# Patient Record
Sex: FEMALE | Race: WHITE | Employment: FULL TIME | URBAN - METROPOLITAN AREA
[De-identification: names, ages, dates, MRNs, and addresses within clinical notes are randomized per-mention and may not be internally consistent; named-entity substitution may affect disease eponyms.]

---

## 2017-07-04 PROBLEM — D25.9 UTERINE FIBROIDS AFFECTING PREGNANCY IN FIRST TRIMESTER: Status: ACTIVE | Noted: 2017-07-04

## 2017-07-04 PROBLEM — O34.11 UTERINE FIBROIDS AFFECTING PREGNANCY IN FIRST TRIMESTER: Status: ACTIVE | Noted: 2017-07-04

## 2017-08-09 PROBLEM — O99.212 OBESITY AFFECTING PREGNANCY IN SECOND TRIMESTER: Status: ACTIVE | Noted: 2017-08-09

## 2017-09-18 PROBLEM — O34.29 PREGNANCY W/ HX OF UTERINE MYOMECTOMY: Status: ACTIVE | Noted: 2017-09-18

## 2017-09-19 PROBLEM — O34.12 UTERINE FIBROIDS AFFECTING PREGNANCY IN SECOND TRIMESTER: Status: ACTIVE | Noted: 2017-07-04

## 2017-09-19 PROBLEM — O24.414 INSULIN CONTROLLED GESTATIONAL DIABETES MELLITUS (GDM) IN SECOND TRIMESTER: Status: ACTIVE | Noted: 2017-09-19

## 2017-09-19 PROBLEM — O99.332 TOBACCO USE AFFECTING PREGNANCY IN SECOND TRIMESTER, ANTEPARTUM: Status: ACTIVE | Noted: 2017-09-19

## 2017-09-19 PROBLEM — O09.92 SUPERVISION OF HIGH RISK PREGNANCY IN SECOND TRIMESTER: Status: ACTIVE | Noted: 2017-09-19

## 2017-09-19 PROBLEM — O24.312 PREEXISTING DIABETES COMPLICATING PREGNANCY IN SECOND TRIMESTER, ANTEPARTUM: Status: ACTIVE | Noted: 2017-09-19

## 2017-10-02 ENCOUNTER — HOSPITAL ENCOUNTER (OUTPATIENT)
Dept: DIABETES SERVICES | Age: 32
Discharge: HOME | End: 2017-10-02
Payer: COMMERCIAL

## 2017-10-02 VITALS — HEIGHT: 63 IN | WEIGHT: 255 LBS | BODY MASS INDEX: 45.18 KG/M2

## 2017-10-02 PROCEDURE — G0109 DIAB MANAGE TRN IND/GROUP: HCPCS

## 2017-10-02 NOTE — PROGRESS NOTES
Gestational Diabetes Self-Management Support Plan    Services Provided: Pt received education on nutrition and meal planning during pregnancy. Emotional support for adjustment to diagnosis was provided. Care Plan/Recommendations:  Pt instructed to record blood sugar 4x/day and record all meals and snacks. Pt to bring information to appointments with [facility]. Notes for Follow Up:   1. Barriers to checking blood glucose and adherence to meal plan identified:    Pt works 3rd shift and struggles with consistent meals/snacks. 2. Barriers to psychological adjustment to diagnosis identified:    Acceptance, pt also has PCOS  3.  Patient needs to be seen by [facility] ASAP due to:    Next appointment 10/10/17    Katie Hurtado MS, RD, LD  [facility]   [phone]

## 2017-11-21 PROBLEM — O99.213 OBESITY AFFECTING PREGNANCY IN THIRD TRIMESTER: Status: ACTIVE | Noted: 2017-08-09

## 2017-11-21 PROBLEM — O09.93 SUPERVISION OF HIGH RISK PREGNANCY IN THIRD TRIMESTER: Status: ACTIVE | Noted: 2017-09-19

## 2017-11-21 PROBLEM — O99.333 TOBACCO SMOKING COMPLICATING PREGNANCY, THIRD TRIMESTER: Status: ACTIVE | Noted: 2017-09-19

## 2017-11-21 PROBLEM — O34.13 UTERINE FIBROIDS AFFECTING PREGNANCY, THIRD TRIMESTER: Status: ACTIVE | Noted: 2017-07-04

## 2017-11-21 PROBLEM — O24.113 PRE-EXISTING TYPE 2 DIABETES MELLITUS IN PREGNANCY IN THIRD TRIMESTER: Status: ACTIVE | Noted: 2017-09-19

## 2017-12-22 ENCOUNTER — HOSPITAL ENCOUNTER (INPATIENT)
Age: 32
LOS: 8 days | Discharge: HOME | End: 2017-12-30
Attending: OBSTETRICS & GYNECOLOGY | Admitting: OBSTETRICS & GYNECOLOGY
Payer: COMMERCIAL

## 2017-12-22 DIAGNOSIS — O24.113 PRE-EXISTING TYPE 2 DIABETES MELLITUS IN PREGNANCY IN THIRD TRIMESTER: ICD-10-CM

## 2017-12-22 PROBLEM — O13.9 PREGNANCY INDUCED HYPERTENSION, ANTEPARTUM: Status: ACTIVE | Noted: 2017-12-22

## 2017-12-22 PROBLEM — O14.00 MILD PRE-ECLAMPSIA: Status: ACTIVE | Noted: 2017-12-22

## 2017-12-22 PROBLEM — O14.00 MILD PRE-ECLAMPSIA: Status: RESOLVED | Noted: 2017-12-22 | Resolved: 2017-12-22

## 2017-12-22 LAB
ALBUMIN SERPL-MCNC: 2.6 G/DL (ref 3.5–5)
ALBUMIN/GLOB SERPL: 0.7 {RATIO} (ref 1.2–3.5)
ALP SERPL-CCNC: 172 U/L (ref 50–136)
ALT SERPL-CCNC: 17 U/L (ref 12–65)
ANION GAP SERPL CALC-SCNC: 11 MMOL/L (ref 7–16)
AST SERPL-CCNC: 14 U/L (ref 15–37)
BILIRUB DIRECT SERPL-MCNC: 0.1 MG/DL
BILIRUB SERPL-MCNC: 0.2 MG/DL (ref 0.2–1.1)
BUN SERPL-MCNC: 10 MG/DL (ref 6–23)
CALCIUM SERPL-MCNC: 9.4 MG/DL (ref 8.3–10.4)
CHLORIDE SERPL-SCNC: 102 MMOL/L (ref 98–107)
CO2 SERPL-SCNC: 26 MMOL/L (ref 21–32)
CREAT SERPL-MCNC: 0.53 MG/DL (ref 0.6–1)
CREAT UR-MCNC: 104.88 MG/DL
ERYTHROCYTE [DISTWIDTH] IN BLOOD BY AUTOMATED COUNT: 12.8 % (ref 11.9–14.6)
GLOBULIN SER CALC-MCNC: 3.7 G/DL (ref 2.3–3.5)
GLUCOSE BLD STRIP.AUTO-MCNC: 133 MG/DL (ref 65–100)
GLUCOSE SERPL-MCNC: 88 MG/DL (ref 65–100)
HCT VFR BLD AUTO: 36.8 % (ref 35.8–46.3)
HGB BLD-MCNC: 12.6 G/DL (ref 11.7–15.4)
LDH SERPL L TO P-CCNC: 152 U/L (ref 100–190)
MCH RBC QN AUTO: 28.8 PG (ref 26.1–32.9)
MCHC RBC AUTO-ENTMCNC: 34.2 G/DL (ref 31.4–35)
MCV RBC AUTO: 84.2 FL (ref 79.6–97.8)
PLATELET # BLD AUTO: 269 K/UL (ref 150–450)
PMV BLD AUTO: 9.9 FL (ref 10.8–14.1)
POTASSIUM SERPL-SCNC: 4 MMOL/L (ref 3.5–5.1)
PROT SERPL-MCNC: 6.3 G/DL (ref 6.3–8.2)
PROT UR-MCNC: 28 MG/DL
PROT/CREAT UR-RTO: 0.3
RBC # BLD AUTO: 4.37 M/UL (ref 4.05–5.25)
SODIUM SERPL-SCNC: 139 MMOL/L (ref 136–145)
URATE SERPL-MCNC: 4.5 MG/DL (ref 2.6–6)
WBC # BLD AUTO: 11.5 K/UL (ref 4.3–11.1)

## 2017-12-22 PROCEDURE — 80076 HEPATIC FUNCTION PANEL: CPT | Performed by: OBSTETRICS & GYNECOLOGY

## 2017-12-22 PROCEDURE — 84156 ASSAY OF PROTEIN URINE: CPT | Performed by: OBSTETRICS & GYNECOLOGY

## 2017-12-22 PROCEDURE — 85027 COMPLETE CBC AUTOMATED: CPT | Performed by: OBSTETRICS & GYNECOLOGY

## 2017-12-22 PROCEDURE — 96372 THER/PROPH/DIAG INJ SC/IM: CPT

## 2017-12-22 PROCEDURE — 74011636637 HC RX REV CODE- 636/637: Performed by: OBSTETRICS & GYNECOLOGY

## 2017-12-22 PROCEDURE — 80048 BASIC METABOLIC PNL TOTAL CA: CPT | Performed by: OBSTETRICS & GYNECOLOGY

## 2017-12-22 PROCEDURE — 74011250636 HC RX REV CODE- 250/636: Performed by: OBSTETRICS & GYNECOLOGY

## 2017-12-22 PROCEDURE — 4A1HXCZ MONITORING OF PRODUCTS OF CONCEPTION, CARDIAC RATE, EXTERNAL APPROACH: ICD-10-PCS | Performed by: OBSTETRICS & GYNECOLOGY

## 2017-12-22 PROCEDURE — 74011250637 HC RX REV CODE- 250/637: Performed by: OBSTETRICS & GYNECOLOGY

## 2017-12-22 PROCEDURE — 83615 LACTATE (LD) (LDH) ENZYME: CPT | Performed by: OBSTETRICS & GYNECOLOGY

## 2017-12-22 PROCEDURE — 84550 ASSAY OF BLOOD/URIC ACID: CPT | Performed by: OBSTETRICS & GYNECOLOGY

## 2017-12-22 PROCEDURE — 65270000029 HC RM PRIVATE

## 2017-12-22 PROCEDURE — 82962 GLUCOSE BLOOD TEST: CPT

## 2017-12-22 PROCEDURE — 36415 COLL VENOUS BLD VENIPUNCTURE: CPT | Performed by: OBSTETRICS & GYNECOLOGY

## 2017-12-22 RX ORDER — BETAMETHASONE SODIUM PHOSPHATE AND BETAMETHASONE ACETATE 3; 3 MG/ML; MG/ML
12 INJECTION, SUSPENSION INTRA-ARTICULAR; INTRALESIONAL; INTRAMUSCULAR; SOFT TISSUE EVERY 24 HOURS
Status: DISCONTINUED | OUTPATIENT
Start: 2017-12-22 | End: 2017-12-22 | Stop reason: SDUPTHER

## 2017-12-22 RX ORDER — BETAMETHASONE SODIUM PHOSPHATE AND BETAMETHASONE ACETATE 3; 3 MG/ML; MG/ML
12 INJECTION, SUSPENSION INTRA-ARTICULAR; INTRALESIONAL; INTRAMUSCULAR; SOFT TISSUE EVERY 24 HOURS
Status: COMPLETED | OUTPATIENT
Start: 2017-12-22 | End: 2017-12-23

## 2017-12-22 RX ORDER — ZOLPIDEM TARTRATE 5 MG/1
5 TABLET ORAL
Status: DISCONTINUED | OUTPATIENT
Start: 2017-12-22 | End: 2017-12-28

## 2017-12-22 RX ORDER — INSULIN LISPRO 100 [IU]/ML
INJECTION, SOLUTION INTRAVENOUS; SUBCUTANEOUS EVERY 4 HOURS
Status: DISCONTINUED | OUTPATIENT
Start: 2017-12-22 | End: 2017-12-22

## 2017-12-22 RX ORDER — INSULIN LISPRO 100 [IU]/ML
INJECTION, SOLUTION INTRAVENOUS; SUBCUTANEOUS AS NEEDED
Status: DISCONTINUED | OUTPATIENT
Start: 2017-12-22 | End: 2017-12-28

## 2017-12-22 RX ORDER — INSULIN GLARGINE 100 [IU]/ML
16 INJECTION, SOLUTION SUBCUTANEOUS
Status: DISCONTINUED | OUTPATIENT
Start: 2017-12-22 | End: 2017-12-28

## 2017-12-22 RX ORDER — INSULIN LISPRO 100 [IU]/ML
26 INJECTION, SOLUTION INTRAVENOUS; SUBCUTANEOUS
Status: DISCONTINUED | OUTPATIENT
Start: 2017-12-22 | End: 2017-12-24

## 2017-12-22 RX ADMIN — ZOLPIDEM TARTRATE 5 MG: 5 TABLET ORAL at 22:17

## 2017-12-22 RX ADMIN — INSULIN GLARGINE 16 UNITS: 100 INJECTION, SOLUTION SUBCUTANEOUS at 20:30

## 2017-12-22 RX ADMIN — BETAMETHASONE SODIUM PHOSPHATE AND BETAMETHASONE ACETATE 12 MG: 3; 3 INJECTION, SUSPENSION INTRA-ARTICULAR; INTRALESIONAL; INTRAMUSCULAR at 13:37

## 2017-12-22 NOTE — PROGRESS NOTES
Dr Kat Barbosa called. Report given of VS and labs resulted. Orders received to admit for Mild Pre Eclampsia. Celestone ordered. May induce at 37 weeks unless Bp's rise further.

## 2017-12-22 NOTE — H&P
High Risk Obstetrics Admission H&P    Surendra Christensen  [id number]  1985    Chief Complaint:  New Mild PreE      HPI: 29 y.o. Lucian Francis at 36w3d sent from the office for HELLP labs and P:C ratio after having her 2nd mild range bp (148/90) this pregnancy. No hx CHTN.  1st mild bp noted at 35w3d-->high normotensive on repeat (132/88). Denies HA, SOB/CP, RUQ pain, scotomata. Gained 3# in 4 days. HELLP labs wnl, however P:C elevated at 0.3 and rules her in for Pre Eclampsia withOUT severe features. Has seen MFM regularly due to preexisting Type 2 DM. Last growth US (17) GP 22%, AC 17%, ED 18, Dopplers wnl. ROS:  A comprehensive review of systems was negative except for that written in the HPI. OB History      Para Term  AB Living    2 0 0 0 1 0    SAB TAB Ectopic Molar Multiple Live Births    1 0 0  0         Past Medical History:   Diagnosis Date    Asthma     prn inhaler    Cholelithiasis with cholecystitis 2015    GERD (gastroesophageal reflux disease)     controlled with medication    History of miscarriage 2014    Morbid obesity ([employer] [facility] [address]     bmi =51    Nausea & vomiting     with every surg--3-4 times-- then ok per pt    PCOS (polycystic ovarian syndrome)     Pregnancy induced hypertension, antepartum 2017    Psychiatric disorder     anxiety-- per pt-- no tx    PUD (peptic ulcer disease)     no problems since    Sleep apnea     ?--with  [facility]--- per pt never has been tested     Past Surgical History:   Procedure Laterality Date    HX CHOLECYSTECTOMY  2015    Dr Georgia Leyva      HX TONSIL AND ADENOIDECTOMY      HX WISDOM TEETH EXTRACTION       Social History     Social History    Marital status:      Spouse name: N/A    Number of children: N/A    Years of education: N/A     Occupational History    Not on file.      Social History Main Topics    Smoking status: Light Tobacco Smoker Packs/day: 1.00     Years: 9.00    Smokeless tobacco: Never Used      Comment: 1 cigarette daily    Alcohol use No    Drug use: No      Comment: last time used- 2015    Sexual activity: Yes     Partners: Male     Birth control/ protection: None      Comment: pregnant     Other Topics Concern    Not on file     Social History Narrative     Family History   Problem Relation Age of Onset    Hypertension Mother     Migraines Mother     Cancer Mother      cervical    Elevated Lipids Father     Breast Cancer Neg Hx     Colon Cancer Neg Hx     Ovarian Cancer Neg Hx      Allergies   Allergen Reactions    Latex Rash and Itching    Tree Nut Anaphylaxis    Codeine Nausea and Vomiting    Sulfa (Sulfonamide Antibiotics) Nausea and Vomiting    Naproxen Other (comments)     Can not take due to history peptic ulcers     Prior to Admission Medications   Prescriptions Last Dose Informant Patient Reported? Taking? Blood-Glucose Meter monitoring kit   No No   Sig: Pt to check blood sugars 4x daily. Cetirizine (ZYRTEC) 10 mg cap   Yes No   Sig: Take  by mouth. Insulin Needles, Disposable, (UNIFINE PENTIPS) 32 gauge x 5/32\" ndle   No No   Si Pen Needle by Does Not Apply route five (5) times daily. Insulin Needles, Disposable, (UNIFINE PENTIPS) 32 gauge x 5/32\" ndle   No No   Si Pen Needle by Does Not Apply route five (5) times daily. Lancets misc   No No   Sig: Pt to check blood sugars 4 x daily   PNV no.24-iron-folic acid-dha (PRENATAL DHA+COMPLETE PRENATAL) -300 mg-mcg-mg cmpk   Yes No   Sig: Take  by mouth. acetaminophen (TYLENOL EXTRA STRENGTH) 500 mg tablet   Yes No   Sig: Take  by mouth every six (6) hours as needed for Pain. aspirin 81 mg chewable tablet   Yes No   Sig: Take 81 mg by mouth daily. calcium carbonate (TUMS) 200 mg calcium (500 mg) chew   Yes No   Sig: Take 1 Tab by mouth daily.    cholecalciferol, vitamin D3, (VITAMIN D3) 2,000 unit tab   Yes No   Sig: Take  by mouth daily. docusate sodium (COLACE) 100 mg capsule   Yes No   Sig: Take 100 mg by mouth two (2) times a day. glucose blood VI test strips (ASCENSIA AUTODISC VI, ONE TOUCH ULTRA TEST VI) strip   No No   Sig: Pt to check blood sugars 4x daily   insulin detemir (LEVEMIR FLEXTOUCH) 100 unit/mL (3 mL) inpn   No No   Sig: 10-30 units as directed twice daily; May substitute Lantus   insulin lispro (HUMALOG) 100 unit/mL kwikpen   No No   Si - 30 units subcutaneously with meals as directed   loratadine (CLARITIN) 10 mg tablet   Yes No   Sig: Take 10 mg by mouth. ranitidine (ZANTAC) 150 mg tablet   Yes No   Sig: Take 150 mg by mouth as needed. Facility-Administered Medications: None         Vitals:    Patient Vitals for the past 8 hrs:   BP Temp Pulse Resp   17 1251 142/69 - 79 -   17 1240 147/76 - 80 -   17 1230 161/83 - 74 -   17 1220 152/79 - 77 -   17 1209 148/70 - 75 -   17 1208 - 98.7 °F (37.1 °C) - 20     Temp (24hrs), Av.7 °F (37.1 °C), Min:98.7 °F (37.1 °C), Max:98.7 °F (37.1 °C)             Physical Exam:  Constitutional: She appears well-developed and well-nourished. No distress. HENT:    Head: Normocephalic and atraumatic. Lungs: CTAB, effort normal, no rales/crackles  Cardiovascular: RRR, no M/R/G  Abd:  Gravid, no RUQ TTP  Skin: She is not diaphoretic. Psychiatric: She has a normal mood and affect.  Her behavior is normal. Thought content normal.   Exts:  DTRs 3+, no c/c; no clonus, 1+ edema    SVE: deferred              Labs:   Recent Results (from the past 24 hour(s))   CBC W/O DIFF    Collection Time: 17 12:13 PM   Result Value Ref Range    WBC 11.5 (H) 4.3 - 11.1 K/uL    RBC 4.37 4.05 - 5.25 M/uL    HGB 12.6 11.7 - 15.4 g/dL    HCT 36.8 35.8 - 46.3 %    MCV 84.2 79.6 - 97.8 FL    MCH 28.8 26.1 - 32.9 PG    MCHC 34.2 31.4 - 35.0 g/dL    RDW 12.8 11.9 - 14.6 %    PLATELET 343 238 - 582 K/uL    MPV 9.9 (L) 10.8 - 14.1 FL   HEPATIC FUNCTION PANEL Collection Time: 17 12:13 PM   Result Value Ref Range    Protein, total 6.3 6.3 - 8.2 g/dL    Albumin 2.6 (L) 3.5 - 5.0 g/dL    Globulin 3.7 (H) 2.3 - 3.5 g/dL    A-G Ratio 0.7 (L) 1.2 - 3.5      Bilirubin, total 0.2 0.2 - 1.1 MG/DL    Bilirubin, direct 0.1 <0.4 MG/DL    Alk. phosphatase 172 (H) 50 - 136 U/L    AST (SGOT) 14 (L) 15 - 37 U/L    ALT (SGPT) 17 12 - 65 U/L   LD    Collection Time: 17 12:13 PM   Result Value Ref Range     100 - 190 U/L   URIC ACID    Collection Time: 17 12:13 PM   Result Value Ref Range    Uric acid 4.5 2.6 - 6.0 MG/DL   METABOLIC PANEL, BASIC    Collection Time: 17 12:13 PM   Result Value Ref Range    Sodium 139 136 - 145 mmol/L    Potassium 4.0 3.5 - 5.1 mmol/L    Chloride 102 98 - 107 mmol/L    CO2 26 21 - 32 mmol/L    Anion gap 11 7 - 16 mmol/L    Glucose 88 65 - 100 mg/dL    BUN 10 6 - 23 MG/DL    Creatinine 0.53 (L) 0.6 - 1.0 MG/DL    GFR est AA >60 >60 ml/min/1.73m2    GFR est non-AA >60 >60 ml/min/1.73m2    Calcium 9.4 8.3 - 10.4 MG/DL   PROTEIN/CREATININE RATIO, URINE    Collection Time: 17 12:14 PM   Result Value Ref Range    Protein, urine random 28 (H) <11.9 mg/dL    Creatinine, urine 104.88 mg/dL    Protein/Creat. urine Ratio 0.3         Assessment and Plan:     29 y.o.  at 36w3d with new dx of PreEclampsia w/OUT severe features:    1) Mild PreE:   -serial bps   -goal 37wks, deliver if becomes severe    -BMZ x2 (#1 at 1337 today)     2) T2DM:   -16/16 Lantus and humalog increased to 26/26/26 w/ meals along w/ SSI per MFM    -expected elevations due to BMZ          Discussed plan and dx in depth with pt and FOB. This note will not be viewable in [address]       Gladys Epps MD

## 2017-12-22 NOTE — PROGRESS NOTES
Dr Hong Dominguez was here this afternoon and discussed POC with Dr Hong Dominguez. No note in chart from Hong Dominguez at this time. Per Dr Emily Curiel POC is:    Assessment and Plan:     29 y.o. Ryan Browning at 36w3d with new dx of PreEclampsia w/OUT severe features:     1) Mild PreE:                        -serial bps                        -goal 37wks, deliver if becomes severe                         -BMZ x2 (#1 at 1337 today)      2) T2DM:                        -16/16 Lantus and humalog increased to16/16/16 w/ meals along w/ SSI per MFM                         -expected elevations due to BMZ        No Lantus insulin ordered. Will notify Dr Emily Curiel and clarify insulin orders. Pt at lunch just after admission today and has not eaten supper yet. States she is waiting to eat with her . Also, no orders for BS's noted.     13:29:07 Orders Placed insulin lispro (HUMALOG) injection ; IP CONSULT TO PERINATOLOGY Oseas French MD   13:37 Medication Given betamethasone (CELESTONE) injection 12 mg -  Dose: 12 mg ; Route: IntraMUSCular ; Site: Right Gluteus [place] ; Scheduled Time: 75 Carter Pierce RN   13:37:43 Orders Placed insulin lispro (HUMALOG) injection 26 Units Amira Nuñez MD   13:37:44 Orders Discontinued insulin lispro (HUMALOG) injection Amira Nuñez MD   13:37:44 Orders Placed insulin lispro (HUMALOG) injection Amira Nuñez MD

## 2017-12-22 NOTE — PROGRESS NOTES
Labs resulted:      Protein/Creat. urine Ratio    PROTEIN/CREATININE RATIO, URINE           Collected: 12/22/17 1214     Resulting lab: [id number] [address] LAB     Reference range:       Value: 0.3     Comment:      Results for Rebeca Redmond (MRN [id number]) as of 12/22/2017 12:47   Ref. Range 12/22/2017 12:13   ALT (SGPT) Latest Ref Range: 12 - 65 U/L 17   AST Latest Ref Range: 15 - 37 U/L 14 (L)   Alk. phosphatase Latest Ref Range: 50 - 136 U/L 172 (H)   LD Latest Ref Range: 100 - 190 U/L 152   Uric acid Latest Ref Range: 2.6 - 6.0 MG/DL 4.5     Results for Rebeca Redmond (MRN [id number]) as of 12/22/2017 12:47   Ref.  Range 12/22/2017 12:13   PLATELET Latest Ref Range: 150 - 450 K/uL 269

## 2017-12-22 NOTE — IP AVS SNAPSHOT
[address] [facility] [address] [facility] 
[phone] Patient: Jocelyn Acuna MRN: [id number] VEQ:[id number] About your hospitalization You were admitted on:  December 22, 2017 You last received care in the:  [address] [facility] You were discharged on:  December 30, 2017 Why you were hospitalized Your primary diagnosis was:  Severe Pre-Eclampsia Your diagnoses also included:  Mild Pre-Eclampsia, Pre-Existing Type 2 Diabetes Mellitus In Pregnancy In Third Trimester, Obesity Affecting Pregnancy In Third Trimester, Pregnancy W/ Hx Of Uterine Myomectomy, Supervision Of High Risk Pregnancy In Third Trimester, Polycystic Ovary Complicating Pregnancy, Antepartum, Tobacco Smoking Complicating Pregnancy, Third Trimester, Uterine Fibroids Affecting Pregnancy, Third Trimester Things You Need To Do (next 8 weeks) Schedule an appointment with Skye Cyr MD as soon as possible for a visit in 1 week(s) For blood pressure check. Phone:  [phone] Where:  [address] [place] [id number] Discharge Orders None A check ulises indicates which time of day the medication should be taken. My Medications TAKE these medications as instructed Instructions Each Dose to Equal  
 Morning Noon Evening Bedtime CLARITIN 10 mg tablet Generic drug:  loratadine Your last dose was: Your next dose is: Take 10 mg by mouth. 10 mg  
    
   
   
   
  
 NIFEdipine ER 30 mg ER tablet Commonly known as:  PROCARDIA XL Your last dose was: Your next dose is: Take 1 Tab by mouth two (2) times a day. 30 mg  
    
   
   
   
  
 oxyCODONE-acetaminophen 5-325 mg per tablet Commonly known as:  PERCOCET Your last dose was: Your next dose is: Take 1 Tab by mouth every six (6) hours as needed. Max Daily Amount: 4 Tabs. 1 Tab PRENATAL DHA+COMPLETE PRENATAL -300 mg-mcg-mg Cmpk Generic drug:  [id number] doreen-folic-dha Your last dose was: Your next dose is: Take  by mouth. ZANTAC 150 mg tablet Generic drug:  raNITIdine Your last dose was: Your next dose is: Take 150 mg by mouth as needed. 150 mg ASK your physician about these medications Instructions Each Dose to Equal  
 Morning Noon Evening Bedtime  
 aspirin 81 mg chewable tablet Your last dose was: Your next dose is: Take 81 mg by mouth daily. 81 mg Blood-Glucose Meter monitoring kit Your last dose was: Your next dose is:    
   
   
 Pt to check blood sugars 4x daily. calcium carbonate 200 mg calcium (500 mg) Chew Commonly known as:  TUMS Your last dose was: Your next dose is: Take 1 Tab by mouth daily. 1 Tab COLACE 100 mg capsule Generic drug:  docusate sodium Your last dose was: Your next dose is: Take 100 mg by mouth two (2) times a day. 100 mg  
    
   
   
   
  
 glucose blood VI test strips strip Commonly known as:  ASCENSIA AUTODISC VI, ONE TOUCH ULTRA TEST VI Your last dose was: Your next dose is:    
   
   
 Pt to check blood sugars 4x daily  
     
   
   
   
  
 insulin detemir 100 unit/mL (3 mL) Inpn Commonly known as:  Glen Duran Your last dose was: Your next dose is:    
   
   
 10-30 units as directed twice daily; May substitute Lantus  
     
   
   
   
  
 insulin lispro 100 unit/mL kwikpen Commonly known as:  HUMALOG Your last dose was: Your next dose is:    
   
   
 20 - 30 units subcutaneously with meals as directed * Insulin Needles (Disposable) 32 gauge x 5/32\" Ndle Commonly known as:  Meet Allen Your last dose was: Your next dose is:    
   
   
 1 Pen Needle by Does Not Apply route five (5) times daily. 1 Pen Needle * Insulin Needles (Disposable) 32 gauge x 5/32\" Ndle Commonly known as:  Villalba Allen Your last dose was: Your next dose is:    
   
   
 1 Pen Needle by Does Not Apply route five (5) times daily. 1 Pen Needle Lancets Misc Your last dose was: Your next dose is:    
   
   
 Pt to check blood sugars 4 x daily TYLENOL EXTRA STRENGTH 500 mg tablet Generic drug:  acetaminophen Your last dose was: Your next dose is: Take  by mouth every six (6) hours as needed for Pain. VITAMIN D3 2,000 unit Tab Generic drug:  cholecalciferol (vitamin D3) Your last dose was: Your next dose is: Take  by mouth daily. ZyrTEC 10 mg Cap Generic drug:  Cetirizine Your last dose was: Your next dose is: Take  by mouth. * Notice: This list has 2 medication(s) that are the same as other medications prescribed for you. Read the directions carefully, and ask your doctor or other care provider to review them with you. Where to Get Your Medications These medications were sent to [address] [place] [address] [address] [address] Phone:  [phone] NIFEdipine ER 30 mg ER tablet Information on where to get these meds will be given to you by the nurse or doctor. ! Ask your nurse or doctor about these medications  
  oxyCODONE-acetaminophen 5-325 mg per tablet Discharge Instructions Discharge instruction to follow: Activity: Pelvis rest for 6 weeks No heavy lifting over 15 lbs for 2 weeks No driving for 2 weeks No push/pull motion such as sweeping or vacuuming for 2 weeks No tub baths for 6 weeks Continue to use bobby-bottle with every void or bowel movement until comfortable stopping. Change sanitary pad after each urination or bowel movement. Call MD for the following: 
    Fever over 101 F; pain not relieved by medication; foul smelling vaginal discharge or increase in vaginal bleeding. Redness, swelling, or drainage from  incision. Take medication as prescribed. Follow up with MD as order. Your  at Home: Care Instructions Your Care Instructions During your baby's first few weeks, you will spend most of your time feeding, diapering, and comforting your baby. You may feel overwhelmed at times. It is normal to wonder if you know what you are doing, especially if you are first-time parents.  care gets easier with every day. Soon you will know what each cry means and be able to figure out what your baby needs and wants. Follow-up care is a key part of your child's treatment and safety. Be sure to make and go to all appointments, and call your doctor if your child is having problems. It's also a good idea to know your child's test results and keep a list of the medicines your child takes. How can you care for your child at home? Feeding · Feed your baby on demand. This means that you should breastfeed or bottle-feed your baby whenever he or she seems hungry. Do not set a schedule. · During the first 2 weeks,  babies need to be fed every 1 to 3 hours (10 to 12 times in 24 hours) or whenever the baby is hungry. Formula-fed babies may need fewer feedings, about 6 to 10 every 24 hours. · These early feedings often are short. Sometimes, a  nurses or drinks from a bottle only for a few minutes. Feedings gradually will last longer. · You may have to wake your sleepy baby to feed in the first few days after birth. Sleeping · Always put your baby to sleep on his or her back, not the stomach. This lowers the risk of sudden infant death syndrome (SIDS). · Most babies sleep for a total of 18 hours each day. They wake for a short time at least every 2 to 3 hours. · Newborns have some moments of active sleep. The baby may make sounds or seem restless. This happens about every 50 to 60 minutes and usually lasts a few minutes. · At first, your baby may sleep through loud noises. Later, noises may wake your baby. · When your  wakes up, he or she usually will be hungry and will need to be fed. Diaper changing and bowel habits · Try to check your baby's diaper at least every 2 hours. If it needs to be changed, do it as soon as you can. That will help prevent diaper rash. · Your 's wet and soiled diapers can give you clues about your baby's health. Babies can become dehydrated if they're not getting enough breast milk or formula or if they lose fluid because of diarrhea, vomiting, or a fever. · For the first few days, your baby may have about 3 wet diapers a day. After that, expect 6 or more wet diapers a day throughout the first month of life. It can be hard to tell when a diaper is wet if you use disposable diapers. If you cannot tell, put a piece of tissue in the diaper. It will be wet when your baby urinates. · Keep track of what bowel habits are normal or usual for your child. Umbilical cord care · Gently clean your baby's umbilical cord stump and the skin around it at least one time a day. You also can clean it during diaper changes. · Gently pat dry the area with a soft cloth. You can help your baby's umbilical cord stump fall off and heal faster by keeping it dry between cleanings. · The stump should fall off within a week or two.  After the stump falls off, keep cleaning around the belly button at least one time a day until it has healed. When should you call for help? Call your baby's doctor now or seek immediate medical care if: 
? · Your baby has a rectal temperature that is less than 97.8°F or is 100.4°F or higher. Call if you cannot take your baby's temperature but he or she seems hot. ? · Your baby has no wet diapers for 6 hours. ? · Your baby's skin or whites of the eyes gets a brighter or deeper yellow. ? · You see pus or red skin on or around the umbilical cord stump. These are signs of infection. ? Watch closely for changes in your child's health, and be sure to contact your doctor if: 
? · Your baby is not having regular bowel movements based on his or her age. ? · Your baby cries in an unusual way or for an unusual length of time. ? · Your baby is rarely awake and does not wake up for feedings, is very fussy, seems too tired to eat, or is not interested in eating. Where can you learn more? Go to http://miquel-surinder.info/. Enter X949 in the search box to learn more about \"Your Atlas at Home: Care Instructions. \" Current as of: May 12, 2017 Content Version: 11.4 © 6735-9720 [employer] Care instructions adapted under license by [employer] (which disclaims liability or warranty for this information). If you have questions about a medical condition or this instruction, always ask your healthcare professional. [place] [address] any warranty or liability for your use of this information. After Your Delivery (the Postpartum Period): Care Instructions Your Care Instructions Congratulations on the birth of your baby. Like pregnancy, the  period can be a time of excitement, vero, and exhaustion. You may look at your wondrous little baby and feel happy. You may also be overwhelmed by your new sleep hours and new responsibilities. At first, babies often sleep during the days and are awake at night. They do not have a pattern or routine. They may make sudden gasps, jerk themselves awake, or look like they have crossed eyes. These are all normal, and they may even make you smile. In these first weeks after delivery, try to take good care of yourself. It may take 4 to 6 weeks to feel like yourself again, and possibly longer if you had a  birth. You will likely feel very tired for several weeks. Your days will be full of ups and downs, but lots of vero as well. Follow-up care is a key part of your treatment and safety. Be sure to make and go to all appointments, and call your doctor if you are having problems. It's also a good idea to know your test results and keep a list of the medicines you take. How can you care for yourself at home? Take care of your body after delivery · Use pads instead of tampons for the bloody flow that may last as long as 2 weeks. · Ease cramps with ibuprofen (Advil, Motrin). · Ease soreness of hemorrhoids and the area between your vagina and rectum with ice compresses or witch hazel pads. · Ease constipation by drinking lots of fluid and eating high-fiber foods. Ask your doctor about over-the-counter stool softeners. · Cleanse yourself with a gentle squeeze of warm water from a bottle instead of wiping with toilet paper. · Take a sitz bath in warm water several times a day. · Wear a good nursing bra. Ease sore and swollen breasts with warm, wet washcloths. · If you are not breastfeeding, use ice rather than heat for breast soreness. · Your period may not start for several months if you are breastfeeding. You may bleed more, and longer at first, than you did before you got pregnant. · Wait until you are healed (about 4 to 6 weeks) before you have sexual intercourse. Your doctor will tell you when it is okay to have sex. · Try not to travel with your baby for 5 or 6 weeks.  If you take a long car trip, make frequent stops to walk around and stretch. Avoid exhaustion · Rest every day. Try to nap when your baby naps. · Ask another adult to be with you for a few days after delivery. · Plan for  if you have other children. · Stay flexible so you can eat at odd hours and sleep when you need to. Both you and your baby are making new schedules. · Plan small trips to get out of the house. Change can make you feel less tired. · Ask for help with housework, cooking, and shopping. Remind yourself that your job is to care for your baby. Know about help for postpartum depression · \"Baby blues\" are common for the first 1 to 2 weeks after birth. You may cry or feel sad or irritable for no reason. · Rest whenever you can. Being tired makes it harder to handle your emotions. · Go for walks with your baby. · Talk to your partner, friends, and family about your feelings. · If your symptoms last for more than a few weeks, or if you feel very depressed, ask your doctor for help. · Postpartum depression can be treated. Support groups and counseling can help. Sometimes medicine can also help. Stay healthy · Eat healthy foods so you have more energy, make good breast milk, and lose extra baby pounds. · If you breastfeed, avoid alcohol and drugs. Stay smoke-free. If you quit during pregnancy, congratulations. · Start daily exercise after 4 to 6 weeks, but rest when you feel tired. · Learn exercises to tone your belly. Do Kegel exercises to regain strength in your pelvic muscles. You can do these exercises while you stand or sit. ¨ Squeeze the same muscles you would use to stop your urine. Your belly and thighs should not move. ¨ Hold the squeeze for 3 seconds, and then relax for 3 seconds. ¨ Start with 3 seconds. Then add 1 second each week until you are able to squeeze for 10 seconds. ¨ Repeat the exercise 10 to 15 times for each session. Do three or more sessions each day. · Find a class for new mothers and new babies that has an exercise time. · If you had a  birth, give yourself a bit more time before you exercise, and be careful. When should you call for help? Call 911 anytime you think you may need emergency care. For example, call if: 
? · You passed out (lost consciousness). ?Call your doctor now or seek immediate medical care if: 
? · You have severe vaginal bleeding. This means you are passing blood clots and soaking through a pad each hour for 2 or more hours. ? · You are dizzy or lightheaded, or you feel like you may faint. ? · You have a fever. ? · You have new belly pain, or your pain gets worse. ? Watch closely for changes in your health, and be sure to contact your doctor if: 
? · Your vaginal bleeding seems to be getting heavier. ? · You have new or worse vaginal discharge. ? · You feel sad, anxious, or hopeless for more than a few days. ? · You do not get better as expected. Where can you learn more? Go to http://miquel-surinder.info/. Enter A461 in the search box to learn more about \"After Your Delivery (the Postpartum Period): Care Instructions. \" Current as of: 2017 Content Version: 11.4 © 9997-4350 [employer]. Care instructions adapted under license by [employer] (which disclaims liability or warranty for this information). If you have questions about a medical condition or this instruction, always ask your healthcare professional. [place] [address] any warranty or liability for your use of this information. Lotame Announcement We are excited to announce that we are making your provider's discharge notes available to you in Lotame. You will see these notes when they are completed and signed by the physician that discharged you from your recent hospital stay.   If you have any questions or concerns about any information you see in MapMyIndia, please call the Health Information Department where you were seen or reach out to your Primary Care Provider for more information about your plan of care. Introducing [facility]! Dear Kirk Aguirre: 
Thank you for requesting a MapMyIndia account. Our records indicate that you already have an active MapMyIndia account. You can access your account anytime at [email or web address] Did you know that you can access your hospital and ER discharge instructions at any time in MapMyIndia? You can also review all of your test results from your hospital stay or ER visit. Additional Information If you have questions, please visit the Frequently Asked Questions section of the MapMyIndia website at [email or web address] Remember, MapMyIndia is NOT to be used for urgent needs. For medical emergencies, dial 911. Now available from your iPhone and Android! Providers Seen During Your Hospitalization Provider Specialty Primary office phone Rhonda Shah MD Obstetrics & Gynecology [phone] Immunizations Administered for This Admission Name Date MMR 12/30/2017 Tdap 12/30/2017 Your Primary Care Physician (PCP) Primary Care Physician Office Phone Office Fax NONE ** None ** ** None ** You are allergic to the following Allergen Reactions Latex Rash Itching Tree Nut Anaphylaxis Codeine Nausea and Vomiting  
    
 Sulfa (Sulfonamide Antibiotics) Nausea and Vomiting Naproxen Other (comments) Can not take due to history peptic ulcers Recent Documentation Height Weight Breastfeeding? BMI OB Status Smoking Status 1.6 m 116.6 kg Yes 45.53 kg/m2 Recent pregnancy Former Smoker Emergency Contacts Name Discharge Info Relation Home Work Mobile Danny Patel  Spouse [3]   [phone] Patient Belongings The following personal items are in your possession at time of discharge: 
  Dental Appliances: None  Visual Aid: Glasses      Home Medications: None   Jewelry: None  Clothing: Footwear, Pants, Shirt    Other Valuables: Cell Phone Please provide this summary of care documentation to your next provider. Signatures-by signing, you are acknowledging that this After Visit Summary has been reviewed with you and you have received a copy. Patient Signature:  ____________________________________________________________ Date:  ____________________________________________________________  
  
Serge Officer Provider Signature:  ____________________________________________________________ Date:  ____________________________________________________________

## 2017-12-22 NOTE — PROGRESS NOTES
Orders placed in connect care for urine protein/creat ratio and stat PIH labs. Lab called to come draw blood.

## 2017-12-22 NOTE — PROGRESS NOTES
Charge nurse notified and will admit to room 464. Dr Arian Da Silva stated that she will come and see her.

## 2017-12-23 LAB
GLUCOSE BLD STRIP.AUTO-MCNC: 128 MG/DL (ref 65–100)
GLUCOSE BLD STRIP.AUTO-MCNC: 148 MG/DL (ref 65–100)
GLUCOSE BLD STRIP.AUTO-MCNC: 72 MG/DL (ref 65–100)
GLUCOSE BLD STRIP.AUTO-MCNC: 98 MG/DL (ref 65–100)

## 2017-12-23 PROCEDURE — 59025 FETAL NON-STRESS TEST: CPT

## 2017-12-23 PROCEDURE — 74011636637 HC RX REV CODE- 636/637: Performed by: OBSTETRICS & GYNECOLOGY

## 2017-12-23 PROCEDURE — 82962 GLUCOSE BLOOD TEST: CPT

## 2017-12-23 PROCEDURE — 96372 THER/PROPH/DIAG INJ SC/IM: CPT

## 2017-12-23 PROCEDURE — 74011250636 HC RX REV CODE- 250/636: Performed by: OBSTETRICS & GYNECOLOGY

## 2017-12-23 PROCEDURE — 84156 ASSAY OF PROTEIN URINE: CPT | Performed by: OBSTETRICS & GYNECOLOGY

## 2017-12-23 PROCEDURE — 65270000029 HC RM PRIVATE

## 2017-12-23 PROCEDURE — 74011250637 HC RX REV CODE- 250/637: Performed by: OBSTETRICS & GYNECOLOGY

## 2017-12-23 RX ADMIN — INSULIN GLARGINE 16 UNITS: 100 INJECTION, SOLUTION SUBCUTANEOUS at 09:04

## 2017-12-23 RX ADMIN — INSULIN GLARGINE 16 UNITS: 100 INJECTION, SOLUTION SUBCUTANEOUS at 21:27

## 2017-12-23 RX ADMIN — INSULIN LISPRO 2 UNITS: 100 INJECTION, SOLUTION INTRAVENOUS; SUBCUTANEOUS at 19:22

## 2017-12-23 RX ADMIN — INSULIN LISPRO 26 UNITS: 100 INJECTION, SOLUTION INTRAVENOUS; SUBCUTANEOUS at 09:07

## 2017-12-23 RX ADMIN — INSULIN LISPRO 26 UNITS: 100 INJECTION, SOLUTION INTRAVENOUS; SUBCUTANEOUS at 17:53

## 2017-12-23 RX ADMIN — BETAMETHASONE SODIUM PHOSPHATE AND BETAMETHASONE ACETATE 12 MG: 3; 3 INJECTION, SUSPENSION INTRA-ARTICULAR; INTRALESIONAL; INTRAMUSCULAR at 14:06

## 2017-12-23 RX ADMIN — ZOLPIDEM TARTRATE 5 MG: 5 TABLET ORAL at 21:26

## 2017-12-23 RX ADMIN — INSULIN LISPRO 26 UNITS: 100 INJECTION, SOLUTION INTRAVENOUS; SUBCUTANEOUS at 14:12

## 2017-12-23 NOTE — PROGRESS NOTES
MFM Chart review      Preeclampsia without severe features. If remains stable, may be candidate for outpt management. However, if any concerns, will need to remain inpt. Delivery at 37 wk, sooner if concerns.      Coby Rodrigues MD

## 2017-12-23 NOTE — PROGRESS NOTES
High Risk Obstetrics Progress Note    Name: Belle Pedroza MRN: [id number]  SSN: [id number]    YOB: 1985  Age: 29 y.o. Sex: female      Subjective:      LOS: 1 day    Estimated Date of Delivery: 1/16/18   Gestational Age Today: 37w2d     Patient admitted for preeclampsia. States she does not have contractions, headache , right upper quadrant pain  , vaginal bleeding , vaginal leaking of fluid  and visual disturbances. Problem List  Date Reviewed: 12/22/2017          Codes Class Noted    * (Principal)PreEclampsia withOUT severe features  ICD-10-CM: O13.9  ICD-9-CM: 642.33  12/22/2017        Supervision of high risk pregnancy in third trimester ICD-10-CM: O09.93  ICD-9-CM: V23.9  9/19/2017        Pre-existing type 2 diabetes mellitus in pregnancy in third trimester ICD-10-CM: O24.113  ICD-9-CM: 648.03, 250.00  9/19/2017    Overview Addendum 12/22/2017  1:42 PM by Josephine Marquez MD     12/12/2017 at [facility]:  Appropriate fetal growth, reassuring fetal status. AC 17%, overall 22%, ED 17.8 cm, UA Dopplers WNL, BPP 8/8. Glucose log reviewed. Ranges from 77 to 164. Several PP's elevated. Reviewed diet and suggested increase in protein. Loosen PP parameters <140. Current Dose is now Levemir 16/16 units and Humalog 20/20/20. · Follow up at [facility] in 3 weeks for fetal growth and BPP and MD/DM. · Continue twice weekly fetal testing at 34 weeks in primary OB office. · Continue QID BG testing and send logs weekly to OB and OUR office. · Continue Levemir; increase Humalog with meals. Adjust insulin prn for elevations. · Fetal kick counts stressed. · Plan induction at 39 weeks. 12/22/2017 [facility] Inpt- admission for preE rule out  Lantus 16 BID; Humalog 26 w meals, SSI prn  If remains pregnant, back insulin to humalog to 20units w meals on 12/26.               Tobacco smoking complicating pregnancy, third trimester ICD-10-CM: K83.030  ICD-9-CM: 649.03  9/19/2017    Overview Addendum 12/12/2017  9:03 AM by Camron Beltran RN     2017 at [facility]: 1-2 cigarettes per day. 10/10/2017 at [facility]:  1-2 cigarettes per day. 2017 at [facility]:  Smoking 2-3 cigarettes/day. 2017 at [facility]:  Decreased to 1 cigarette every other day. · Patient counselled regarding dangers to her and fetus from tobacco use including IUGR, Abruption and PPROM and PTD. · She will f/u with you regarding options to help with cessation. Wellbutrin and Nicotine patches are pregnancy-safe options if unable to stop smoking. Recommend avoidance of e-cigarettes/vaping due to concerns of impact on placental function. Pregnancy w/ hx of uterine myomectomy ICD-10-CM: O34.29  ICD-9-CM: 654.90  2017    Overview Addendum 10/10/2017  1:07 PM by Princess Valenzuela MD     10/10/2017 at [facility]:  Patient with Hx of Hysteroscopy, D&C, Myomectomy with Myosure. · Unlikely to have impacted myometrium to the point that c/s is required in the early term period. Obesity affecting pregnancy in third trimester ICD-10-CM: O99.213  ICD-9-CM: 649.13  2017    Overview Addendum 2017 12:22 AM by Angélica Blanc MD     34yo   Obese, PCOS - on metformin, stopped after 1st trimester, early GTT pending at 16 wks -failed 1hr, failed 3 hr. Start checking sugars & refer to [facility] and M.  has child from previous marriage that lives with them  On baby ASA and vit D, calcium - stop baby ASA at 36 wks  Incomplete anatomy US - due to position and BMI. Face, heart, nose/lips, diaphragm - all incomplete. BL small CPCs - discussed. Repeat at 24 wks unless done at [facility] close to. Uterine fibroids affecting pregnancy, third trimester ICD-10-CM: O34.13, D25.9  ICD-9-CM: 654.13, 218.9  2017    Overview Addendum 2017 11:38 PM by Angélica Blanc MD     Pt s/p \"myomectomy\" by Dr. Chitra Samuels - performed by hysteroscopic resection (Myosure) and pathology consistent with benign endomyometrial tissue.  Fibroid remained the same on comparison of pre and post procedure ultrasounds. Very unlikely that pt had a deep resection and should not require  delivery due to this procedure. Polycystic ovary complicating pregnancy, antepartum ICD-10-CM: O99.89, E28.2  ICD-9-CM: 646.83, 256.4  2014                Vitals:  Blood pressure 136/86, pulse 80, temperature 98.4 °F (36.9 °C), resp. rate 20, weight 259 lb 6 oz (117.7 kg), last menstrual period 2017, currently breastfeeding. Temp (24hrs), Av.4 °F (36.9 °C), Min:98.3 °F (36.8 °C), Max:98.4 °F (41.4 °C)    Systolic (35IBB), NAP:195 , Min:130 , KV      Diastolic (83ZRC), LOF:98, Min:54, Max:86       Intake and Output:          Physical Exam:  Patient without distress. Fundus: soft and non tender  Lower Extremities:  - Edema 1+   - Patellar Reflexes: 1+ bilaterally   - Clonus: absent       Membranes:  Intact    Uterine Activity:  None    Fetal Heart Rate:  Baseline: 130s per minute  Accelerations: yes        Labs:   Recent Results (from the past 36 hour(s))   CBC W/O DIFF    Collection Time: 17 12:13 PM   Result Value Ref Range    WBC 11.5 (H) 4.3 - 11.1 K/uL    RBC 4.37 4.05 - 5.25 M/uL    HGB 12.6 11.7 - 15.4 g/dL    HCT 36.8 35.8 - 46.3 %    MCV 84.2 79.6 - 97.8 FL    MCH 28.8 26.1 - 32.9 PG    MCHC 34.2 31.4 - 35.0 g/dL    RDW 12.8 11.9 - 14.6 %    PLATELET 810 113 - 722 K/uL    MPV 9.9 (L) 10.8 - 14.1 FL   HEPATIC FUNCTION PANEL    Collection Time: 17 12:13 PM   Result Value Ref Range    Protein, total 6.3 6.3 - 8.2 g/dL    Albumin 2.6 (L) 3.5 - 5.0 g/dL    Globulin 3.7 (H) 2.3 - 3.5 g/dL    A-G Ratio 0.7 (L) 1.2 - 3.5      Bilirubin, total 0.2 0.2 - 1.1 MG/DL    Bilirubin, direct 0.1 <0.4 MG/DL    Alk.  phosphatase 172 (H) 50 - 136 U/L    AST (SGOT) 14 (L) 15 - 37 U/L    ALT (SGPT) 17 12 - 65 U/L   LD    Collection Time: 17 12:13 PM   Result Value Ref Range     100 - 190 U/L   URIC ACID    Collection Time: 12/22/17 12:13 PM   Result Value Ref Range    Uric acid 4.5 2.6 - 6.0 MG/DL   METABOLIC PANEL, BASIC    Collection Time: 12/22/17 12:13 PM   Result Value Ref Range    Sodium 139 136 - 145 mmol/L    Potassium 4.0 3.5 - 5.1 mmol/L    Chloride 102 98 - 107 mmol/L    CO2 26 21 - 32 mmol/L    Anion gap 11 7 - 16 mmol/L    Glucose 88 65 - 100 mg/dL    BUN 10 6 - 23 MG/DL    Creatinine 0.53 (L) 0.6 - 1.0 MG/DL    GFR est AA >60 >60 ml/min/1.73m2    GFR est non-AA >60 >60 ml/min/1.73m2    Calcium 9.4 8.3 - 10.4 MG/DL   PROTEIN/CREATININE RATIO, URINE    Collection Time: 12/22/17 12:14 PM   Result Value Ref Range    Protein, urine random 28 (H) <11.9 mg/dL    Creatinine, urine 104.88 mg/dL    Protein/Creat. urine Ratio 0.3     GLUCOSE, POC    Collection Time: 12/22/17  8:35 PM   Result Value Ref Range    Glucose (POC) 133 (H) 65 - 100 mg/dL   GLUCOSE, POC    Collection Time: 12/23/17  6:11 AM   Result Value Ref Range    Glucose (POC) 72 65 - 100 mg/dL   GLUCOSE, POC    Collection Time: 12/23/17 10:10 AM   Result Value Ref Range    Glucose (POC) 98 65 - 100 mg/dL   GLUCOSE, POC    Collection Time: 12/23/17  4:05 PM   Result Value Ref Range    Glucose (POC) 128 (H) 65 - 100 mg/dL     Current Facility-Administered Medications   Medication Dose Route Frequency    insulin lispro (HUMALOG) injection 26 Units  26 Units SubCUTAneous TIDAC    insulin lispro (HUMALOG) injection   SubCUTAneous PRN    insulin glargine (LANTUS) injection 16 Units  16 Units SubCUTAneous ACB/HS    zolpidem (AMBIEN) tablet 5 mg  5 mg Oral QHS PRN           Assessment and Plan:      Principal Problem:    PreEclampsia withOUT severe features  (12/22/2017)    Active Problems:    Pre-existing type 2 diabetes mellitus in pregnancy in third trimester (9/19/2017)      Overview: 12/12/2017 at [facility]:  Appropriate fetal growth, reassuring fetal status. AC 17%, overall 22%, ED 17.8 cm, UA Dopplers WNL, BPP 8/8. Glucose log       reviewed.   Ranges from 77 to 164. Several PP's elevated. Reviewed diet and       suggested increase in protein. Loosen PP parameters <140. Current Dose is now Levemir 16/16 units and Humalog 20/20/20. · Follow up at [facility] in 3 weeks for fetal growth and BPP and MD/DM. · Continue twice weekly fetal testing at 34 weeks in primary OB office. · Continue QID BG testing and send logs weekly to OB and OUR office. · Continue Levemir; increase Humalog with meals. Adjust insulin prn for       elevations. · Fetal kick counts stressed. · Plan induction at 39 weeks. 12/22/2017 [facility] Inpt- admission for preE rule out      Lantus 16 BID; Humalog 26 w meals, SSI prn      If remains pregnant, back insulin to humalog to 20units w meals on 12/26. Preeclampsia:  mild  Complete 48 hour course of steroids to promote fetal lung maturity.   Strict Input and Output and Daily weights  Check 24 hour urine for total Protein  Daily Fetal monitoring with Non-stress tests     A2DM:  Continue Lantus 16 ACB & HS and Humalog 26 tid AC    Signed By: Paulino Pina MD     December 23, 2017

## 2017-12-24 LAB
COLLECT DURATION TIME UR: 24 HR
GLUCOSE BLD STRIP.AUTO-MCNC: 126 MG/DL (ref 65–100)
GLUCOSE BLD STRIP.AUTO-MCNC: 80 MG/DL (ref 65–100)
GLUCOSE BLD STRIP.AUTO-MCNC: 91 MG/DL (ref 65–100)
GLUCOSE BLD STRIP.AUTO-MCNC: 97 MG/DL (ref 65–100)
PROT 24H UR-MRATE: 720 MG/24HR
PROT UR-MCNC: 20 MG/DL
SPECIMEN VOL ?TM UR: 3600 ML

## 2017-12-24 PROCEDURE — 74011636637 HC RX REV CODE- 636/637: Performed by: OBSTETRICS & GYNECOLOGY

## 2017-12-24 PROCEDURE — 59025 FETAL NON-STRESS TEST: CPT

## 2017-12-24 PROCEDURE — 74011250637 HC RX REV CODE- 250/637: Performed by: OBSTETRICS & GYNECOLOGY

## 2017-12-24 PROCEDURE — 82962 GLUCOSE BLOOD TEST: CPT

## 2017-12-24 PROCEDURE — 65270000029 HC RM PRIVATE

## 2017-12-24 RX ORDER — INSULIN LISPRO 100 [IU]/ML
20 INJECTION, SOLUTION INTRAVENOUS; SUBCUTANEOUS
Status: DISCONTINUED | OUTPATIENT
Start: 2017-12-24 | End: 2017-12-28

## 2017-12-24 RX ADMIN — INSULIN LISPRO 20 UNITS: 100 INJECTION, SOLUTION INTRAVENOUS; SUBCUTANEOUS at 17:41

## 2017-12-24 RX ADMIN — INSULIN LISPRO 20 UNITS: 100 INJECTION, SOLUTION INTRAVENOUS; SUBCUTANEOUS at 14:18

## 2017-12-24 RX ADMIN — INSULIN GLARGINE 16 UNITS: 100 INJECTION, SOLUTION SUBCUTANEOUS at 08:20

## 2017-12-24 RX ADMIN — INSULIN GLARGINE 16 UNITS: 100 INJECTION, SOLUTION SUBCUTANEOUS at 20:52

## 2017-12-24 RX ADMIN — INSULIN LISPRO 26 UNITS: 100 INJECTION, SOLUTION INTRAVENOUS; SUBCUTANEOUS at 08:19

## 2017-12-24 RX ADMIN — ZOLPIDEM TARTRATE 5 MG: 5 TABLET ORAL at 22:35

## 2017-12-24 NOTE — PROGRESS NOTES
Jesusita Dempsey [facility]   29 y.o.  1985  [id number]    Chief Complaint   Patient presents with    Hypertension     36.3 weeks     Reports GFM. Has no compaints. Denies:  HA, visual changes and epigastric/RUQ pain. Physical Exam:  Visit Vitals    /67    Pulse 64    Temp 98.4 °F (36.9 °C)    Resp 18    Wt 259 lb 6 oz (117.7 kg)    LMP 04/11/2017    Breastfeeding Yes    BMI 45.95 kg/m2     Patient Vitals for the past 24 hrs:   Temp Pulse Resp BP   12/24/17 1347 - 64 18 134/67   12/24/17 0822 98.4 °F (36.9 °C) 65 - 150/71   12/24/17 0821 - 67 - 179/89   12/24/17 0513 - 74 - 141/80   12/23/17 2349 98.7 °F (37.1 °C) 71 - 125/60   12/23/17 1916 98.3 °F (36.8 °C) 73 18 143/83   12/23/17 1453 - 80 - 136/86       Patient is a 29 y.o. female who appears pleasant, in no apparent distress, her given age, well developed, well nourished and with good attention to hygiene and body habitus. Oriented to person, place and time. Mood and affect normal and appropriate to situation. Head is normocephalic, atraumatic, without any gross head or neck masses. Fundus soft, NT. Lower Extremities:  - Edema 1+ (appears to be less than yesterday). - Patellar Reflexes: 1+ bilaterally   - Clonus: absent        Reactive NST this am.      24 hour urine 720 mg protein. ASSESSMENT and PLAN  1. Chronic HTN with superimposed PreE. Steroid mature, will be 37 wk on Tue. Plan cervical ripening tomorrow evening. D/w Dr. Luisito Carroll, she concurs. Recheck PreE labs in am.  2.  A2DM, continue insulin. ICD-10-CM ICD-9-CM    1.  Pre-existing type 2 diabetes mellitus in pregnancy in third trimester O24.113 648.03 insulin lispro (HUMALOG) injection     250.00 insulin lispro (HUMALOG) injection 20 Units      DISCONTINUED: insulin lispro (HUMALOG) injection 26 Units           Wyatt Acevedo MD

## 2017-12-24 NOTE — PROGRESS NOTES
Per dr Tayler Torres via phone call- change humalog from 26 units with food to 20 units.  MAR adjusted

## 2017-12-24 NOTE — PROGRESS NOTES
Pt sittting up watching TV. Deneis ABAD, Visual Disturbances or Epigastric pain. Also denies cramping or ctx.

## 2017-12-24 NOTE — PROGRESS NOTES
Assessment done. Denies HA, Visual disturbance or epigastric pain.  Placed on monitor for NST.  2 hour . 2 U of Humalog SQ given

## 2017-12-25 PROBLEM — Z37.9 NORMAL LABOR: Status: ACTIVE | Noted: 2017-12-25

## 2017-12-25 LAB
ABO + RH BLD: NORMAL
ALBUMIN SERPL-MCNC: 2.6 G/DL (ref 3.5–5)
ALBUMIN/GLOB SERPL: 0.7 {RATIO} (ref 1.2–3.5)
ALP SERPL-CCNC: 167 U/L (ref 50–136)
ALT SERPL-CCNC: 18 U/L (ref 12–65)
ANION GAP SERPL CALC-SCNC: 11 MMOL/L (ref 7–16)
AST SERPL-CCNC: 18 U/L (ref 15–37)
BILIRUB SERPL-MCNC: 0.2 MG/DL (ref 0.2–1.1)
BLOOD GROUP ANTIBODIES SERPL: NORMAL
BUN SERPL-MCNC: 12 MG/DL (ref 6–23)
CALCIUM SERPL-MCNC: 9 MG/DL (ref 8.3–10.4)
CHLORIDE SERPL-SCNC: 103 MMOL/L (ref 98–107)
CO2 SERPL-SCNC: 25 MMOL/L (ref 21–32)
CREAT SERPL-MCNC: 0.55 MG/DL (ref 0.6–1)
ERYTHROCYTE [DISTWIDTH] IN BLOOD BY AUTOMATED COUNT: 12.4 % (ref 11.9–14.6)
GLOBULIN SER CALC-MCNC: 3.8 G/DL (ref 2.3–3.5)
GLUCOSE BLD STRIP.AUTO-MCNC: 112 MG/DL (ref 65–100)
GLUCOSE BLD STRIP.AUTO-MCNC: 123 MG/DL (ref 65–100)
GLUCOSE BLD STRIP.AUTO-MCNC: 63 MG/DL (ref 65–100)
GLUCOSE BLD STRIP.AUTO-MCNC: 87 MG/DL (ref 65–100)
GLUCOSE BLD STRIP.AUTO-MCNC: 92 MG/DL (ref 65–100)
GLUCOSE SERPL-MCNC: 96 MG/DL (ref 65–100)
GLUCOSE, GLUUPC: NEGATIVE
HCT VFR BLD AUTO: 36.2 % (ref 35.8–46.3)
HGB BLD-MCNC: 12.7 G/DL (ref 11.7–15.4)
KETONES UR-MCNC: NEGATIVE MG/DL
LDH SERPL L TO P-CCNC: 192 U/L (ref 100–190)
MCH RBC QN AUTO: 29.7 PG (ref 26.1–32.9)
MCHC RBC AUTO-ENTMCNC: 35.1 G/DL (ref 31.4–35)
MCV RBC AUTO: 84.6 FL (ref 79.6–97.8)
PLATELET # BLD AUTO: 293 K/UL (ref 150–450)
PMV BLD AUTO: 9.9 FL (ref 10.8–14.1)
POTASSIUM SERPL-SCNC: 4.5 MMOL/L (ref 3.5–5.1)
PROT SERPL-MCNC: 6.4 G/DL (ref 6.3–8.2)
PROT UR QL: ABNORMAL
RBC # BLD AUTO: 4.28 M/UL (ref 4.05–5.25)
SODIUM SERPL-SCNC: 139 MMOL/L (ref 136–145)
SPECIMEN EXP DATE BLD: NORMAL
URATE SERPL-MCNC: 5 MG/DL (ref 2.6–6)
WBC # BLD AUTO: 13.9 K/UL (ref 4.3–11.1)

## 2017-12-25 PROCEDURE — 74011250637 HC RX REV CODE- 250/637: Performed by: OBSTETRICS & GYNECOLOGY

## 2017-12-25 PROCEDURE — 36415 COLL VENOUS BLD VENIPUNCTURE: CPT | Performed by: OBSTETRICS & GYNECOLOGY

## 2017-12-25 PROCEDURE — 86900 BLOOD TYPING SEROLOGIC ABO: CPT | Performed by: OBSTETRICS & GYNECOLOGY

## 2017-12-25 PROCEDURE — 84550 ASSAY OF BLOOD/URIC ACID: CPT | Performed by: OBSTETRICS & GYNECOLOGY

## 2017-12-25 PROCEDURE — 80053 COMPREHEN METABOLIC PANEL: CPT | Performed by: OBSTETRICS & GYNECOLOGY

## 2017-12-25 PROCEDURE — 65270000029 HC RM PRIVATE

## 2017-12-25 PROCEDURE — 74011636637 HC RX REV CODE- 636/637: Performed by: OBSTETRICS & GYNECOLOGY

## 2017-12-25 PROCEDURE — 81002 URINALYSIS NONAUTO W/O SCOPE: CPT | Performed by: OBSTETRICS & GYNECOLOGY

## 2017-12-25 PROCEDURE — 82962 GLUCOSE BLOOD TEST: CPT

## 2017-12-25 PROCEDURE — 83615 LACTATE (LD) (LDH) ENZYME: CPT | Performed by: OBSTETRICS & GYNECOLOGY

## 2017-12-25 PROCEDURE — 85027 COMPLETE CBC AUTOMATED: CPT | Performed by: OBSTETRICS & GYNECOLOGY

## 2017-12-25 RX ORDER — MINERAL OIL
120 OIL (ML) ORAL
Status: DISCONTINUED | OUTPATIENT
Start: 2017-12-25 | End: 2017-12-28 | Stop reason: HOSPADM

## 2017-12-25 RX ORDER — ONDANSETRON 2 MG/ML
4 INJECTION INTRAMUSCULAR; INTRAVENOUS
Status: COMPLETED | OUTPATIENT
Start: 2017-12-25 | End: 2017-12-27

## 2017-12-25 RX ORDER — LIDOCAINE HYDROCHLORIDE 20 MG/ML
JELLY TOPICAL
Status: DISCONTINUED | OUTPATIENT
Start: 2017-12-25 | End: 2017-12-28 | Stop reason: HOSPADM

## 2017-12-25 RX ORDER — SODIUM CHLORIDE 0.9 % (FLUSH) 0.9 %
5-10 SYRINGE (ML) INJECTION EVERY 8 HOURS
Status: DISCONTINUED | OUTPATIENT
Start: 2017-12-25 | End: 2017-12-28

## 2017-12-25 RX ORDER — SODIUM CHLORIDE 0.9 % (FLUSH) 0.9 %
5-10 SYRINGE (ML) INJECTION AS NEEDED
Status: DISCONTINUED | OUTPATIENT
Start: 2017-12-25 | End: 2017-12-28

## 2017-12-25 RX ORDER — DEXTROSE, SODIUM CHLORIDE, SODIUM LACTATE, POTASSIUM CHLORIDE, AND CALCIUM CHLORIDE 5; .6; .31; .03; .02 G/100ML; G/100ML; G/100ML; G/100ML; G/100ML
125 INJECTION, SOLUTION INTRAVENOUS CONTINUOUS
Status: DISCONTINUED | OUTPATIENT
Start: 2017-12-26 | End: 2017-12-28

## 2017-12-25 RX ORDER — OXYTOCIN/0.9 % SODIUM CHLORIDE 15/250 ML
250 PLASTIC BAG, INJECTION (ML) INTRAVENOUS ONCE
Status: ACTIVE | OUTPATIENT
Start: 2017-12-25 | End: 2017-12-26

## 2017-12-25 RX ORDER — LIDOCAINE HYDROCHLORIDE 10 MG/ML
1 INJECTION INFILTRATION; PERINEURAL
Status: DISCONTINUED | OUTPATIENT
Start: 2017-12-25 | End: 2017-12-28 | Stop reason: HOSPADM

## 2017-12-25 RX ORDER — BUTORPHANOL TARTRATE 1 MG/ML
1 INJECTION INTRAMUSCULAR; INTRAVENOUS
Status: DISCONTINUED | OUTPATIENT
Start: 2017-12-25 | End: 2017-12-28 | Stop reason: HOSPADM

## 2017-12-25 RX ADMIN — INSULIN GLARGINE 16 UNITS: 100 INJECTION, SOLUTION SUBCUTANEOUS at 22:24

## 2017-12-25 RX ADMIN — INSULIN LISPRO 20 UNITS: 100 INJECTION, SOLUTION INTRAVENOUS; SUBCUTANEOUS at 18:19

## 2017-12-25 RX ADMIN — INSULIN LISPRO 20 UNITS: 100 INJECTION, SOLUTION INTRAVENOUS; SUBCUTANEOUS at 08:45

## 2017-12-25 RX ADMIN — INSULIN GLARGINE 16 UNITS: 100 INJECTION, SOLUTION SUBCUTANEOUS at 08:48

## 2017-12-25 RX ADMIN — MISOPROSTOL 25 MCG: 100 TABLET ORAL at 20:46

## 2017-12-25 RX ADMIN — INSULIN LISPRO 20 UNITS: 100 INJECTION, SOLUTION INTRAVENOUS; SUBCUTANEOUS at 13:18

## 2017-12-25 NOTE — PROGRESS NOTES
Reactive NST obtained; EFM discontinued; positive fetal movement reported       12/25/17 1031   Fetal Vital Signs   Mode External   Fetal Heart Rate 130   Fetal Activity Present   Variability 6-25 BPM   Decelerations None   Accelerations Yes   RN Reviewed Strip?  Yes   Non Stress Test Reactive   Uterine Activity   Mode External   Frequency (min) 0   Uterine Resting Tone Relaxed

## 2017-12-25 NOTE — PROGRESS NOTES
Patient eating breakfast; instructed to call when finished for EFM adjustment per Virginia Sanders RN

## 2017-12-25 NOTE — PROGRESS NOTES
Patient reported sweet urine odor; see POC urine dip results below; Dr Natalia Thomas notified; no new orders received    Results for Jack Pablo (MRN [id number]) as of 12/25/2017 10:57   Ref.  Range 12/25/2017 10:40   Protein (POC) Latest Ref Range: Negative  Trace   Glucose, urine (POC) Latest Ref Range: Negative  Negative   Ketones (POC) Latest Ref Range: Negative  Negative

## 2017-12-25 NOTE — PROGRESS NOTES
Shift assessment completed; no complaints at present; family/visitors at bedside       12/25/17 1453   Maternal Vital Signs   Temp 98.5 °F (36.9 °C)   Temp Source Axillary   Pulse (Heart Rate) 69   Resp Rate 18   O2 Sat (%) 97 %   Level of Consciousness Alert   /64   MAP (Calculated) 88   BP 1 Method Automatic   BP 1 Location Left arm   BP Patient Position At rest;Head of bed elevated (Comment degrees)   MEWS Score 1   Fetal Vital Signs   Fetal Activity Present   Pain 1   Pain Scale 1 Numeric (0 - 10)   Pain Intensity 1 0   Spinal Dermatomes   Motor Function Ambulate w/o assistance   Oxygen Therapy   O2 Device Room air   Patient Observation   Patient Turned Turns self   Ambulate Ambulate to bathroom   Edema   LLE Trace   RLE Trace   Pre-Eclampsia Assessment   Headache No   Visual Disturbances No   Epigastric Pain No   DTR   Deep Tendon Reflex Response-LLE +2   Deep Tendon Reflex Response-RLE +2

## 2017-12-25 NOTE — PROGRESS NOTES
Pt resting in bed; shift assessment complete; NST started. EFM and TOCO in place and tracing. Pt to call when breakfast arrives for blood sugar.

## 2017-12-25 NOTE — PROGRESS NOTES
AntePartum High Risk Pregnancy Note    [facility] Day: 4    Patient is a 32yo  admitted with Pre Eclampsia w/OUT severe features, now at 36w6d. Denies SOB/CP, RUQ pain, scotomata. Vitals:    Patient Vitals for the past 24 hrs:   BP Temp Pulse Resp SpO2 Height Weight   17 1030 - - - - - - 257 lb (116.6 kg)   17 0959 110/61 98.4 °F (36.9 °C) 67 18 97 % 5' 3\" (1.6 m) 259 lb (117.5 kg)   17 0809 116/68 98 °F (36.7 °C) 61 - - - -   17 2236 131/73 - 68 - - - -   17 2235 - 98.5 °F (36.9 °C) - 19 - - -   17 2004 132/65 99.1 °F (37.3 °C) 73 17 - - -   17 1347 134/67 - 64 18 - - -     Temp (24hrs), Av.5 °F (36.9 °C), Min:98 °F (36.7 °C), Max:99.1 °F (37.3 °C)          Physical Exam:  Constitutional: She appears well-developed and well-nourished. No distress. HENT:    Head: Normocephalic and atraumatic. Lungs: CTAB, effort normal, no rales/crackles  Cardiovascular: RRR, no M/R/G  Abd:  Gravid, no RUQ TTP  Skin: She is not diaphoretic. Psychiatric: She has a normal mood and affect.  Her behavior is normal. Thought content normal.   Exts:  DTRs 2+, no c/c/e         Labs:   Recent Results (from the past 24 hour(s))   GLUCOSE, POC    Collection Time: 17  4:18 PM   Result Value Ref Range    Glucose (POC) 126 (H) 65 - 100 mg/dL   GLUCOSE, POC    Collection Time: 17  8:50 PM   Result Value Ref Range    Glucose (POC) 80 65 - 100 mg/dL   GLUCOSE, POC    Collection Time: 17  5:56 AM   Result Value Ref Range    Glucose (POC) 63 (L) 65 - 100 mg/dL   GLUCOSE, POC    Collection Time: 17  8:43 AM   Result Value Ref Range    Glucose (POC) 92 65 - 100 mg/dL   GLUCOSE, POC    Collection Time: 17 10:28 AM   Result Value Ref Range    Glucose (POC) 123 (H) 65 - 100 mg/dL   POC URINE DIPSTICK MANUAL    Collection Time: 17 10:40 AM   Result Value Ref Range    Protein (POC) Trace Negative Glucose, urine (POC) Negative Negative    Ketones (POC) Negative Negative       Assessment and Plan:     29 y.o. John Paul Ordonez at 36w6d with PreEclampsia w/OUT severe features:    1) Mild PreE:   -plan for IOL tonight w/ Cytotec (unless develops severe features) w/ anticipation of delivery tomorrow at 37w0d    -No Mag unless develops severe features   -repeat HELLP labs tonight    -steroid mature      2) T2DM:               -16/16 Lantus and humalog increased to 26/26/26 w/ meals along w/ SSI per MFM                -expected elevations due to [address] Man       Patient Active Problem List    Diagnosis Date Noted    PreEclampsia withOUT severe features  12/22/2017    Supervision of high risk pregnancy in third trimester 09/19/2017    Pre-existing type 2 diabetes mellitus in pregnancy in third trimester 09/19/2017    Tobacco smoking complicating pregnancy, third trimester 09/19/2017    Pregnancy w/ hx of uterine myomectomy 09/18/2017    Obesity affecting pregnancy in third trimester 08/09/2017    Uterine fibroids affecting pregnancy, third trimester 07/04/2017    Polycystic ovary complicating pregnancy, antepartum 07/13/2014         Neno Head MD

## 2017-12-26 LAB
GLUCOSE BLD STRIP.AUTO-MCNC: 144 MG/DL (ref 65–100)
GLUCOSE BLD STRIP.AUTO-MCNC: 66 MG/DL (ref 65–100)
GLUCOSE BLD STRIP.AUTO-MCNC: 68 MG/DL (ref 65–100)
GLUCOSE BLD STRIP.AUTO-MCNC: 75 MG/DL (ref 65–100)
GLUCOSE BLD STRIP.AUTO-MCNC: 77 MG/DL (ref 65–100)
GLUCOSE BLD STRIP.AUTO-MCNC: 78 MG/DL (ref 65–100)

## 2017-12-26 PROCEDURE — 74011250636 HC RX REV CODE- 250/636: Performed by: OBSTETRICS & GYNECOLOGY

## 2017-12-26 PROCEDURE — 74011250637 HC RX REV CODE- 250/637: Performed by: OBSTETRICS & GYNECOLOGY

## 2017-12-26 PROCEDURE — 65270000029 HC RM PRIVATE

## 2017-12-26 PROCEDURE — 74011000250 HC RX REV CODE- 250: Performed by: OBSTETRICS & GYNECOLOGY

## 2017-12-26 PROCEDURE — 74011636637 HC RX REV CODE- 636/637: Performed by: OBSTETRICS & GYNECOLOGY

## 2017-12-26 PROCEDURE — 82962 GLUCOSE BLOOD TEST: CPT

## 2017-12-26 RX ADMIN — BUTORPHANOL TARTRATE 1 MG: 1 INJECTION, SOLUTION INTRAMUSCULAR; INTRAVENOUS at 13:37

## 2017-12-26 RX ADMIN — MISOPROSTOL 50 MCG: 100 TABLET ORAL at 12:00

## 2017-12-26 RX ADMIN — INSULIN GLARGINE 16 UNITS: 100 INJECTION, SOLUTION SUBCUTANEOUS at 23:11

## 2017-12-26 RX ADMIN — ZOLPIDEM TARTRATE 5 MG: 5 TABLET ORAL at 23:16

## 2017-12-26 RX ADMIN — ZOLPIDEM TARTRATE 5 MG: 5 TABLET ORAL at 00:41

## 2017-12-26 RX ADMIN — SODIUM CHLORIDE 12.5 MG: 9 INJECTION INTRAMUSCULAR; INTRAVENOUS; SUBCUTANEOUS at 13:37

## 2017-12-26 RX ADMIN — DINOPROSTONE 10 MG: 10 INSERT VAGINAL at 19:15

## 2017-12-26 RX ADMIN — MISOPROSTOL 50 MCG: 100 TABLET ORAL at 00:42

## 2017-12-26 RX ADMIN — MISOPROSTOL 50 MCG: 100 TABLET ORAL at 07:15

## 2017-12-26 NOTE — PROGRESS NOTES
Dr. Natalia Thomas notified of blood sugar 87. Lantus to be given per [facility] as ordered.  Orders read back to and confirmed by MD.

## 2017-12-26 NOTE — PROGRESS NOTES
Pt resting in bed. Repositioned for comfort. Denies ay needs at this time. Call light is within reach.

## 2017-12-26 NOTE — PROGRESS NOTES
Dr. Deena Vasquez informed of pt recent SVE Fingertip/50/-3 and administration of Cytotec per [facility]. Pt to receive another dose of Cytotec 50 mcg.

## 2017-12-26 NOTE — PROGRESS NOTES
Shift assessment complete. See flowsheet for details. POC reviewed with pt and pt verbalized understanding. Pt has no questions or concerns at this time. Pt encouraged to call out PRN. Pt verbalized understanding. Pt is now quietly resting in bed with family present. Call light within reach.

## 2017-12-26 NOTE — PROGRESS NOTES
Dr Macho Santoyo notified myomectomy may be a contraindication to cytotec induction; unable to access patient's medical record to review physician's note; Dr Macho Santoyo aware of patient's history and order received to proceed with cytotec induction

## 2017-12-26 NOTE — PROGRESS NOTES
Pt in bed; restless, pt complains of pain and requests pain medicine at this time. SVE per flow sheet.

## 2017-12-26 NOTE — PROGRESS NOTES
BP's OK, sugars running a little low. No response to cytotec - still cl/th (per recent RN exam). FHTs reassuring. Marie Burns Options reviewed. Will change to cervidil.

## 2017-12-26 NOTE — PROGRESS NOTES
EFM turned off; pt disconnected from IV fluid; pt allowed to get up and walk, eat, shower, etc. Will place cervidil at 6pm.

## 2017-12-26 NOTE — PROGRESS NOTES
Pt seen, FHR tracing reviewed. Chart reviewed earlier, induction for Mild PreE + GDM. Still cl/th per RN. FHR reassuring.  POC reviewed, will continue cytotec for now, consider other options as needed, pt aware process could take a while, is very interested in attaining vaginal delivery,

## 2017-12-26 NOTE — PROGRESS NOTES
Spoke with Dr. Allen Moe; gave update on pt including pt is still closed; orders received to give next dose of cytotec. Will call MD before the following dose is due.

## 2017-12-27 ENCOUNTER — ANESTHESIA EVENT (OUTPATIENT)
Dept: LABOR AND DELIVERY | Age: 32
End: 2017-12-27
Payer: COMMERCIAL

## 2017-12-27 ENCOUNTER — ANESTHESIA (OUTPATIENT)
Dept: LABOR AND DELIVERY | Age: 32
End: 2017-12-27
Payer: COMMERCIAL

## 2017-12-27 LAB
GLUCOSE BLD STRIP.AUTO-MCNC: 100 MG/DL (ref 65–100)
GLUCOSE BLD STRIP.AUTO-MCNC: 68 MG/DL (ref 65–100)
GLUCOSE BLD STRIP.AUTO-MCNC: 84 MG/DL (ref 65–100)
GLUCOSE BLD STRIP.AUTO-MCNC: 84 MG/DL (ref 65–100)
GLUCOSE BLD STRIP.AUTO-MCNC: 96 MG/DL (ref 65–100)

## 2017-12-27 PROCEDURE — 77030011943

## 2017-12-27 PROCEDURE — 74011258636 HC RX REV CODE- 258/636: Performed by: OBSTETRICS & GYNECOLOGY

## 2017-12-27 PROCEDURE — 74011250636 HC RX REV CODE- 250/636: Performed by: OBSTETRICS & GYNECOLOGY

## 2017-12-27 PROCEDURE — 74011250636 HC RX REV CODE- 250/636

## 2017-12-27 PROCEDURE — 74011250637 HC RX REV CODE- 250/637: Performed by: ANESTHESIOLOGY

## 2017-12-27 PROCEDURE — 82962 GLUCOSE BLOOD TEST: CPT

## 2017-12-27 PROCEDURE — 65270000029 HC RM PRIVATE

## 2017-12-27 PROCEDURE — A4300 CATH IMPL VASC ACCESS PORTAL: HCPCS | Performed by: ANESTHESIOLOGY

## 2017-12-27 PROCEDURE — 77030014125 HC TY EPDRL BBMI -B: Performed by: ANESTHESIOLOGY

## 2017-12-27 PROCEDURE — 74011000250 HC RX REV CODE- 250

## 2017-12-27 RX ORDER — LIDOCAINE HYDROCHLORIDE AND EPINEPHRINE 15; 5 MG/ML; UG/ML
INJECTION, SOLUTION EPIDURAL AS NEEDED
Status: DISCONTINUED | OUTPATIENT
Start: 2017-12-27 | End: 2017-12-28 | Stop reason: HOSPADM

## 2017-12-27 RX ORDER — SODIUM CHLORIDE 0.9 % (FLUSH) 0.9 %
5-10 SYRINGE (ML) INJECTION AS NEEDED
Status: DISCONTINUED | OUTPATIENT
Start: 2017-12-27 | End: 2017-12-28 | Stop reason: HOSPADM

## 2017-12-27 RX ORDER — OXYTOCIN/RINGER'S LACTATE 30/500 ML
.5-42 PLASTIC BAG, INJECTION (ML) INTRAVENOUS
Status: DISCONTINUED | OUTPATIENT
Start: 2017-12-27 | End: 2017-12-28

## 2017-12-27 RX ORDER — ROPIVACAINE HYDROCHLORIDE 2 MG/ML
INJECTION, SOLUTION EPIDURAL; INFILTRATION; PERINEURAL
Status: DISCONTINUED | OUTPATIENT
Start: 2017-12-27 | End: 2017-12-28 | Stop reason: HOSPADM

## 2017-12-27 RX ORDER — FAMOTIDINE 20 MG/1
20 TABLET, FILM COATED ORAL ONCE
Status: COMPLETED | OUTPATIENT
Start: 2017-12-27 | End: 2017-12-27

## 2017-12-27 RX ORDER — ROPIVACAINE HYDROCHLORIDE 2 MG/ML
INJECTION, SOLUTION EPIDURAL; INFILTRATION; PERINEURAL AS NEEDED
Status: DISCONTINUED | OUTPATIENT
Start: 2017-12-27 | End: 2017-12-28 | Stop reason: HOSPADM

## 2017-12-27 RX ORDER — SODIUM CHLORIDE 0.9 % (FLUSH) 0.9 %
5-10 SYRINGE (ML) INJECTION EVERY 8 HOURS
Status: DISCONTINUED | OUTPATIENT
Start: 2017-12-27 | End: 2017-12-28 | Stop reason: HOSPADM

## 2017-12-27 RX ADMIN — ONDANSETRON 4 MG: 2 INJECTION INTRAMUSCULAR; INTRAVENOUS at 14:41

## 2017-12-27 RX ADMIN — ROPIVACAINE HYDROCHLORIDE 5 ML: 2 INJECTION, SOLUTION EPIDURAL; INFILTRATION; PERINEURAL at 17:02

## 2017-12-27 RX ADMIN — SODIUM CHLORIDE, SODIUM LACTATE, POTASSIUM CHLORIDE, AND CALCIUM CHLORIDE 1000 ML: 600; 310; 30; 20 INJECTION, SOLUTION INTRAVENOUS at 16:12

## 2017-12-27 RX ADMIN — OXYTOCIN 2 MILLI-UNITS/MIN: 10 INJECTION, SOLUTION INTRAMUSCULAR; INTRAVENOUS at 06:50

## 2017-12-27 RX ADMIN — ROPIVACAINE HYDROCHLORIDE 8 ML/HR: 2 INJECTION, SOLUTION EPIDURAL; INFILTRATION; PERINEURAL at 17:04

## 2017-12-27 RX ADMIN — SODIUM CHLORIDE, SODIUM LACTATE, POTASSIUM CHLORIDE, CALCIUM CHLORIDE, AND DEXTROSE MONOHYDRATE 125 ML/HR: 600; 310; 30; 20; 5 INJECTION, SOLUTION INTRAVENOUS at 22:53

## 2017-12-27 RX ADMIN — FAMOTIDINE 20 MG: 20 TABLET, FILM COATED ORAL at 11:22

## 2017-12-27 RX ADMIN — OXYTOCIN 2 MILLI-UNITS/MIN: 10 INJECTION, SOLUTION INTRAMUSCULAR; INTRAVENOUS at 20:58

## 2017-12-27 RX ADMIN — SODIUM CHLORIDE, SODIUM LACTATE, POTASSIUM CHLORIDE, CALCIUM CHLORIDE, AND DEXTROSE MONOHYDRATE 125 ML/HR: 600; 310; 30; 20; 5 INJECTION, SOLUTION INTRAVENOUS at 14:33

## 2017-12-27 RX ADMIN — LIDOCAINE HYDROCHLORIDE AND EPINEPHRINE 5 ML: 15; 5 INJECTION, SOLUTION EPIDURAL at 17:01

## 2017-12-27 RX ADMIN — BUTORPHANOL TARTRATE 1 MG: 1 INJECTION, SOLUTION INTRAMUSCULAR; INTRAVENOUS at 13:19

## 2017-12-27 NOTE — ANESTHESIA PREPROCEDURE EVALUATION
Anesthetic History   No history of anesthetic complications            Review of Systems / Medical History  Patient summary reviewed, nursing notes reviewed and pertinent labs reviewed    Pulmonary        Sleep apnea: CPAP  Smoker (quit 2 weeks)  Asthma : well controlled       Neuro/Psych              Cardiovascular    Hypertension (pih)              Exercise tolerance: >4 METS     GI/Hepatic/Renal     GERD: well controlled           Endo/Other    Diabetes: well controlled, using insulin    Morbid obesity     Other Findings   Comments: Term preg. , second day induction           Physical Exam    Airway  Mallampati: II  TM Distance: 4 - 6 cm  Neck ROM: normal range of motion   Mouth opening: Normal     Cardiovascular    Rhythm: regular           Dental  No notable dental hx       Pulmonary                 Abdominal         Other Findings            Anesthetic Plan    ASA: 3  Anesthesia type: epidural            Anesthetic plan and risks discussed with: Patient and Spouse

## 2017-12-27 NOTE — PROGRESS NOTES
Cervix unchanged, AROM, clear fluid  FHTs 140s to 150s with good accels. Continue pitocin/close surveillance. 1  2/12/17, EGA 35 wk, EFW 4 lb 13 oz.

## 2017-12-27 NOTE — PROGRESS NOTES
Chart reviewed. Patient Vitals for the past 12 hrs:   Temp Pulse Resp BP SpO2   12/27/17 1007 98.3 °F (36.8 °C) - - - -   12/27/17 1005 - 60 16 147/61 -   12/27/17 0949 - 66 18 152/70 -   12/27/17 0919 - 63 - 134/69 -   12/27/17 0853 - 61 18 146/63 -   12/27/17 0820 - 71 - 127/63 -   12/27/17 0749 - (!) 59 - 161/87 -   12/27/17 0724 - 62 - 159/85 -   12/27/17 0718 - 64 - (!) 172/93 -   12/27/17 0651 - 66 18 165/90 -   12/27/17 0623 - 64 18 163/72 -   12/27/17 0518 - 67 18 159/72 -   12/27/17 0449 - 67 - (!) 156/93 -   12/27/17 0419 - 64 18 156/80 -   12/27/17 0349 - 66 - 147/67 -   12/27/17 0318 - 76 18 155/85 -   12/27/17 0249 - 70 - (!) 164/95 -   12/27/17 0219 - 72 18 160/80 -   12/27/17 0148 - 71 - 154/82 -   12/27/17 0120 - 61 18 170/78 -   12/27/17 0050 98.5 °F (36.9 °C) 60 18 150/63 -   12/27/17 0018 - 68 - 156/77 -       Cervix:  TFT/80/-2  Membranes:  attempted AROM no fluid seen  FHTs:   Baseline 130s w/ accels. Regular contractions  Continue pitocin with  close surveillance. Pitocin @ 20 mu/min.    Anticipates a boy, \"Waylon\"

## 2017-12-27 NOTE — PROGRESS NOTES
3/C/-1, FHTs 120s w/ accels. Comfortable w/ BRYAN. Pitocin ~ 26 mu/min. Continue close surveillance.

## 2017-12-27 NOTE — PROGRESS NOTES
Brett Alamo at bedside at [phone]. OLMAN Hall at bedside at 36    Assisted pt to sitting up on bedside at 1654. Timeout completed at [phone] with MD, OLMAN and myself at bedside. Test dose given at 1702. Negative reaction. Dose given at 1705. Pt assisted to lying back in right  tilt position. See anesthesia record for details. See vital sign flow sheet for BP. Tolerated procedure well.

## 2017-12-27 NOTE — PROGRESS NOTES
Spoke with Dr Julius Peace regarding pt desire for epidural and informed MD needs to go to the OR prior to placing epidural.  Pt informed.

## 2017-12-27 NOTE — ANESTHESIA PROCEDURE NOTES
Epidural Block    Start time: 12/27/2017 4:54 PM  End time: 12/27/2017 5:03 PM  Performed by: Marybeth Klein  Authorized by: Marybeth Klein     Pre-Procedure  Indication: labor epidural    Preanesthetic Checklist: patient identified, risks and benefits discussed, anesthesia consent, patient being monitored, timeout performed and anesthesia consent    Timeout Time: 16:54        Epidural:   Patient position:  Seated  Prep region:  Lumbar  Prep: Patient draped and Chlorhexidine    Location:  L3-4    Needle and Epidural Catheter:   Needle Type:  Tuohy  Needle Gauge:  17 G  Injection Technique:  Loss of resistance using air  Attempts:  1  Catheter Size:  19 G  Events: no blood with aspiration, no cerebrospinal fluid with aspiration, no paresthesia and negative aspiration test    Test Dose:  Lidocaine 1.5% w/ epi and negative    Assessment:   Catheter Secured:  Tegaderm and tape  Insertion:  Uncomplicated  Patient tolerance:  Patient tolerated the procedure well with no immediate complications  All needles out intact, procedure tolerated well without problems

## 2017-12-27 NOTE — PROGRESS NOTES
Problem: Nutrition Deficit  Goal: *Optimize nutritional status  Nutrition:  Reason for assessment:  Length of stay day 5. Assessment:   Diet order(s): NPO  Pt currently NPO; in labor. Nutrition screen deferred at this time. Follow up past delivery.     Radha Linton, [address], [address] [place] [address], MPH  [phone]

## 2017-12-27 NOTE — PROGRESS NOTES
Spoke with Dr Billy Neal regarding SVE loose 1cm and pt complaining of pain of a 10. Orders received for epidural CRNA informed.

## 2017-12-28 PROBLEM — O14.10 SEVERE PRE-ECLAMPSIA: Status: ACTIVE | Noted: 2017-12-22

## 2017-12-28 LAB
ALBUMIN SERPL-MCNC: 2.3 G/DL (ref 3.5–5)
ALBUMIN/GLOB SERPL: 0.6 {RATIO} (ref 1.2–3.5)
ALP SERPL-CCNC: 171 U/L (ref 50–136)
ALT SERPL-CCNC: 16 U/L (ref 12–65)
ANION GAP SERPL CALC-SCNC: 13 MMOL/L (ref 7–16)
ARTERIAL PATENCY WRIST A: ABNORMAL
AST SERPL-CCNC: 16 U/L (ref 15–37)
BASE DEFICIT BLDA-SCNC: 6.8 MMOL/L (ref 0–2)
BASE DEFICIT BLDCOV-SCNC: 3.3 MMOL/L (ref 1.9–7.7)
BDY SITE: ABNORMAL
BDY SITE: NORMAL
BILIRUB SERPL-MCNC: 0.4 MG/DL (ref 0.2–1.1)
BUN SERPL-MCNC: 5 MG/DL (ref 6–23)
CALCIUM SERPL-MCNC: 9 MG/DL (ref 8.3–10.4)
CHLORIDE SERPL-SCNC: 103 MMOL/L (ref 98–107)
CO2 SERPL-SCNC: 24 MMOL/L (ref 21–32)
CREAT SERPL-MCNC: 0.53 MG/DL (ref 0.6–1)
ERYTHROCYTE [DISTWIDTH] IN BLOOD BY AUTOMATED COUNT: 12.4 % (ref 11.9–14.6)
GLOBULIN SER CALC-MCNC: 4 G/DL (ref 2.3–3.5)
GLUCOSE BLD STRIP.AUTO-MCNC: 103 MG/DL (ref 65–100)
GLUCOSE BLD STRIP.AUTO-MCNC: 118 MG/DL (ref 65–100)
GLUCOSE BLD STRIP.AUTO-MCNC: 121 MG/DL (ref 65–100)
GLUCOSE BLD STRIP.AUTO-MCNC: 125 MG/DL (ref 65–100)
GLUCOSE BLD STRIP.AUTO-MCNC: 134 MG/DL (ref 65–100)
GLUCOSE BLD STRIP.AUTO-MCNC: 159 MG/DL (ref 65–100)
GLUCOSE SERPL-MCNC: 109 MG/DL (ref 65–100)
HCO3 BLDA-SCNC: 22 MMOL/L (ref 22–26)
HCO3 BLDV-SCNC: 21 MMOL/L
HCT VFR BLD AUTO: 41.4 % (ref 35.8–46.3)
HGB BLD-MCNC: 14.2 G/DL (ref 11.7–15.4)
MCH RBC QN AUTO: 28.5 PG (ref 26.1–32.9)
MCHC RBC AUTO-ENTMCNC: 34.3 G/DL (ref 31.4–35)
MCV RBC AUTO: 83.1 FL (ref 79.6–97.8)
PCO2 BLDA: 55 MMHG (ref 35–45)
PCO2 BLDCOV: 36 MMHG (ref 14.1–43.3)
PH BLDA: 7.21 [PH] (ref 7.35–7.45)
PH BLDCOV: 7.38 [PH] (ref 7.2–7.44)
PLATELET # BLD AUTO: 263 K/UL (ref 150–450)
PMV BLD AUTO: 10.1 FL (ref 10.8–14.1)
PO2 BLDA: <36 MMHG (ref 80–105)
PO2 BLDV: 36 MMHG (ref 30.4–57.2)
POTASSIUM SERPL-SCNC: 4 MMOL/L (ref 3.5–5.1)
PROT SERPL-MCNC: 6.3 G/DL (ref 6.3–8.2)
RBC # BLD AUTO: 4.98 M/UL (ref 4.05–5.25)
SERVICE CMNT-IMP: ABNORMAL
SERVICE CMNT-IMP: NORMAL
SODIUM SERPL-SCNC: 140 MMOL/L (ref 136–145)
WBC # BLD AUTO: 22.9 K/UL (ref 4.3–11.1)

## 2017-12-28 PROCEDURE — 36415 COLL VENOUS BLD VENIPUNCTURE: CPT | Performed by: OBSTETRICS & GYNECOLOGY

## 2017-12-28 PROCEDURE — 77030011943

## 2017-12-28 PROCEDURE — 3E033VJ INTRODUCTION OF OTHER HORMONE INTO PERIPHERAL VEIN, PERCUTANEOUS APPROACH: ICD-10-PCS | Performed by: OBSTETRICS & GYNECOLOGY

## 2017-12-28 PROCEDURE — 65270000029 HC RM PRIVATE

## 2017-12-28 PROCEDURE — 82803 BLOOD GASES ANY COMBINATION: CPT

## 2017-12-28 PROCEDURE — 0UQMXZZ REPAIR VULVA, EXTERNAL APPROACH: ICD-10-PCS | Performed by: OBSTETRICS & GYNECOLOGY

## 2017-12-28 PROCEDURE — 75410000003 HC RECOV DEL/VAG/CSECN EA 0.5 HR

## 2017-12-28 PROCEDURE — 10907ZC DRAINAGE OF AMNIOTIC FLUID, THERAPEUTIC FROM PRODUCTS OF CONCEPTION, VIA NATURAL OR ARTIFICIAL OPENING: ICD-10-PCS | Performed by: OBSTETRICS & GYNECOLOGY

## 2017-12-28 PROCEDURE — 82962 GLUCOSE BLOOD TEST: CPT

## 2017-12-28 PROCEDURE — 74011250637 HC RX REV CODE- 250/637: Performed by: OBSTETRICS & GYNECOLOGY

## 2017-12-28 PROCEDURE — 80053 COMPREHEN METABOLIC PANEL: CPT | Performed by: OBSTETRICS & GYNECOLOGY

## 2017-12-28 PROCEDURE — 85027 COMPLETE CBC AUTOMATED: CPT | Performed by: OBSTETRICS & GYNECOLOGY

## 2017-12-28 RX ORDER — HYDROMORPHONE HYDROCHLORIDE 2 MG/ML
1 INJECTION, SOLUTION INTRAMUSCULAR; INTRAVENOUS; SUBCUTANEOUS
Status: DISCONTINUED | OUTPATIENT
Start: 2017-12-28 | End: 2017-12-30 | Stop reason: HOSPADM

## 2017-12-28 RX ORDER — NALOXONE HYDROCHLORIDE 0.4 MG/ML
0.4 INJECTION, SOLUTION INTRAMUSCULAR; INTRAVENOUS; SUBCUTANEOUS
Status: DISCONTINUED | OUTPATIENT
Start: 2017-12-28 | End: 2017-12-30 | Stop reason: HOSPADM

## 2017-12-28 RX ORDER — OXYTOCIN/0.9 % SODIUM CHLORIDE 15/250 ML
250 PLASTIC BAG, INJECTION (ML) INTRAVENOUS ONCE
Status: ACTIVE | OUTPATIENT
Start: 2017-12-28 | End: 2017-12-28

## 2017-12-28 RX ORDER — DIPHENHYDRAMINE HCL 25 MG
25 CAPSULE ORAL
Status: DISCONTINUED | OUTPATIENT
Start: 2017-12-28 | End: 2017-12-30 | Stop reason: HOSPADM

## 2017-12-28 RX ORDER — FAMOTIDINE 20 MG/1
20 TABLET, FILM COATED ORAL
Status: DISCONTINUED | OUTPATIENT
Start: 2017-12-28 | End: 2017-12-30 | Stop reason: HOSPADM

## 2017-12-28 RX ORDER — SIMETHICONE 80 MG
80 TABLET,CHEWABLE ORAL
Status: DISCONTINUED | OUTPATIENT
Start: 2017-12-28 | End: 2017-12-30 | Stop reason: HOSPADM

## 2017-12-28 RX ORDER — LABETALOL HYDROCHLORIDE 5 MG/ML
20 INJECTION, SOLUTION INTRAVENOUS ONCE
Status: DISPENSED | OUTPATIENT
Start: 2017-12-28 | End: 2017-12-28

## 2017-12-28 RX ORDER — METHYLERGONOVINE MALEATE 0.2 MG/ML
0.2 INJECTION INTRAVENOUS
Status: DISCONTINUED | OUTPATIENT
Start: 2017-12-28 | End: 2017-12-28

## 2017-12-28 RX ORDER — DOCUSATE SODIUM 100 MG/1
100 CAPSULE, LIQUID FILLED ORAL 2 TIMES DAILY
Status: DISCONTINUED | OUTPATIENT
Start: 2017-12-28 | End: 2017-12-30 | Stop reason: HOSPADM

## 2017-12-28 RX ORDER — ONDANSETRON 8 MG/1
8 TABLET, ORALLY DISINTEGRATING ORAL
Status: DISCONTINUED | OUTPATIENT
Start: 2017-12-28 | End: 2017-12-30 | Stop reason: HOSPADM

## 2017-12-28 RX ORDER — LORATADINE 10 MG/1
10 TABLET ORAL
Status: DISCONTINUED | OUTPATIENT
Start: 2017-12-28 | End: 2017-12-30 | Stop reason: HOSPADM

## 2017-12-28 RX ORDER — NIFEDIPINE 30 MG/1
30 TABLET, EXTENDED RELEASE ORAL 2 TIMES DAILY
Status: DISCONTINUED | OUTPATIENT
Start: 2017-12-28 | End: 2017-12-30 | Stop reason: HOSPADM

## 2017-12-28 RX ORDER — OXYCODONE AND ACETAMINOPHEN 5; 325 MG/1; MG/1
1-2 TABLET ORAL
Status: DISCONTINUED | OUTPATIENT
Start: 2017-12-28 | End: 2017-12-30 | Stop reason: HOSPADM

## 2017-12-28 RX ADMIN — NIFEDIPINE 30 MG: 30 TABLET, FILM COATED, EXTENDED RELEASE ORAL at 08:23

## 2017-12-28 RX ADMIN — NIFEDIPINE 30 MG: 30 TABLET, FILM COATED, EXTENDED RELEASE ORAL at 20:19

## 2017-12-28 RX ADMIN — WITCH HAZEL 1 PAD: 500 SOLUTION RECTAL; TOPICAL at 20:19

## 2017-12-28 RX ADMIN — DOCUSATE SODIUM 100 MG: 100 CAPSULE, LIQUID FILLED ORAL at 20:19

## 2017-12-28 NOTE — L&D DELIVERY NOTE
Delivery Summary    Patient: Andrea Velazquez MRN: [id number]  SSN: [id number]    YOB: 1985  Age: 29 y.o. Sex: female       Information for the patient's :  Krystina Balderas [[id number]]       Labor Events:    Labor: No   Rupture Date: 2017   Rupture Time: 1:41 PM   Rupture Type AROM   Amniotic Fluid Volume: Scant    Amniotic Fluid Description: Clear None   Induction: Oxytocin       Augmentation: None   Labor Events: None     Cervical Ripenin2017 6:00 PM Cervidil;Misoprostol     Delivery Events:  Episiotomy:     Laceration(s): Right periurethral;Left periurethral     Repaired:      Number of Repair Packets: 1   Suture Type and Size: Other 3-0 monocryl   Estimated Blood Loss (ml): 25ml       Delivery Date: 2017    Delivery Time: 6:31 AM  Delivery Type: Vaginal, Spontaneous Delivery  Sex:  Male     Gestational Age: 42w2d   Delivery Clinician:  Carla Jones  Living Status: Living   Delivery Location: L&D            APGARS  One minute Five minutes Ten minutes   Skin color: 0   1        Heart rate: 2   2        Grimace: 2   2        Muscle tone: 2   2        Breathin   2        Totals: 8   9            Presentation: Vertex    Position: Middle Occiput Anterior  Resuscitation Method:  Tactile Stimulation;Suctioning-bulb     Meconium Stained: Thin      Cord Vessels:        Cord Events:    Cord Blood Sent?:  Yes    Blood Gases Sent?:  Yes    Placenta:  Date/Time:   6:41 AM  Removal: Spontaneous      Appearance: Normal     [place] Measurements:  Birth Weight:        Birth Length:        Head Circumference:        Chest Circumference:       Abdominal Girth:       Other Providers:   Edgar SANCHEZ;BALAJI HERNANDEZ;JOHANNY TEJEDA;JUANITA PACHECO, Obstetrician;Primary Nurse;Scrub Tech;Charge Nurse           Group B Strep:   Lab Results   Component Value Date/Time    GrBStrep, External Negative 2017     Information for the patient's :  Hugh Grant Rivers [[id number]]   No results found for: Roland Dennis, [address]    Lab Results   Component Value Date/Time    EPHV 7.382 2017 06:21 AM    PCO2V 36 2017 06:21 AM    PO2V 36 2017 06:21 AM    HCO3V 21 2017 06:21 AM    EBDV 3.3 2017 06:21 AM    SITE CORD 2017 06:31 AM    RSCOM NA at 2017 6 53 38 AM. Not read back. 2017 06:31 AM      C/C/+2----> over an intact perineum. Loose nuchal cord x 1 reduced in the course of delivery. Nares/mouth bulb suctioned on the perineum. After delayed cord clamping the cord was doubly clamped and cut. Baby laid on mother's chest, nursing assisted Shannon Blackman with getting the baby skin to skin. 3V cord. Cord gas & cord blood obtained. Placenta spontaneous/intact. No cervical/vaginal lacs. Intrauterine manual exploration no placental remnants were obtained, clots removed. Recto-vaginal exam---->intact along full extent, no buttonhole. Bilateral superficial periurethral lacs (right deeper than left) right lac re-approximated with  3-0 monocryl interrupted stitches (left lac very superficial , no repair needed). Mom/baby stable immediately post delivery. Baby \" Rosa Mancera \".

## 2017-12-28 NOTE — PROGRESS NOTES
Dr. Billy Neal @ bedside to evaluate strip. Will continue current POC and restart pitocin increasing slowly as tolerated.

## 2017-12-28 NOTE — PROGRESS NOTES
0131 SC for 350. SVE 9/100/+2  0135. Bobby @ bedside to assess strip. Verbal orders to recheck cervix in 1 hour. Pt denies needs at this time.  Will continue to monitor

## 2017-12-28 NOTE — PROGRESS NOTES
Problem: Nutrition Deficit  Goal: *Optimize nutritional status  Nutrition  Reason for assessment:  Length of stay   Assessment:   Diet order(s): Regular  Food/Nutrition Patient History:  Pt s/p vaginal delivery this am.  Diet advanced to Regular. Pt plans on breastfeeding; endorses good po intake. No nutrition risk factors identified on nursing admission malnutrition screening tool. Anthropometrics:Height: 5' 3\" (160 cm),  Weight: 116.6 kg (257 lb),-pre delivery weight; Weight Source: Standing scale (comment), Body mass index is 45.53 kg/(m^2). Macronutrient needs:  EER:  ~ 2830 kcal /day (20 kcal/kg BW + additional 500 kcal/day r/t lactation)  EPR:  ~ 72 grams protein/day (0.9 grams/kg pre pregnant IBW + additional 25 gm/day r/t lactation)  Intake/Comparative Standards: No recorded intake; pt's spouse plans on bringing in take out for patient's supper meal.  Insufficient information to determine % estimated kcal and protein needs met at this time. Nutrition Diagnosis: No nutrition diagnosis at this time. Intervention:  Meals and snacks: Continue current diet. Encouraged patient to eat well balanced meals with adequate kcal, protein and fluids to meet the nutrient requirements during lactation.   Discharge nutrition plan: Continue current diet    Palmer Sacks, [address] [place] [address], JOSHUA, MPH  [phone]

## 2017-12-28 NOTE — PROGRESS NOTES
1935 Early variables noted in the 62s. SVE 3/100/0  1936 Rt tilt on p nut birthing ball  1939 Pitocin decreased in half to 13 milliunits per minute  Oxygen on @ 10 lpm  1941 pitocin off  1942 LR Bolus 500 ml, position change to high fowlers, maternal os sat applied. o2 @ 100 %, maternal HR @ [place] wireless monitor removed.  External US and TOCO applied  1949 SVE by Yoon Patel RN 4-5/100/0

## 2017-12-28 NOTE — PROGRESS NOTES
Dr. Monty Laws on phone and notified of recurrent variables. Pitocin off and o2 on. LR bolus infusing. Orders obtained to wait 30 min and if fetus is tolerant of labor @ that time, restart pitocin @ 2milliunit/min. And titrate by 2 every 30 min. Will continue to monitor.

## 2017-12-28 NOTE — PROGRESS NOTES
SVE ant lip/100/+1  Repositioned for discomfort. Told to use lock out bolus on her own.  Pt voiced understanding,Joon continue to monitor

## 2017-12-28 NOTE — PROGRESS NOTES
Pt up to the bathroom with RN assistance. Bobby-care completed and taught. Educated mother on using the bobby bottle. Gown changed. Pt verbalizes understanding.

## 2017-12-28 NOTE — PROGRESS NOTES
RN, Christina Felix, just checked pt, 5 cm, emptied her bladder and repositioned her. Early decel resolved. FHTs 130s w/ accels. Resume pitocin slowly. Continue close surveillance.

## 2017-12-28 NOTE — PROGRESS NOTES
0350 pt called out in pain 10/10. Anesthesia called. 0354Pt siting upright vomiting. 200 ml emesis. Maternal heart rate tracing. US adjusted. 0147 Dr Aris Kumar @ bedside to increased epidural and give sitting dose.    0408 pt rates pain 8/10

## 2017-12-28 NOTE — PROGRESS NOTES
7/C/+2, FHTs 140s with accels, occasional mild variables. Molding noted (also noted earlier this evening). Continue pitocin (@ 6 mu/min).

## 2017-12-28 NOTE — PROGRESS NOTES
CTSP due to repetitive severe bps pp. Pt denies any HA, SOB/CP, RUQ pain, scotomata. No mag during labor as we were considering her Mild PreE. Will do 1x dose IV lab 20, then Procardia 30XL BID. If bps continue to be severe will start Mag. Repeat HELLP labs as last were drawn 12/25/17.     Brown Hatch MD

## 2017-12-28 NOTE — PROGRESS NOTES
SBAR OUT Report: Mother    Verbal report given to Rosalia Gramajo RN  on this patient, who is now being transferred to MIU  for routine progression of care. The patient is not wearing a green \"Anesthesia-Duramorph\" band. Report consisted of patient's Situation, Background, Assessment and Recommendations (SBAR). [place] ID bands were compared with the identification form, and verified with the patient and receiving nurse. Information from the SBAR, Kardex, Procedure Summary, Intake/Output, MAR, Accordion, Recent Results and Med Rec Status and the [place] Report was reviewed with the receiving nurse; opportunity for questions and clarification provided.

## 2017-12-28 NOTE — PROGRESS NOTES
RN @ bedside. Pt resting on p nut birthing ball. No c/o. Denies any needs at this time. Family @ bedside for support/ Will continue to monitor.

## 2017-12-28 NOTE — ANESTHESIA POSTPROCEDURE EVALUATION
Post-Anesthesia Evaluation and Assessment    Patient: Rosie Perales MRN: [id number]  SSN: [id number]    YOB: 1985  Age: 29 y.o. Sex: female       Cardiovascular Function/Vital Signs  Visit Vitals    /70    Pulse 80    Temp 37.1 °C (98.7 °F)    Resp 18    Ht 5' 3\" (1.6 m)    Wt 116.6 kg (257 lb)    SpO2 100%    Breastfeeding Yes    BMI 45.53 kg/m2       Patient is status post epidural anesthesia for labor and vaginal delivery. Nausea/Vomiting: None    Postoperative hydration reviewed and adequate. Pain:  Pain Scale 1: Numeric (0 - 10) (12/28/17 0412)  Pain Intensity 1: 4 (12/28/17 0412)   Managed    Neurological Status:   Neuro (WDL): Within Defined Limits (12/26/17 1919)   At baseline    Mental Status and Level of Consciousness: Arousable    Pulmonary Status:   O2 Device: Room air (12/25/17 1453)   Adequate oxygenation and airway patent    Complications related to anesthesia: None    Post-anesthesia assessment completed. No concerns. The patient was satisfied with her labor epidural and denies any complications. Her lower extremities have returned to baseline neurologically.     Signed By: Johanna Chaney MD     December 28, 2017

## 2017-12-28 NOTE — PROGRESS NOTES
Procardia 30 mg PO given as ordered for elevated BP. Explained purpose of medication and possible side effects.

## 2017-12-28 NOTE — PROGRESS NOTES
Patient to semi-fowlers position, toco and ultrasound adjusted frequently. Patient resting comfortably with family at bedside. Call light within reach. Denies any needs at this time.

## 2017-12-28 NOTE — PROGRESS NOTES
Pt has been on the peanut, 6/C/+2, no h/o cervical surgery (no LEEP or CKC). FHTs  130s w/ accels, some variables vs earlys. Continue close surveillance. Pitocin at 4 mu/min.

## 2017-12-28 NOTE — PROGRESS NOTES
SBAR IN Report: Mother    Verbal report received from Joy Moeller RN on this patient, who is now being transferred from L & D for routine progression of care. The patient is not wearing a green \"Anesthesia-Duramorph\" band. Report consisted of patient's Situation, Background, Assessment and Recommendations (SBAR). [place] ID bands were compared with the identification form, and verified with the patient and transferring nurse. Information from the SBAR, Kardex, Procedure Summary, Intake/Output, MAR, Accordion, Recent Results and Med Rec Status and the [place] Report was reviewed with the transferring nurse; opportunity for questions and clarification provided.

## 2017-12-28 NOTE — PROGRESS NOTES
Dr. Yissel Mahmood at bedside, strip reviewed by MD. Jose Resendiz 6/100 per MD, see MD note. Orders received to increase pitocin 1mu/min every 15-30minutes.

## 2017-12-29 LAB
GLUCOSE BLD STRIP.AUTO-MCNC: 103 MG/DL (ref 65–100)
GLUCOSE BLD STRIP.AUTO-MCNC: 74 MG/DL (ref 65–100)

## 2017-12-29 PROCEDURE — 82962 GLUCOSE BLOOD TEST: CPT

## 2017-12-29 PROCEDURE — 65270000029 HC RM PRIVATE

## 2017-12-29 PROCEDURE — 74011250637 HC RX REV CODE- 250/637: Performed by: OBSTETRICS & GYNECOLOGY

## 2017-12-29 RX ADMIN — DOCUSATE SODIUM 100 MG: 100 CAPSULE, LIQUID FILLED ORAL at 17:23

## 2017-12-29 RX ADMIN — OXYCODONE HYDROCHLORIDE AND ACETAMINOPHEN 2 TABLET: 5; 325 TABLET ORAL at 17:23

## 2017-12-29 RX ADMIN — NIFEDIPINE 30 MG: 30 TABLET, FILM COATED, EXTENDED RELEASE ORAL at 19:58

## 2017-12-29 RX ADMIN — Medication 1 AMPULE: at 11:14

## 2017-12-29 RX ADMIN — NIFEDIPINE 30 MG: 30 TABLET, FILM COATED, EXTENDED RELEASE ORAL at 09:01

## 2017-12-29 RX ADMIN — OXYCODONE HYDROCHLORIDE AND ACETAMINOPHEN 1 TABLET: 5; 325 TABLET ORAL at 07:50

## 2017-12-29 RX ADMIN — OXYCODONE HYDROCHLORIDE AND ACETAMINOPHEN 2 TABLET: 5; 325 TABLET ORAL at 00:46

## 2017-12-29 RX ADMIN — OXYCODONE HYDROCHLORIDE AND ACETAMINOPHEN 2 TABLET: 5; 325 TABLET ORAL at 23:56

## 2017-12-29 RX ADMIN — Medication 1 AMPULE: at 19:59

## 2017-12-29 RX ADMIN — DOCUSATE SODIUM 100 MG: 100 CAPSULE, LIQUID FILLED ORAL at 09:01

## 2017-12-29 NOTE — PROGRESS NOTES
Progress Note                               Patient: Marybeth Noel MRN: [id number]  SSN: [id number]    YOB: 1985  Age: 29 y.o. Sex: female      Postpartum Day Number 1 induction at 37 wks due to severe preeclampsia    Subjective:     Patient doing well postpartum without significant complaints. Voiding without difficulty. Patient reports normal lochia. . Breastfeeding: Yes     Objective:     Patient Vitals for the past 18 hrs:   Temp Pulse Resp BP   17 1515 97.7 °F (36.5 °C) 87 18 154/71   17 1055 97.7 °F (36.5 °C) 90 18 142/70   17 0901 - 79 - 141/77   17 0735 97.4 °F (36.3 °C) 76 18 156/77   17 0628 97.8 °F (36.6 °C) 74 18 153/78   17 0245 97.5 °F (36.4 °C) 78 18 149/75   17 2230 97.6 °F (36.4 °C) 90 18 136/72        Temp (24hrs), Av.8 °F (36.6 °C), Min:97.4 °F (36.3 °C), Max:98.6 °F (37 °C)      Date 17 - 17 0659 17 07 - 17 0659   Shift 2346-3950 0075-7978 24 Hour Total 6440-7536 3375-0679 24 Hour Total   I  N  T  A  K  E   I.V.  (mL/kg/hr) 1500  (1.1)  1500  (0.5)         Volume (dextrose 5% lactated ringers infusion) 1000  1000         PitOCIN Volume (oxytocin (PITOCIN) 30 units/500 ml LR) 500  500       Shift Total  (mL/kg) 1500  (12.9)  1500  (12.9)      O  U  T  P  U  T   Urine  (mL/kg/hr) 1200  (0.9)  1200  (0.4)         Urine Voided 1200  1200       Shift Total  (mL/kg) 1200  (10.3)  1200  (10.3)        300      Weight (kg) 116.6 116.6 116.6 116.6 116.6 116.6       Physical Exam:    General:   Patient without distress. Abdomen: Soft, fundus firm at level of umbilicus, nontender   Lower Extremities: Negative for swelling, cords; 2+ edema; no clonus       Lab/Data Review:  CBC:    Recent Labs      17   0829   WBC  22.9*   HGB  14.2   HCT  41.4   PLT  263       Assessment and Plan:     Really wants to go home tomorrow. BP still slightly elevated but much improved on procardia 30xl bid.   Should be able to go home as long as they remain stable. Discontinue blood sugar checks and plan 6 wks 75 gm glucola.      Signed By: Vee Posadas MD     December 29, 2017

## 2017-12-29 NOTE — PROGRESS NOTES
Report received from Trinity Greco RN. Patient care assumed-bedside reporting completed. Pt ambulating in room. Requests pain medication when available.

## 2017-12-29 NOTE — PROGRESS NOTES
Chart reviewed due to first time parent - history of anxiety (self reported).  met with family and provided education on [facility] Postpartum  Home Visit Program.  Family declined referral for home visit. Patient was tearful upon  entering the room. Patient states that she's \"exhaused\" due to being in the hospital since  as well as laboring for 1-2 days. Patient denies any depression/anxiety during pregnancy. Patient/ state that they have a very strong support system of family/friends.  provided education and literature on support available thru [employer] ([employer]). PSI Warmline:  1-806-243-4PPD (1836). WWW. POSTPARTUM. NET    Family was informed of signs/symptoms, forms of intervention (medication, counseling, education), and resources (local coordinators available telephonically, monthly support group in [place], weekly \"chat with expert\" phone sessions). Additionally, patient was provided with [facility] Checklist.\"      Discussed importance of self-care and accepting help when offered. Family was encouraged to contact me with any questions/needs -  contact information provided.       Victoria Singh, [address] [facility]

## 2017-12-29 NOTE — PROGRESS NOTES
Pt given scheduled Colace PO. Percocet 2 tab PO given to pt per request.  Educated pt to call out if pain medication does not help.

## 2017-12-29 NOTE — LACTATION NOTE
This note was copied from a baby's chart. Mother requested infant's blood sugar to be checked since his lip was quivering. Blood sugar 47. Mom requests to supplement at this time since she is not been able to pump much and infant not latching. Supplement consent signed and infant started on Good Start.

## 2017-12-29 NOTE — PROGRESS NOTES
Dr. Justino Gutiérrez notified of patient's post prandial blood sugar of 159. No new orders received for insulin. BP's reviewed with physician and new orders received to space the vital signs to Q4H.

## 2017-12-29 NOTE — PROGRESS NOTES
Patient resting in bed with no complaints at this time. Encouraged to call with needs or concerns and reminded to call for blood sugar check after lunch. Patient verbalizes understanding.

## 2017-12-29 NOTE — LACTATION NOTE

## 2017-12-29 NOTE — LACTATION NOTE
This note was copied from a baby's chart. In to check on feedings. Baby not latching, only a few sucks at each attempt. Mom pumping but not consistently, baby supplementing via bottle with no issues. Mom asked for lactation assistance. Baby awake and fussy. Mom has good technique, everted nipples. Baby would open mouth and latch for 1-2 sucks but then come off nipple and need to be relatched. Repeated attempts but only latched again for 1-2 sucks, NO consistent latching. Baby sleepy very quickly. Reassured mom that baby needs more time to learn to latch well, late  expectations reviewed. Dad able to bottle feed infant with no assistance, baby took 25ml well, burped well. Parents had been using same bottle for repeated feeds, reviewed safe formula practices. Assisted mom to pump. Had not pumped in almost 6 hours. Obtained drops from each breast, collected in bottle and will give at next feed with formula. Encouraged mom to pump more diligently for milk supply. Mom with several risk factors for low milk supply so very important for diligent pumping. Continue with latch attempts. Continue to supplement formula.

## 2017-12-30 VITALS
OXYGEN SATURATION: 100 % | HEART RATE: 89 BPM | WEIGHT: 257 LBS | SYSTOLIC BLOOD PRESSURE: 153 MMHG | TEMPERATURE: 98.7 F | DIASTOLIC BLOOD PRESSURE: 80 MMHG | RESPIRATION RATE: 18 BRPM | BODY MASS INDEX: 45.54 KG/M2 | HEIGHT: 63 IN

## 2017-12-30 PROCEDURE — 90707 MMR VACCINE SC: CPT | Performed by: OBSTETRICS & GYNECOLOGY

## 2017-12-30 PROCEDURE — 74011250637 HC RX REV CODE- 250/637: Performed by: OBSTETRICS & GYNECOLOGY

## 2017-12-30 PROCEDURE — 90715 TDAP VACCINE 7 YRS/> IM: CPT | Performed by: OBSTETRICS & GYNECOLOGY

## 2017-12-30 PROCEDURE — 74011250636 HC RX REV CODE- 250/636: Performed by: OBSTETRICS & GYNECOLOGY

## 2017-12-30 RX ORDER — NIFEDIPINE 30 MG/1
30 TABLET, EXTENDED RELEASE ORAL 2 TIMES DAILY
Qty: 60 TAB | Refills: 0 | Status: SHIPPED | OUTPATIENT
Start: 2017-12-30 | End: 2018-10-25 | Stop reason: SDDI

## 2017-12-30 RX ORDER — OXYCODONE AND ACETAMINOPHEN 5; 325 MG/1; MG/1
1 TABLET ORAL
Qty: 20 TAB | Refills: 0 | Status: SHIPPED | OUTPATIENT
Start: 2017-12-30 | End: 2018-02-05

## 2017-12-30 RX ADMIN — TETANUS TOXOID, REDUCED DIPHTHERIA TOXOID AND ACELLULAR PERTUSSIS VACCINE, ADSORBED 0.5 ML: 5; 2.5; 8; 8; 2.5 SUSPENSION INTRAMUSCULAR at 08:49

## 2017-12-30 RX ADMIN — MEASLES, MUMPS, AND RUBELLA VIRUS VACCINE LIVE 0.5 ML: 1000; 12500; 1000 INJECTION, POWDER, LYOPHILIZED, FOR SUSPENSION SUBCUTANEOUS at 08:51

## 2017-12-30 RX ADMIN — OXYCODONE HYDROCHLORIDE AND ACETAMINOPHEN 1 TABLET: 5; 325 TABLET ORAL at 05:31

## 2017-12-30 RX ADMIN — DOCUSATE SODIUM 100 MG: 100 CAPSULE, LIQUID FILLED ORAL at 08:49

## 2017-12-30 RX ADMIN — NIFEDIPINE 30 MG: 30 TABLET, FILM COATED, EXTENDED RELEASE ORAL at 08:48

## 2017-12-30 RX ADMIN — Medication 1 AMPULE: at 08:46

## 2017-12-30 NOTE — PROGRESS NOTES
Post-Partum Day Number 2 Progress/Discharge Note    Patient doing well post-partum without significant complaint. Voiding without difficulty, normal lochia. No sx of preE. Vitals:  Patient Vitals for the past 8 hrs:   BP Temp Pulse Resp   17 0848 132/76 98.1 °F (36.7 °C) 78 18   17 0740 141/79 98.2 °F (36.8 °C) 82 18   17 0306 140/71 98 °F (36.7 °C) 79 18     Temp (24hrs), Av °F (36.7 °C), Min:97.7 °F (36.5 °C), Max:98.4 °F (36.9 °C)      Vital signs stable, afebrile. Exam:  Patient without distress. Abdomen soft, fundus firm at level of umbilicus, non tender               Lower extremities are negative for swelling, cords or tenderness. Lab/Data Review: All lab results for the last 24 hours reviewed. Assessment and Plan:  Patient appears to be having uncomplicated post-partum course. Continue routine perineal care and maternal education. Plan discharge for today with follow up in our office in 1-2 weeks. Will continue her procardia at home and reeval at her BP check.

## 2017-12-30 NOTE — DISCHARGE SUMMARY
Obstetrical Discharge Summary     Name: Fuad Brock MRN: [id number]  SSN: [id number]    YOB: 1985  Age: 29 y.o. Sex: female      Admit Date: 2017    Discharge Date: 2017     Admitting Physician: Iglesia Gonzalez MD     Attending Physician:  Iglesia Gonzalez MD     * Admission Diagnoses: triage;Pregnancy induced hypertension, antepartum;Mild pre-*    * Discharge Diagnoses:   Information for the patient's :  Umesh Parsons [[id number]]   Delivery of a 5 lb 10.1 oz (2.555 kg) male infant via Vaginal, Spontaneous Delivery on 2017 at 6:31 AM  by . Apgars were 8 and 9. Additional Diagnoses:   Hospital Problems as of 2017  Date Reviewed: 2017          Codes Class Noted - Resolved POA    * (Principal)Severe pre-eclampsia ICD-10-CM: O14.10  ICD-9-CM: 642.50  2017 - Present Yes    Overview Signed 2017  9:44 AM by Iglesia Gonzalez MD     IOL at 37wks w/ PreE w/OUT severe features; developed several severe bps on L&D and recurrent severe bps pp; no Mag in labor--will hold for now. Start Procardia 30 XL BID. Supervision of high risk pregnancy in third trimester ICD-10-CM: O09.93  ICD-9-CM: V23.9  2017 - Present Yes        Pre-existing type 2 diabetes mellitus in pregnancy in third trimester ICD-10-CM: O24.113  ICD-9-CM: 648.03, 250.00  2017 - Present Yes    Overview Addendum 2017  1:42 PM by Faiza Infante MD     2017 at [facility]:  Appropriate fetal growth, reassuring fetal status. AC 17%, overall 22%, ED 17.8 cm, UA Dopplers WNL, BPP . Glucose log reviewed. Ranges from 77 to 164. Several PP's elevated. Reviewed diet and suggested increase in protein. Loosen PP parameters <140. Current Dose is now Levemir 16/16 units and Humalog . · Follow up at [facility] in 3 weeks for fetal growth and BPP and MD/DM. · Continue twice weekly fetal testing at 34 weeks in primary OB office.   · Continue QID BG testing and send logs weekly to OB and OUR office. · Continue Levemir; increase Humalog with meals. Adjust insulin prn for elevations. · Fetal kick counts stressed. · Plan induction at 39 weeks. 2017 MFM Inpt- admission for preE rule out  Lantus 16 BID; Humalog 26 w meals, SSI prn  If remains pregnant, back insulin to humalog to 20units w meals on . Tobacco smoking complicating pregnancy, third trimester ICD-10-CM: O99.333  ICD-9-CM: 649.03  2017 - Present Yes    Overview Addendum 2017  9:03 AM by Nickolas Bowen RN     2017 at [facility]: 1-2 cigarettes per day. 10/10/2017 at [facility]:  1-2 cigarettes per day. 2017 at [facility]:  Smoking 2-3 cigarettes/day. 2017 at [facility]:  Decreased to 1 cigarette every other day. · Patient counselled regarding dangers to her and fetus from tobacco use including IUGR, Abruption and PPROM and PTD. · She will f/u with you regarding options to help with cessation. Wellbutrin and Nicotine patches are pregnancy-safe options if unable to stop smoking. Recommend avoidance of e-cigarettes/vaping due to concerns of impact on placental function. Pregnancy w/ hx of uterine myomectomy ICD-10-CM: O34.29  ICD-9-CM: 654.90  2017 - Present Yes    Overview Addendum 10/10/2017  1:07 PM by Daryl Landis MD     10/10/2017 at [facility]:  Patient with Hx of Hysteroscopy, D&C, Myomectomy with Myosure. · Unlikely to have impacted myometrium to the point that c/s is required in the early term period. Obesity affecting pregnancy in third trimester ICD-10-CM: O99.213  ICD-9-CM: 649.13  2017 - Present Yes    Overview Addendum 2017 12:22 AM by Km Rangel MD     32yo   Obese, PCOS - on metformin, stopped after 1st trimester, early GTT pending at 16 wks -failed 1hr, failed 3 hr. Start checking sugars & refer to [facility] and MFM.    has child from previous marriage that lives with them  On baby ASA and vit D, calcium - stop baby ASA at 36 wks  Incomplete anatomy US - due to position and BMI. Face, heart, nose/lips, diaphragm - all incomplete. BL small CPCs - discussed. Repeat at 24 wks unless done at [facility] close to. Uterine fibroids affecting pregnancy, third trimester ICD-10-CM: O34.13, D25.9  ICD-9-CM: 654.13, 218.9  2017 - Present Yes    Overview Addendum 2017 11:38 PM by Emmett Romo MD     Pt s/p \"myomectomy\" by Dr. Zeina Richard - performed by hysteroscopic resection (Myosure) and pathology consistent with benign endomyometrial tissue. Fibroid remained the same on comparison of pre and post procedure ultrasounds. Very unlikely that pt had a deep resection and should not require  delivery due to this procedure. Polycystic ovary complicating pregnancy, antepartum ICD-10-CM: O99.89, E28.2  ICD-9-CM: 646.83, 256.4  2014 - Present Yes        RESOLVED: Mild pre-eclampsia ICD-10-CM: O14.00  ICD-9-CM: 642.40  2017 - 2017 Unknown             Lab Results   Component Value Date/Time    ABO/Rh(D) A POSITIVE 2017 07:39 PM    Rubella, External Non Immune 2017    GrBStrep, External Negative 2017    ABO,Rh A Positive 2017      Immunization History   Administered Date(s) Administered    Influenza Vaccine (Quadrivalent) 2017    MMR 2017    Tdap 2017       * Procedures: , repair of BL periurethral lacerations  * No surgery found *           * Discharge Condition: good    * Hospital Course: Normal hospital course following the delivery. * Disposition: Home    Discharge Medications:   Current Discharge Medication List      START taking these medications    Details   NIFEdipine ER (PROCARDIA XL) 30 mg ER tablet Take 1 Tab by mouth two (2) times a day. Qty: 60 Tab, Refills: 0      oxyCODONE-acetaminophen (PERCOCET) 5-325 mg per tablet Take 1 Tab by mouth every six (6) hours as needed. Max Daily Amount: 4 Tabs.   Qty: 20 Tab, Refills: 0    Associated Diagnoses: Obstetric vaginal laceration, delivered, current hospitalization         CONTINUE these medications which have NOT CHANGED    Details   loratadine (CLARITIN) 10 mg tablet Take 10 mg by mouth. PNV no.24-iron-folic acid-dha (PRENATAL DHA+COMPLETE PRENATAL) -300 mg-mcg-mg cmpk Take  by mouth. ranitidine (ZANTAC) 150 mg tablet Take 150 mg by mouth as needed. * Follow-up Care/Patient Instructions: Activity: No sex for 6 weeks, No driving while on narcotic analgesics and No heavy lifting for 2 weeks  Diet: Regular Diet  Wound Care: None needed    Follow-up Information     Follow up With Details Comments Contact Info    None   None ([phone]) Patient stated that they have no PCP         1wk BP check with Dr. Kaylyn Orantes, or Dr. Ricco Patel if pt prefers specific time on either MD schedule.     Signed By:  Toi Nagel MD     December 30, 2017

## 2017-12-30 NOTE — PROGRESS NOTES
Morning assessment complete. IV removed due to phelbitis above site. Pt states, \"I popped a stitch straining last night trying to use the bathroom. \" Perineum intact upon assessment.

## 2017-12-30 NOTE — LACTATION NOTE
In to follow up with mom and infant prior to discharge to home. Mom stated that she is still offering infant the breast and that infant is latching and sucking \"more\" at the breast. She then pumps, feeds infant her expressed colostrum and then she follow that with formula. Reviewed discharge information as well as gave mom a feeding plan and reviewed that with her.mom and infant are following up with [facility] and will see lactation consultant there.

## 2017-12-30 NOTE — PROGRESS NOTES
Bedside report completed with Crystal Willingham. Plan of care reviewed with patient, verbalized understanding. Care assumed.

## 2017-12-30 NOTE — DISCHARGE INSTRUCTIONS
Discharge instruction to follow: Activity: Pelvis rest for 6 weeks     No heavy lifting over 15 lbs for 2 weeks     No driving for 2 weeks     No push/pull motion such as sweeping or vacuuming for 2 weeks     No tub baths for 6 weeks    Continue to use bobby-bottle with every void or bowel movement until comfortable stopping. Change sanitary pad after each urination or bowel movement. Call MD for the following:      Fever over 101 F; pain not relieved by medication; foul smelling vaginal discharge or increase in vaginal bleeding. Redness, swelling, or drainage from  incision. Take medication as prescribed. Follow up with MD as order. Your  at Home: Care Instructions  Your Care Instructions  During your baby's first few weeks, you will spend most of your time feeding, diapering, and comforting your baby. You may feel overwhelmed at times. It is normal to wonder if you know what you are doing, especially if you are first-time parents. [place] care gets easier with every day. Soon you will know what each cry means and be able to figure out what your baby needs and wants. Follow-up care is a key part of your child's treatment and safety. Be sure to make and go to all appointments, and call your doctor if your child is having problems. It's also a good idea to know your child's test results and keep a list of the medicines your child takes. How can you care for your child at home? Feeding  · Feed your baby on demand. This means that you should breastfeed or bottle-feed your baby whenever he or she seems hungry. Do not set a schedule. · During the first 2 weeks,  babies need to be fed every 1 to 3 hours (10 to 12 times in 24 hours) or whenever the baby is hungry. Formula-fed babies may need fewer feedings, about 6 to 10 every 24 hours. · These early feedings often are short. Sometimes, a  nurses or drinks from a bottle only for a few minutes.  Feedings gradually will last longer. · You may have to wake your sleepy baby to feed in the first few days after birth. Sleeping  · Always put your baby to sleep on his or her back, not the stomach. This lowers the risk of sudden infant death syndrome (SIDS). · Most babies sleep for a total of 18 hours each day. They wake for a short time at least every 2 to 3 hours. · Newborns have some moments of active sleep. The baby may make sounds or seem restless. This happens about every 50 to 60 minutes and usually lasts a few minutes. · At first, your baby may sleep through loud noises. Later, noises may wake your baby. · When your  wakes up, he or she usually will be hungry and will need to be fed. Diaper changing and bowel habits  · Try to check your baby's diaper at least every 2 hours. If it needs to be changed, do it as soon as you can. That will help prevent diaper rash. · Your 's wet and soiled diapers can give you clues about your baby's health. Babies can become dehydrated if they're not getting enough breast milk or formula or if they lose fluid because of diarrhea, vomiting, or a fever. · For the first few days, your baby may have about 3 wet diapers a day. After that, expect 6 or more wet diapers a day throughout the first month of life. It can be hard to tell when a diaper is wet if you use disposable diapers. If you cannot tell, put a piece of tissue in the diaper. It will be wet when your baby urinates. · Keep track of what bowel habits are normal or usual for your child. Umbilical cord care  · Gently clean your baby's umbilical cord stump and the skin around it at least one time a day. You also can clean it during diaper changes. · Gently pat dry the area with a soft cloth. You can help your baby's umbilical cord stump fall off and heal faster by keeping it dry between cleanings. · The stump should fall off within a week or two.  After the stump falls off, keep cleaning around the belly button at least one time a day until it has healed. When should you call for help? Call your baby's doctor now or seek immediate medical care if:  ? · Your baby has a rectal temperature that is less than 97.8°F or is 100.4°F or higher. Call if you cannot take your baby's temperature but he or she seems hot. ? · Your baby has no wet diapers for 6 hours. ? · Your baby's skin or whites of the eyes gets a brighter or deeper yellow. ? · You see pus or red skin on or around the umbilical cord stump. These are signs of infection. ? Watch closely for changes in your child's health, and be sure to contact your doctor if:  ? · Your baby is not having regular bowel movements based on his or her age. ? · Your baby cries in an unusual way or for an unusual length of time. ? · Your baby is rarely awake and does not wake up for feedings, is very fussy, seems too tired to eat, or is not interested in eating. Where can you learn more? Go to http://miquel-surinder.info/. Enter C995 in the search box to learn more about \"Your [place] at Home: Care Instructions. \"  Current as of: May 12, 2017  Content Version: 11.4  © 3348-1778 [employer]. Care instructions adapted under license by [employer] (which disclaims liability or warranty for this information). If you have questions about a medical condition or this instruction, always ask your healthcare professional. [place] [address] any warranty or liability for your use of this information. After Your Delivery (the Postpartum Period): Care Instructions  Your Care Instructions    Congratulations on the birth of your baby. Like pregnancy, the  period can be a time of excitement, vero, and exhaustion. You may look at your wondrous little baby and feel happy. You may also be overwhelmed by your new sleep hours and new responsibilities. At first, babies often sleep during the days and are awake at night.  They do not have a pattern or routine. They may make sudden gasps, jerk themselves awake, or look like they have crossed eyes. These are all normal, and they may even make you smile. In these first weeks after delivery, try to take good care of yourself. It may take 4 to 6 weeks to feel like yourself again, and possibly longer if you had a  birth. You will likely feel very tired for several weeks. Your days will be full of ups and downs, but lots of vero as well. Follow-up care is a key part of your treatment and safety. Be sure to make and go to all appointments, and call your doctor if you are having problems. It's also a good idea to know your test results and keep a list of the medicines you take. How can you care for yourself at home? Take care of your body after delivery  · Use pads instead of tampons for the bloody flow that may last as long as 2 weeks. · Ease cramps with ibuprofen (Advil, Motrin). · Ease soreness of hemorrhoids and the area between your vagina and rectum with ice compresses or witch hazel pads. · Ease constipation by drinking lots of fluid and eating high-fiber foods. Ask your doctor about over-the-counter stool softeners. · Cleanse yourself with a gentle squeeze of warm water from a bottle instead of wiping with toilet paper. · Take a sitz bath in warm water several times a day. · Wear a good nursing bra. Ease sore and swollen breasts with warm, wet washcloths. · If you are not breastfeeding, use ice rather than heat for breast soreness. · Your period may not start for several months if you are breastfeeding. You may bleed more, and longer at first, than you did before you got pregnant. · Wait until you are healed (about 4 to 6 weeks) before you have sexual intercourse. Your doctor will tell you when it is okay to have sex. · Try not to travel with your baby for 5 or 6 weeks. If you take a long car trip, make frequent stops to walk around and stretch. Avoid exhaustion  · Rest every day. Try to nap when your baby naps. · Ask another adult to be with you for a few days after delivery. · Plan for  if you have other children. · Stay flexible so you can eat at odd hours and sleep when you need to. Both you and your baby are making new schedules. · Plan small trips to get out of the house. Change can make you feel less tired. · Ask for help with housework, cooking, and shopping. Remind yourself that your job is to care for your baby. Know about help for postpartum depression  · \"Baby blues\" are common for the first 1 to 2 weeks after birth. You may cry or feel sad or irritable for no reason. · Rest whenever you can. Being tired makes it harder to handle your emotions. · Go for walks with your baby. · Talk to your partner, friends, and family about your feelings. · If your symptoms last for more than a few weeks, or if you feel very depressed, ask your doctor for help. · Postpartum depression can be treated. Support groups and counseling can help. Sometimes medicine can also help. Stay healthy  · Eat healthy foods so you have more energy, make good breast milk, and lose extra baby pounds. · If you breastfeed, avoid alcohol and drugs. Stay smoke-free. If you quit during pregnancy, congratulations. · Start daily exercise after 4 to 6 weeks, but rest when you feel tired. · Learn exercises to tone your belly. Do Kegel exercises to regain strength in your pelvic muscles. You can do these exercises while you stand or sit. ¨ Squeeze the same muscles you would use to stop your urine. Your belly and thighs should not move. ¨ Hold the squeeze for 3 seconds, and then relax for 3 seconds. ¨ Start with 3 seconds. Then add 1 second each week until you are able to squeeze for 10 seconds. ¨ Repeat the exercise 10 to 15 times for each session. Do three or more sessions each day. · Find a class for new mothers and new babies that has an exercise time.   · If you had a  birth, give yourself a bit more time before you exercise, and be careful. When should you call for help? Call 911 anytime you think you may need emergency care. For example, call if:  ? · You passed out (lost consciousness). ?Call your doctor now or seek immediate medical care if:  ? · You have severe vaginal bleeding. This means you are passing blood clots and soaking through a pad each hour for 2 or more hours. ? · You are dizzy or lightheaded, or you feel like you may faint. ? · You have a fever. ? · You have new belly pain, or your pain gets worse. ? Watch closely for changes in your health, and be sure to contact your doctor if:  ? · Your vaginal bleeding seems to be getting heavier. ? · You have new or worse vaginal discharge. ? · You feel sad, anxious, or hopeless for more than a few days. ? · You do not get better as expected. Where can you learn more? Go to http://miquel-surinder.info/. Enter A461 in the search box to learn more about \"After Your Delivery (the Postpartum Period): Care Instructions. \"  Current as of: March 16, 2017  Content Version: 11.4  © 1557-5825 [employer], [employer] [place]. Care instructions adapted under license by [email or web address] (which disclaims liability or warranty for this information). If you have questions about a medical condition or this instruction, always ask your healthcare professional. [place] [address] any warranty or liability for your use of this information.

## 2017-12-30 NOTE — LACTATION NOTE
Individualized Feeding Plan for Breastfeeding   Lactation Services [phone]  As much as possible, hold your baby on your chest so babys bare skin is against your bare skin with a blanket covering babys back, especially 30 minutes before it is time for baby to eat. Watch for early feeding cues such as, licking lips, sucking motions, rooting, hands to mouth. Crying is a late feeding cue. Feed your baby at least 8 times in 24 hours, or more if your baby is showing feeding cues. If baby is sleepy put baby skin to skin and watch for hunger cues. To rouse baby: unwrap, undress, massage hands, feet, & back, change diaper, gently change babys position from lying to sitting. 15-20 minutes on the first breast of active breastfeeding is considered a good feeding. Good, active breastfeeding is when baby is alert, tugging the nipple, their ear may move, and you can hear swallows. Allow baby to finish the first side before changing sides. Sleeping at the breast or only brief, light sucks should not be considered a good, full breastfeed. At each feeding:  __x__1. Do Suck Practice on finger before each feeding until sucking pattern is smooth. Try using index finger. Nail down towards tongue. __x__2. Hand Express for a few minutes prior to latching to help start milk flow. __x__3. Baby needs to NURSE WELL x 15-20 minutes on at least first breast, burp and offer 2nd breast at every feeding. If no sustained latch only attempt at breast for 10 minutes. If baby does not latch on and feed well on at least one side, you should:   __x__4. Double pump for 15 minutes with breast massage and compression. Hand express for an additional 2-3 minutes per side. Pump after each feeding attempt or poor feeding, up to 8 times per day. If you are not putting baby to the breast you need to pump 8 times a day. Pump every at least every 3 hours. __x__5.  Give baby all of the breast milk you obtain using a straight syringe or  curved syringe. If baby does NOT have enough wet and dirty diapers per day, is jaundiced/lethargic, or has significant weight loss AND you do NOT pump enough milk for each feeding (per volume listed below), formula supplementation may need to be used. Call lactation department /pediatrician if you have concerns. AVERAGE INTAKES OF COLOSTRUM BY HEALTHY  INFANTS:  Time  Day Intake (ml/feed)  Based on 8 feedings per day. 24-48 hrs  2 5-15 ml  48-72 hrs  3 15-30 ml (0.5-1 oz) Based on every 3 hour feedings  72-96 hrs  4 30-45 ml (1-1.5oz)                          5-6      45-60 ml (1.5-2oz)                           7        60-75 ml (2-2.5oz)  By day 7, baby will need ml or 2 oz at each feeding based on 8 feedings per day & babys weight. (1oz = 30ml). Total milk volume needed in 24 hours by Day 7 is 15 oz per day based on baby's birthweight of 5lbs 10 ozs. Use feeding plan until follow up with pediatrician. Continue to attempt at the breast for most feeds. Pump every 3 hours if no latch. Give all pumped colostrum/breastmilk at each feeding. Mom and infant are following up with [facility] and will see lactation consultant there. Outpatient services are located on the 4th floor at [facility]. Check in at the 4th floor registration desk (the same one you used when you came to have your baby). Call for questions [phone]     Breastfeeding Support Group: Meets most months in [address] in [address]. Days and times may vary. Please call [phone] or visit our website [email or web address] for the most current information. Support Group is free, but please register that you plan to attend.

## 2017-12-30 NOTE — PROGRESS NOTES
Discharge teaching complete, prescriptions given and papers signed. Patient verbalizes understanding and will call when ready for discharge and in any further needs arise.

## 2018-01-05 PROBLEM — E66.01 OBESITY, MORBID (HCC): Status: ACTIVE | Noted: 2018-01-05

## 2018-01-24 ENCOUNTER — TELEPHONE (OUTPATIENT)
Dept: CASE MANAGEMENT | Age: 33
End: 2018-01-24

## 2018-01-24 NOTE — TELEPHONE ENCOUNTER
Phone call to patient at [phone]. No answer; message left.     Yi Soriano, [address] [facility] [address]

## 2018-03-25 PROBLEM — E66.01 OBESITY, CLASS III, BMI 40-49.9 (MORBID OBESITY) (HCC): Status: ACTIVE | Noted: 2018-03-25

## 2018-03-25 PROBLEM — E28.2 PCOS (POLYCYSTIC OVARIAN SYNDROME): Status: ACTIVE | Noted: 2018-03-25

## 2018-04-16 ENCOUNTER — TELEPHONE (OUTPATIENT)
Dept: CASE MANAGEMENT | Age: 33
End: 2018-04-16

## 2018-07-10 ENCOUNTER — TELEPHONE (OUTPATIENT)
Dept: CASE MANAGEMENT | Age: 33
End: 2018-07-10

## 2018-07-10 NOTE — TELEPHONE ENCOUNTER
Phone call to patient at [phone]. [facility] answer; message left.  [address], [address] [facility]

## 2021-08-03 PROBLEM — E66.01 OBESITY, MORBID (HCC): Status: RESOLVED | Noted: 2018-01-05 | Resolved: 2021-08-03

## 2021-10-08 ENCOUNTER — HOSPITAL ENCOUNTER (OUTPATIENT)
Age: 36
Discharge: HOME | End: 2021-10-08
Attending: OBSTETRICS & GYNECOLOGY | Admitting: OBSTETRICS & GYNECOLOGY
Payer: COMMERCIAL

## 2021-10-08 VITALS
RESPIRATION RATE: 18 BRPM | BODY MASS INDEX: 44.65 KG/M2 | HEART RATE: 75 BPM | SYSTOLIC BLOOD PRESSURE: 138 MMHG | DIASTOLIC BLOOD PRESSURE: 64 MMHG | OXYGEN SATURATION: 97 % | TEMPERATURE: 98.7 F | WEIGHT: 252 LBS | HEIGHT: 63 IN

## 2021-10-08 PROBLEM — U07.1 COVID-19 AFFECTING PREGNANCY IN FIRST TRIMESTER: Status: ACTIVE | Noted: 2021-10-08

## 2021-10-08 PROBLEM — O98.511 COVID-19 AFFECTING PREGNANCY IN FIRST TRIMESTER: Status: ACTIVE | Noted: 2021-10-08

## 2021-10-08 PROCEDURE — 99283 EMERGENCY DEPT VISIT LOW MDM: CPT

## 2021-10-08 PROCEDURE — 74011000258 HC RX REV CODE- 258: Performed by: OBSTETRICS & GYNECOLOGY

## 2021-10-08 PROCEDURE — 74011000636 HC RX REV CODE- 636: Performed by: OBSTETRICS & GYNECOLOGY

## 2021-10-08 PROCEDURE — 2709999900 HC NON-CHARGEABLE SUPPLY

## 2021-10-08 PROCEDURE — 96368 THER/DIAG CONCURRENT INF: CPT

## 2021-10-08 PROCEDURE — 96365 THER/PROPH/DIAG IV INF INIT: CPT

## 2021-10-08 RX ADMIN — CASIRIVIMAB AND IMDEVIMAB: 600; 600 INJECTION, SOLUTION, CONCENTRATE INTRAVENOUS at 18:39

## 2021-10-08 NOTE — PROGRESS NOTES
Hospitalist Dr Nunez called to alert that pt is here. 9 weeks and 2 days. Defer efm and toco for now.

## 2021-10-08 NOTE — DISCHARGE INSTRUCTIONS
Patient Education        Viral Infections: Care Instructions  Your Care Instructions     You don't feel well, but it's not clear what's causing it. You may have a viral infection. Viruses cause many illnesses, such as the common cold, influenza, fever, rashes, and the diarrhea, nausea, and vomiting that are often called \"stomach flu. \" You may wonder if antibiotic medicines could make you feel better. But antibiotics only treat infections caused by bacteria. They don't work on viruses. The good news is that viral infections usually aren't serious. Most will go away in a few days without medical treatment. In the meantime, there are a few things you can do to make yourself more comfortable. Follow-up care is a key part of your treatment and safety. Be sure to make and go to all appointments, and call your doctor if you are having problems. It's also a good idea to know your test results and keep a list of the medicines you take. How can you care for yourself at home? · Get plenty of rest if you feel tired. · Take an over-the-counter pain medicine if needed, such as acetaminophen (Tylenol), ibuprofen (Advil, Motrin), or naproxen (Aleve). Read and follow all instructions on the label. · Be careful when taking over-the-counter cold or flu medicines and Tylenol at the same time. Many of these medicines have acetaminophen, which is Tylenol. Read the labels to make sure that you are not taking more than the recommended dose. Too much acetaminophen (Tylenol) can be harmful. · Drink plenty of fluids. If you have kidney, heart, or liver disease and have to limit fluids, talk with your doctor before you increase the amount of fluids you drink. · Stay home from work, school, and other public places while you have a fever. When should you call for help? Call 911 anytime you think you may need emergency care. For example, call if:    · You have severe trouble breathing.     · You passed out (lost consciousness).    Call your doctor now or seek immediate medical care if:    · You seem to be getting much sicker.     · You have a new or higher fever.     · You have blood in your stools.     · You have new belly pain, or your pain gets worse.     · You have a new rash. Watch closely for changes in your health, and be sure to contact your doctor if:    · You start to get better and then get worse.     · You do not get better as expected. Where can you learn more? Go to http://www.gray.com/  Enter L906 in the search box to learn more about \"Viral Infections: Care Instructions. \"  Current as of: July 1, 2021               Content Version: 13.0  © 2006-2021 Healthwise, [employer]. Care instructions adapted under license by [employer] (which disclaims liability or warranty for this information). If you have questions about a medical condition or this instruction, always ask your healthcare professional. [address] any warranty or liability for your use of this information.

## 2021-10-08 NOTE — PROGRESS NOTES
Patient to 437 for regen-cov infusion. VS taken. IV started. Pt states COVID symptoms started 10/5 pm with positive test 10/6. Unsure of exposure.

## 2021-10-08 NOTE — PROGRESS NOTES
Discharge instruction given. States understanding. IV removed. Pt ambulated out to personal auto with no assistance needed.

## 2021-10-08 NOTE — H&P
CC: covid +  31 y.o. female  at 9w2d  weeks gestation who requests regen-cov treatement. Was dxed 2 days ago, sx for 5 days total.  also positive. Has not been vaccinated prior. Sx mostly sinus congestion and mild cough.     Her pregnancy issues include: Polycystic ovary complicating pregnancy, antepartum  Uterine fibroids affecting pregnancy, third trimester  Obesity affecting pregnancy in third trimester  Pregnancy w/ hx of uterine myomectomy  Supervision of high risk pregnancy in third trimester  Pre-existing type 2 diabetes mellitus in pregnancy in third trimester  Tobacco smoking complicating pregnancy, third trimester  Severe pre-eclampsia  Obesity, Class III, BMI 40-49.9 (morbid obesity) (HCC)  PCOS (polycystic ovarian syndrome)    HISTORY:  OB History    Para Term  AB Living   3 1 1 0 1 1   SAB TAB Ectopic Molar Multiple Live Births   1 0 0   0 1      # Outcome Date GA Lbr Ilya/2nd Weight Sex Delivery Anes PTL Lv   3 Current            2 Term 17 37w2d 58:00 / 02:31 2.555 kg M Vag-Spont EPIDURAL AN N CHRISTAL   1 SAB 13 6w0d             Birth Comments: No D&C done       Past Surgical History:   Procedure Laterality Date    HX CHOLECYSTECTOMY  2015    Dr Fred Allen      HX [address] [place]    HX WISDOM TEETH EXTRACTION         Past Medical History:   Diagnosis Date    Asthma     prn inhaler    Cholelithiasis with cholecystitis 2015    Diabetes ([address]     GERD (gastroesophageal reflux disease)     controlled with medication    Gestational diabetes     Gestational hypertension     History of miscarriage 2014    Infertility, female     Morbid obesity ([address]     bmi =51    Nausea & vomiting     with every surg--3-4 times-- then ok per pt    PCOS (polycystic ovarian syndrome)     Polycystic disease, ovaries     Pregnancy induced hypertension, antepartum 2017    Psychiatric disorder     anxiety-- per pt-- no tx    PUD (peptic ulcer disease)     no problems since    Sleep apnea     ?--with 2014 surg [facility]--- per pt never has been tested       Allergies   Allergen Reactions    Latex Rash and Itching    Tree Nut Anaphylaxis    Codeine Nausea and Vomiting    Sulfa (Sulfonamide Antibiotics) Nausea and Vomiting    Naproxen Other (comments)     Can not take due to history peptic ulcers       Family History   Problem Relation Age of Onset    Hypertension Mother     Migraines Mother     Cancer Mother         cervical    Elevated Lipids Father     Breast Cancer Neg Hx     Colon Cancer Neg Hx     Ovarian Cancer Neg Hx        Social History     Socioeconomic History    Marital status:      Spouse name: Not on file    Number of children: Not on file    Years of education: Not on file    Highest education level: Not on file   Occupational History    Not on file   Tobacco Use    Smoking status: Former Smoker     Packs/day: 1.00     Years: 9.00     Pack years: 9.00     Quit date: 2021     Years since quittin.0    Smokeless tobacco: Never Used   Vaping Use    Vaping Use: Never used   Substance and Sexual Activity    Alcohol use: No    Drug use: No     Types: Marijuana     Comment: last time used- 2015    Sexual activity: Yes     Partners: Male     Birth control/protection: Pill   Other Topics Concern     Service Not Asked    Blood Transfusions Not Asked    Caffeine Concern Not Asked    Occupational Exposure Not Asked    Hobby Hazards Not Asked    Sleep Concern Not Asked    Stress Concern Not Asked    Weight Concern Not Asked    Special Diet Not Asked    Back Care Not Asked    Exercise Not Asked    Bike Helmet Not Asked    Seat Belt Not Asked    Self-Exams Not Asked   Social History Narrative    Not on file     Social Determinants of Health     Financial Resource Strain:     Difficulty of Paying Living Expenses:    Food Insecurity:     Worried About Running Out of Food in the Last Year:    [address] in the Last Year:    Transportation Needs:     Lack of Transportation (Medical):  Lack of Transportation (Non-Medical):    Physical Activity:     Days of Exercise per Week:     Minutes of Exercise per Session:    Stress:     Feeling of Stress :    Social Connections:     Frequency of Communication with Friends and Family:     Frequency of Social Gatherings with Friends and Family:     Attends Restorationist Services:     Active Member of Clubs or Organizations:     Attends Club or Organization Meetings:     Marital Status:    Intimate Partner Violence:     Fear of Current or Ex-Partner:     Emotionally Abused:     Physically Abused:     Sexually Abused:        ROS:  Negative:   negative 10 point ROS except as noted in HPI    Positive:   per hpi    PHYSICAL EXAM:  Blood pressure 136/64, pulse 72, temperature 98.7 °F (37.1 °C), resp. rate 18, height 5' 3\" (1.6 m), weight 114.3 kg (252 lb), last menstrual period 08/04/2021, SpO2 98 %, not currently breastfeeding. The patient appears well, alert, oriented x 3. Appropriate affect. Lungs are clear. Heart RRR, no murmurs. Abdomen soft, non-tender, no rebound/guarding, normoactive bs. Fundus soft and non tender  Skin warm, dry, no rashes  Ext no edema, DTR's normal    Cervix: deferred    I have personally reviewed the patient's history, prenatal record, and pertinent test results. vital sign trends, previous provider notes support my clinical impression. Assessment:  31 y.o. female at 9w2d  covid pos in first trimester. candidate for regen-cov    Plan:  Patient given information sheet. Desires to proceed. Pharmacy contacted. Will d/s after infusion. Recommendations for self medication reviewed.       Signed By:  Corrie Bergeron MD     October 8, 2021

## 2022-01-05 PROBLEM — O14.10 SEVERE PRE-ECLAMPSIA: Status: RESOLVED | Noted: 2017-12-22 | Resolved: 2022-01-05

## 2022-01-05 PROBLEM — O98.512 COVID-19 AFFECTING PREGNANCY IN SECOND TRIMESTER: Status: ACTIVE | Noted: 2021-10-08

## 2022-01-05 PROBLEM — O24.113 PRE-EXISTING TYPE 2 DIABETES MELLITUS IN PREGNANCY IN THIRD TRIMESTER: Status: RESOLVED | Noted: 2017-09-19 | Resolved: 2022-01-05

## 2022-01-28 PROBLEM — O24.414 INSULIN CONTROLLED GESTATIONAL DIABETES MELLITUS (GDM) IN SECOND TRIMESTER: Status: ACTIVE | Noted: 2022-01-28

## 2022-01-28 PROBLEM — O24.410 DIET CONTROLLED GESTATIONAL DIABETES MELLITUS (GDM) IN SECOND TRIMESTER: Status: ACTIVE | Noted: 2022-01-28

## 2022-01-28 PROBLEM — O99.332 TOBACCO SMOKING COMPLICATING PREGNANCY, SECOND TRIMESTER: Status: RESOLVED | Noted: 2017-09-19 | Resolved: 2022-01-28

## 2022-02-09 PROBLEM — O24.415 GESTATIONAL DIABETES MELLITUS (GDM) IN SECOND TRIMESTER CONTROLLED ON ORAL HYPOGLYCEMIC DRUG: Status: ACTIVE | Noted: 2022-01-28

## 2022-03-19 PROBLEM — O24.414 INSULIN CONTROLLED GESTATIONAL DIABETES MELLITUS (GDM) IN THIRD TRIMESTER: Status: ACTIVE | Noted: 2022-01-28

## 2022-03-19 PROBLEM — O34.29 PREGNANCY W/ HX OF UTERINE MYOMECTOMY: Status: ACTIVE | Noted: 2017-09-18

## 2022-03-19 PROBLEM — O98.512 COVID-19 AFFECTING PREGNANCY IN SECOND TRIMESTER: Status: ACTIVE | Noted: 2021-10-08

## 2022-03-19 PROBLEM — U07.1 COVID-19 AFFECTING PREGNANCY IN SECOND TRIMESTER: Status: ACTIVE | Noted: 2021-10-08

## 2022-03-19 PROBLEM — O09.93 SUPERVISION OF HIGH RISK PREGNANCY IN THIRD TRIMESTER: Status: ACTIVE | Noted: 2017-09-19

## 2022-03-19 PROBLEM — O34.13 UTERINE FIBROIDS AFFECTING PREGNANCY, THIRD TRIMESTER: Status: ACTIVE | Noted: 2017-07-04

## 2022-03-19 PROBLEM — D25.9 UTERINE FIBROIDS AFFECTING PREGNANCY, THIRD TRIMESTER: Status: ACTIVE | Noted: 2017-07-04

## 2022-03-20 PROBLEM — O99.213 OBESITY AFFECTING PREGNANCY IN THIRD TRIMESTER: Status: ACTIVE | Noted: 2017-08-09

## 2022-03-25 PROBLEM — O09.299 HX OF PREECLAMPSIA, PRIOR PREGNANCY, CURRENTLY PREGNANT: Status: ACTIVE | Noted: 2022-03-25

## 2022-04-14 ENCOUNTER — HOSPITAL ENCOUNTER (INPATIENT)
Age: 37
LOS: 3 days | Discharge: HOME | End: 2022-04-17
Attending: OBSTETRICS & GYNECOLOGY | Admitting: OBSTETRICS & GYNECOLOGY

## 2022-04-14 PROBLEM — O16.3 HYPERTENSION AFFECTING PREGNANCY IN THIRD TRIMESTER: Status: ACTIVE | Noted: 2022-04-14

## 2022-04-14 PROBLEM — Z34.90 ENCOUNTER FOR INDUCTION OF LABOR: Status: ACTIVE | Noted: 2022-04-14

## 2022-04-14 LAB
ABO + RH BLD: NORMAL
ALBUMIN SERPL-MCNC: 2.4 G/DL (ref 3.5–5)
ALBUMIN/GLOB SERPL: 0.5 {RATIO} (ref 1.2–3.5)
ALP SERPL-CCNC: 78 U/L (ref 50–130)
ALT SERPL-CCNC: 19 U/L (ref 12–65)
ANION GAP SERPL CALC-SCNC: 9 MMOL/L (ref 7–16)
AST SERPL-CCNC: 37 U/L (ref 15–37)
BILIRUB SERPL-MCNC: 0.3 MG/DL (ref 0.2–1.1)
BLOOD GROUP ANTIBODIES SERPL: NORMAL
BUN SERPL-MCNC: 6 MG/DL (ref 6–23)
CALCIUM SERPL-MCNC: 9.2 MG/DL (ref 8.3–10.4)
CHLORIDE SERPL-SCNC: 105 MMOL/L (ref 98–107)
CO2 SERPL-SCNC: 23 MMOL/L (ref 21–32)
CREAT SERPL-MCNC: 0.71 MG/DL (ref 0.6–1)
ERYTHROCYTE [DISTWIDTH] IN BLOOD BY AUTOMATED COUNT: 12.6 % (ref 11.9–14.6)
GLOBULIN SER CALC-MCNC: 4.6 G/DL (ref 2.3–3.5)
GLUCOSE BLD STRIP.AUTO-MCNC: 109 MG/DL (ref 65–100)
GLUCOSE BLD STRIP.AUTO-MCNC: 111 MG/DL (ref 65–100)
GLUCOSE SERPL-MCNC: 107 MG/DL (ref 65–100)
HCT VFR BLD AUTO: 37.6 % (ref 35.8–46.3)
HGB BLD-MCNC: 12.8 G/DL (ref 11.7–15.4)
LDH SERPL L TO P-CCNC: 342 U/L (ref 100–190)
MCH RBC QN AUTO: 29.4 PG (ref 26.1–32.9)
MCHC RBC AUTO-ENTMCNC: 34 G/DL (ref 31.4–35)
MCV RBC AUTO: 86.4 FL (ref 79.6–97.8)
NRBC # BLD: 0 K/UL (ref 0–0.2)
PLATELET # BLD AUTO: 290 K/UL (ref 150–450)
PMV BLD AUTO: 9.4 FL (ref 9.4–12.3)
POTASSIUM SERPL-SCNC: 4.9 MMOL/L (ref 3.5–5.1)
PROT SERPL-MCNC: 7 G/DL (ref 6.3–8.2)
RBC # BLD AUTO: 4.35 M/UL (ref 4.05–5.2)
SERVICE CMNT-IMP: ABNORMAL
SERVICE CMNT-IMP: ABNORMAL
SODIUM SERPL-SCNC: 137 MMOL/L (ref 136–145)
SPECIMEN EXP DATE BLD: NORMAL
URATE SERPL-MCNC: 5.2 MG/DL (ref 2.6–6)
WBC # BLD AUTO: 12.4 K/UL (ref 4.3–11.1)

## 2022-04-14 PROCEDURE — 83615 LACTATE (LD) (LDH) ENZYME: CPT

## 2022-04-14 PROCEDURE — 74011000258 HC RX REV CODE- 258: Performed by: OBSTETRICS & GYNECOLOGY

## 2022-04-14 PROCEDURE — 74011250637 HC RX REV CODE- 250/637: Performed by: OBSTETRICS & GYNECOLOGY

## 2022-04-14 PROCEDURE — 84550 ASSAY OF BLOOD/URIC ACID: CPT

## 2022-04-14 PROCEDURE — 86900 BLOOD TYPING SEROLOGIC ABO: CPT

## 2022-04-14 PROCEDURE — 65270000029 HC RM PRIVATE

## 2022-04-14 PROCEDURE — 85027 COMPLETE CBC AUTOMATED: CPT

## 2022-04-14 PROCEDURE — 80053 COMPREHEN METABOLIC PANEL: CPT

## 2022-04-14 PROCEDURE — 82962 GLUCOSE BLOOD TEST: CPT

## 2022-04-14 PROCEDURE — 74011250636 HC RX REV CODE- 250/636: Performed by: OBSTETRICS & GYNECOLOGY

## 2022-04-14 RX ORDER — BUTORPHANOL TARTRATE 2 MG/ML
1 INJECTION INTRAMUSCULAR; INTRAVENOUS
Status: DISCONTINUED | OUTPATIENT
Start: 2022-04-14 | End: 2022-04-16 | Stop reason: HOSPADM

## 2022-04-14 RX ORDER — LIDOCAINE HYDROCHLORIDE 20 MG/ML
JELLY TOPICAL
Status: ACTIVE | OUTPATIENT
Start: 2022-04-14 | End: 2022-04-15

## 2022-04-14 RX ORDER — SODIUM CHLORIDE 0.9 % (FLUSH) 0.9 %
5-40 SYRINGE (ML) INJECTION EVERY 8 HOURS
Status: DISCONTINUED | OUTPATIENT
Start: 2022-04-14 | End: 2022-04-16 | Stop reason: ALTCHOICE

## 2022-04-14 RX ORDER — SODIUM CHLORIDE 0.9 % (FLUSH) 0.9 %
5-40 SYRINGE (ML) INJECTION AS NEEDED
Status: DISCONTINUED | OUTPATIENT
Start: 2022-04-14 | End: 2022-04-16 | Stop reason: ALTCHOICE

## 2022-04-14 RX ORDER — INSULIN LISPRO 100 [IU]/ML
INJECTION, SOLUTION INTRAVENOUS; SUBCUTANEOUS AS NEEDED
Status: DISCONTINUED | OUTPATIENT
Start: 2022-04-14 | End: 2022-04-17 | Stop reason: HOSPADM

## 2022-04-14 RX ORDER — SODIUM CHLORIDE 0.9 % (FLUSH) 0.9 %
5-40 SYRINGE (ML) INJECTION AS NEEDED
Status: CANCELLED | OUTPATIENT
Start: 2022-04-14

## 2022-04-14 RX ORDER — NIFEDIPINE 10 MG/1
10 CAPSULE ORAL
Status: ACTIVE | OUTPATIENT
Start: 2022-04-14 | End: 2022-04-15

## 2022-04-14 RX ORDER — OXYTOCIN/RINGER'S LACTATE 30/500 ML
87.3 PLASTIC BAG, INJECTION (ML) INTRAVENOUS AS NEEDED
Status: DISCONTINUED | OUTPATIENT
Start: 2022-04-14 | End: 2022-04-16 | Stop reason: ALTCHOICE

## 2022-04-14 RX ORDER — SODIUM CHLORIDE 0.9 % (FLUSH) 0.9 %
5-40 SYRINGE (ML) INJECTION EVERY 8 HOURS
Status: CANCELLED | OUTPATIENT
Start: 2022-04-14

## 2022-04-14 RX ORDER — MINERAL OIL
120 OIL (ML) ORAL
Status: DISPENSED | OUTPATIENT
Start: 2022-04-14 | End: 2022-04-15

## 2022-04-14 RX ORDER — OXYTOCIN/RINGER'S LACTATE 30/500 ML
10 PLASTIC BAG, INJECTION (ML) INTRAVENOUS AS NEEDED
Status: DISCONTINUED | OUTPATIENT
Start: 2022-04-14 | End: 2022-04-16 | Stop reason: ALTCHOICE

## 2022-04-14 RX ORDER — NIFEDIPINE 10 MG/1
20 CAPSULE ORAL
Status: DISCONTINUED | OUTPATIENT
Start: 2022-04-14 | End: 2022-04-16 | Stop reason: ALTCHOICE

## 2022-04-14 RX ORDER — LABETALOL HYDROCHLORIDE 5 MG/ML
20 INJECTION, SOLUTION INTRAVENOUS
Status: ACTIVE | OUTPATIENT
Start: 2022-04-14 | End: 2022-04-15

## 2022-04-14 RX ORDER — DEXTROSE, SODIUM CHLORIDE, SODIUM LACTATE, POTASSIUM CHLORIDE, AND CALCIUM CHLORIDE 5; .6; .31; .03; .02 G/100ML; G/100ML; G/100ML; G/100ML; G/100ML
125 INJECTION, SOLUTION INTRAVENOUS CONTINUOUS
Status: DISCONTINUED | OUTPATIENT
Start: 2022-04-14 | End: 2022-04-16

## 2022-04-14 RX ORDER — ACETAMINOPHEN 325 MG/1
325 TABLET ORAL
Status: DISCONTINUED | OUTPATIENT
Start: 2022-04-14 | End: 2022-04-17 | Stop reason: HOSPADM

## 2022-04-14 RX ORDER — LIDOCAINE HYDROCHLORIDE 10 MG/ML
1 INJECTION INFILTRATION; PERINEURAL
Status: ACTIVE | OUTPATIENT
Start: 2022-04-14 | End: 2022-04-15

## 2022-04-14 RX ADMIN — ACETAMINOPHEN 325 MG: 325 TABLET, FILM COATED ORAL at 21:48

## 2022-04-14 RX ADMIN — Medication 50 MCG: at 22:03

## 2022-04-14 RX ADMIN — SODIUM CHLORIDE, SODIUM LACTATE, POTASSIUM CHLORIDE, CALCIUM CHLORIDE, AND DEXTROSE MONOHYDRATE 125 ML/HR: 600; 310; 30; 20; 5 INJECTION, SOLUTION INTRAVENOUS at 21:58

## 2022-04-14 RX ADMIN — SODIUM CHLORIDE 5 MILLION UNITS: 900 INJECTION INTRAVENOUS at 21:59

## 2022-04-15 ENCOUNTER — ANESTHESIA EVENT (OUTPATIENT)
Dept: LABOR AND DELIVERY | Age: 37
End: 2022-04-15

## 2022-04-15 ENCOUNTER — ANESTHESIA (OUTPATIENT)
Dept: LABOR AND DELIVERY | Age: 37
End: 2022-04-15

## 2022-04-15 PROBLEM — O11.9 CHRONIC HYPERTENSION WITH SUPERIMPOSED PRE-ECLAMPSIA: Status: ACTIVE | Noted: 2022-04-15

## 2022-04-15 LAB
GLUCOSE BLD STRIP.AUTO-MCNC: 104 MG/DL (ref 65–100)
GLUCOSE BLD STRIP.AUTO-MCNC: 105 MG/DL (ref 65–100)
GLUCOSE BLD STRIP.AUTO-MCNC: 114 MG/DL (ref 65–100)
GLUCOSE BLD STRIP.AUTO-MCNC: 87 MG/DL (ref 65–100)
GLUCOSE BLD STRIP.AUTO-MCNC: 91 MG/DL (ref 65–100)
GLUCOSE BLD STRIP.AUTO-MCNC: 92 MG/DL (ref 65–100)
GLUCOSE BLD STRIP.AUTO-MCNC: 93 MG/DL (ref 65–100)
GLUCOSE BLD STRIP.AUTO-MCNC: 93 MG/DL (ref 65–100)
GLUCOSE BLD STRIP.AUTO-MCNC: 96 MG/DL (ref 65–100)
SERVICE CMNT-IMP: ABNORMAL
SERVICE CMNT-IMP: NORMAL

## 2022-04-15 PROCEDURE — 82962 GLUCOSE BLOOD TEST: CPT

## 2022-04-15 PROCEDURE — 10907ZC DRAINAGE OF AMNIOTIC FLUID, THERAPEUTIC FROM PRODUCTS OF CONCEPTION, VIA NATURAL OR ARTIFICIAL OPENING: ICD-10-PCS | Performed by: OBSTETRICS & GYNECOLOGY

## 2022-04-15 PROCEDURE — 75410000003 HC RECOV DEL/VAG/CSECN EA 0.5 HR: Performed by: OBSTETRICS & GYNECOLOGY

## 2022-04-15 PROCEDURE — 74011250637 HC RX REV CODE- 250/637: Performed by: OBSTETRICS & GYNECOLOGY

## 2022-04-15 PROCEDURE — 74011250636 HC RX REV CODE- 250/636: Performed by: ANESTHESIOLOGY

## 2022-04-15 PROCEDURE — 75410000002 HC LABOR FEE PER 1 HR: Performed by: OBSTETRICS & GYNECOLOGY

## 2022-04-15 PROCEDURE — 77030014125 HC TY EPDRL BBMI -B: Performed by: ANESTHESIOLOGY

## 2022-04-15 PROCEDURE — 59400 OBSTETRICAL CARE: CPT | Performed by: OBSTETRICS & GYNECOLOGY

## 2022-04-15 PROCEDURE — 3E0P7VZ INTRODUCTION OF HORMONE INTO FEMALE REPRODUCTIVE, VIA NATURAL OR ARTIFICIAL OPENING: ICD-10-PCS | Performed by: OBSTETRICS & GYNECOLOGY

## 2022-04-15 PROCEDURE — 65270000029 HC RM PRIVATE

## 2022-04-15 PROCEDURE — 75410000000 HC DELIVERY VAGINAL/SINGLE: Performed by: OBSTETRICS & GYNECOLOGY

## 2022-04-15 PROCEDURE — 3E0DXGC INTRODUCTION OF OTHER THERAPEUTIC SUBSTANCE INTO MOUTH AND PHARYNX, EXTERNAL APPROACH: ICD-10-PCS | Performed by: OBSTETRICS & GYNECOLOGY

## 2022-04-15 PROCEDURE — 0HQ9XZZ REPAIR PERINEUM SKIN, EXTERNAL APPROACH: ICD-10-PCS | Performed by: OBSTETRICS & GYNECOLOGY

## 2022-04-15 PROCEDURE — 74011000258 HC RX REV CODE- 258: Performed by: OBSTETRICS & GYNECOLOGY

## 2022-04-15 PROCEDURE — A4300 CATH IMPL VASC ACCESS PORTAL: HCPCS | Performed by: ANESTHESIOLOGY

## 2022-04-15 PROCEDURE — 74011250636 HC RX REV CODE- 250/636: Performed by: OBSTETRICS & GYNECOLOGY

## 2022-04-15 PROCEDURE — 74011000250 HC RX REV CODE- 250: Performed by: ANESTHESIOLOGY

## 2022-04-15 PROCEDURE — 76060000078 HC EPIDURAL ANESTHESIA: Performed by: OBSTETRICS & GYNECOLOGY

## 2022-04-15 RX ORDER — DIPHENHYDRAMINE HYDROCHLORIDE 50 MG/ML
25 INJECTION, SOLUTION INTRAMUSCULAR; INTRAVENOUS ONCE
Status: COMPLETED | OUTPATIENT
Start: 2022-04-15 | End: 2022-04-15

## 2022-04-15 RX ORDER — DIPHENHYDRAMINE HYDROCHLORIDE 50 MG/ML
INJECTION, SOLUTION INTRAMUSCULAR; INTRAVENOUS
Status: DISCONTINUED
Start: 2022-04-15 | End: 2022-04-16 | Stop reason: ALTCHOICE

## 2022-04-15 RX ORDER — ONDANSETRON 2 MG/ML
4 INJECTION INTRAMUSCULAR; INTRAVENOUS
Status: DISCONTINUED | OUTPATIENT
Start: 2022-04-15 | End: 2022-04-16 | Stop reason: ALTCHOICE

## 2022-04-15 RX ORDER — ROPIVACAINE HYDROCHLORIDE 2 MG/ML
INJECTION, SOLUTION EPIDURAL; INFILTRATION; PERINEURAL
Status: DISCONTINUED | OUTPATIENT
Start: 2022-04-15 | End: 2022-04-15 | Stop reason: HOSPADM

## 2022-04-15 RX ORDER — MINERAL OIL
120 OIL (ML) ORAL AS NEEDED
Status: DISCONTINUED | OUTPATIENT
Start: 2022-04-15 | End: 2022-04-16 | Stop reason: ALTCHOICE

## 2022-04-15 RX ORDER — LIDOCAINE HYDROCHLORIDE AND EPINEPHRINE 15; 5 MG/ML; UG/ML
INJECTION, SOLUTION EPIDURAL
Status: COMPLETED | OUTPATIENT
Start: 2022-04-15 | End: 2022-04-15

## 2022-04-15 RX ORDER — OXYTOCIN/RINGER'S LACTATE 30/500 ML
0-30 PLASTIC BAG, INJECTION (ML) INTRAVENOUS
Status: DISCONTINUED | OUTPATIENT
Start: 2022-04-15 | End: 2022-04-16

## 2022-04-15 RX ADMIN — ROPIVACAINE HYDROCHLORIDE 8 ML/HR: 2 INJECTION, SOLUTION EPIDURAL; INFILTRATION at 12:52

## 2022-04-15 RX ADMIN — SODIUM CHLORIDE 2.5 MILLION UNITS: 9 INJECTION, SOLUTION INTRAVENOUS at 18:54

## 2022-04-15 RX ADMIN — SODIUM CHLORIDE 2.5 MILLION UNITS: 9 INJECTION, SOLUTION INTRAVENOUS at 14:37

## 2022-04-15 RX ADMIN — Medication 50 MCG: at 08:14

## 2022-04-15 RX ADMIN — BUTORPHANOL TARTRATE 1 MG: 2 INJECTION, SOLUTION INTRAMUSCULAR; INTRAVENOUS at 12:27

## 2022-04-15 RX ADMIN — SODIUM CHLORIDE, SODIUM LACTATE, POTASSIUM CHLORIDE, CALCIUM CHLORIDE, AND DEXTROSE MONOHYDRATE 125 ML/HR: 600; 310; 30; 20; 5 INJECTION, SOLUTION INTRAVENOUS at 14:53

## 2022-04-15 RX ADMIN — ONDANSETRON 4 MG: 2 INJECTION INTRAMUSCULAR; INTRAVENOUS at 14:53

## 2022-04-15 RX ADMIN — MINERAL OIL 120 ML: 1000 LIQUID ORAL at 20:20

## 2022-04-15 RX ADMIN — SODIUM CHLORIDE 2.5 MILLION UNITS: 9 INJECTION, SOLUTION INTRAVENOUS at 06:35

## 2022-04-15 RX ADMIN — SODIUM CHLORIDE 2.5 MILLION UNITS: 9 INJECTION, SOLUTION INTRAVENOUS at 02:51

## 2022-04-15 RX ADMIN — LIDOCAINE HYDROCHLORIDE,EPINEPHRINE BITARTRATE 5 ML: 15; .005 INJECTION, SOLUTION EPIDURAL; INFILTRATION; INTRACAUDAL; PERINEURAL at 12:43

## 2022-04-15 RX ADMIN — Medication 2 MILLI-UNITS/MIN: at 12:16

## 2022-04-15 RX ADMIN — Medication 50 MCG: at 03:06

## 2022-04-15 RX ADMIN — DIPHENHYDRAMINE HYDROCHLORIDE 25 MG: 50 INJECTION, SOLUTION INTRAMUSCULAR; INTRAVENOUS at 20:28

## 2022-04-15 RX ADMIN — SODIUM CHLORIDE, SODIUM LACTATE, POTASSIUM CHLORIDE, CALCIUM CHLORIDE, AND DEXTROSE MONOHYDRATE 125 ML/HR: 600; 310; 30; 20; 5 INJECTION, SOLUTION INTRAVENOUS at 06:35

## 2022-04-15 RX ADMIN — SODIUM CHLORIDE 2.5 MILLION UNITS: 9 INJECTION, SOLUTION INTRAVENOUS at 10:10

## 2022-04-15 NOTE — PROGRESS NOTES
External wireless novii monitor removed due to poor tracing, replaced by external fetal heart monitor and toco.

## 2022-04-15 NOTE — ANESTHESIA PREPROCEDURE EVALUATION
Relevant Problems   CARDIOVASCULAR   (+) Chronic hypertension with superimposed pre-eclampsia   (+) Hypertension affecting pregnancy in third trimester      ENDOCRINE   (+) Insulin controlled gestational diabetes mellitus (GDM) in third trimester   (+) Obesity affecting pregnancy in third trimester       Anesthetic History     PONV          Review of Systems / Medical History  Patient summary reviewed, nursing notes reviewed and pertinent labs reviewed    Pulmonary        Sleep apnea    Asthma : well controlled       Neuro/Psych         Psychiatric history     Cardiovascular    Hypertension              Exercise tolerance: >4 METS     GI/Hepatic/Renal     GERD           Endo/Other    Diabetes: using insulin    Morbid obesity     Other Findings   Comments: PCOS           Physical Exam    Airway  Mallampati: II           Cardiovascular  Regular rate and rhythm,  S1 and S2 normal,  no murmur, click, rub, or gallop             Dental  No notable dental hx       Pulmonary  Breath sounds clear to auscultation               Abdominal         Other Findings            Anesthetic Plan    ASA: 3  Anesthesia type: epidural            Anesthetic plan and risks discussed with: Patient

## 2022-04-15 NOTE — H&P
History & Physical    Name: Martin Youssef MRN: [id number]  SSN: [id number]    YOB: 1985  Age: 31 y.o. Sex: female      Subjective:     Estimated Date of Delivery: 22  OB History    Para Term  AB Living   3 1 1 0 1 1   SAB IAB Ectopic Molar Multiple Live Births   1 0 0   0 1      # Outcome Date GA Lbr Ilya/2nd Weight Sex Delivery Anes PTL Lv   3 Current            2 Term 17 37w2d 58:00 / 02:31 5 lb 10.1 oz (2.555 kg) M Vag-Spont EPIDURAL AN N CHRISTAL   1 SAB 13 6w0d             Birth Comments: No D&C done       Ms. Deshawn Pickens is admitted from [place] [address] office with pregnancy at 37w0d for induction of labor due to CHTN/PALOMA. Was seen at [facility] and had c/o HA, edema, not feeling well. Prenatal course was complicated by chronic hypertension, diabetes - gestational and obesity, covid19 in 2nd tri, uterine fibroids. GDM has required insulin. Please see prenatal records for details. Past Medical History:   Diagnosis Date    Asthma     prn inhaler    Cholelithiasis with cholecystitis 2015    GERD (gastroesophageal reflux disease)     controlled with medication    Gestational diabetes     Gestational hypertension     History of miscarriage 2014    Infertility, female     Morbid obesity ([address]     bmi =51    Nausea & vomiting     with every surg--3-4 times-- then ok per pt    Polycystic disease, ovaries     Pre-existing type 2 diabetes mellitus in pregnancy in third trimester 2017 [facility]:  Appropriate fetal growth, reassuring fetal status. AC 17%, overall 22%, ED 17.8 cm, UA Dopplers WNL, BPP 8/8. Glucose log reviewed. Ranges from 77 to 164. Several PP's elevated. Reviewed diet and suggested increase in protein. Loosen PP parameters <140. Current Dose is now Levemir 16/16 units and Humalog .    Follow up at [facility] in 3 weeks for fetal growth and BPP and     Pregnancy induced hypertension, antepartum 2017    Psychiatric disorder anxiety-- per pt-- no tx    PUD (peptic ulcer disease) 2009    no problems since    Severe pre-eclampsia 12/22/2017    IOL at 37wks w/ PreE w/OUT severe features; developed several severe bps on L&D and recurrent severe bps pp; no Mag in labor--will hold for now. Start Procardia 30 XL BID.  Sleep apnea     ?--with 2014 surg at [facility]--- per pt never has been tested               Prior to Admission medications    Medication Sig Start Date End Date Taking? Authorizing Provider   insulin glargine (LANTUS) 100 unit/mL injection 10-15 units twice per day as directed; run generic  Patient taking differently: 10-15 units twice per day as directed; run generic. 8 units in AM and 10 units in PM. 3/10/22  Yes Hay Bermeo MD   calcium carbonate (TUMS) 200 mg calcium (500 mg) chew Take 1 Tablet by mouth daily. Yes Provider, Historical   metFORMIN (GLUCOPHAGE) 500 mg tablet Take 1 Tablet by mouth two (2) times daily (with meals). Increase to 1000mg as directed  Patient taking differently: Take 1,000 mg by mouth two (2) times daily (with meals). Increase to 1000mg as directed 1/28/22  Yes Matt Leonardo MD   BABY ASPIRIN PO Take 162 mg by mouth. Yes Provider, Historical   PNV OZ.86/NSYUTMJ fum/folic ac (PRENATAL PO) Take  by mouth. Yes Provider, Historical   famotidine (Pepcid) 20 mg tablet Take 20 mg by mouth two (2) times a day. Yes Provider, Historical   acetaminophen (TYLENOL) 325 mg tablet Take  by mouth every four (4) hours as needed for Pain. Yes Provider, Historical        Review of Systems: Pertinent items are noted in the History of Present Illness. Objective:     Vitals:  Vitals:    04/14/22 2159 04/14/22 2313 04/15/22 0123 04/15/22 0306   BP: (!) 149/70 (!) 150/70     Pulse: 83 86     Resp:   20 22   Temp:   98.3 °F (36.8 °C) 98.5 °F (36.9 °C)   SpO2:          Pt has had no severe range BPs since admission    Physical Exam:  Patient without distress.   Heart: Regular rate and rhythm  Lung: clear to auscultation throughout lung fields, no wheezes, no rales, no rhonchi and normal respiratory effort  Membranes:  Intact  Fetal Heart Rate: Reactive with baseline 140        Admission labs:  plts 290  Cr 0.7  Uric acid 5.2        Prenatal Labs:   Lab Results   Component Value Date/Time    ABO/Rh(D) A POSITIVE 2022 08:37 PM    Rubella, External Non Immune 2017 12:00 AM    RPR, External Negative 2017 12:00 AM    ABO,Rh A Positive 2017 12:00 AM    GrBStrep, External Negative 2017 12:00 AM       Impression/Plan:     Principal Problem:    Chronic hypertension with superimposed pre-eclampsia (4/15/2022)    Active Problems:    Hypertension affecting pregnancy in third trimester (2022)      Overview: 2022 [facility]: Pt w hx preE at 37wk, mild range BP throughout pregnancy. CHTN. Elevated BPs today high 150s/70s- new. Patient sent to [facility] for induction of labor. Uterine fibroids affecting pregnancy, third trimester (2017)      Overview: Pt s/p \"myomectomy\" by Dr. Bobby Collier - performed by hysteroscopic resection       (Myosure) and pathology consistent with benign endomyometrial tissue. Fibroid remained the same on comparison of pre and post procedure       ultrasounds. Very unlikely that pt had a deep resection and should not       require  delivery due to this procedure. Obesity affecting pregnancy in third trimester (2017)      Overview: 34yo       Obese, PCOS, failed early 1hr - has refused MFM multiple times this       pregnancy. Agreed to go now with being treated for GDM (although very       likely preexisting DM since previously on metformin, etc). COVID-19 affecting pregnancy in second trimester (10/8/2021)      Overview: +Covid 10/6/21; Regeneron infusion.       +Covid 22               Insulin controlled gestational diabetes mellitus (GDM) in third trimester (2022) Overview: . .. 3/10/22: Resent script to [facility]; Pt to continue       Metformin ER 1000 mg BID and start LA Insulin 8/8.      3/18/22:Continue Metformin ER 1000 BID and Lantus 8/8.             3/25/2022 at [facility]: Excellent BS control and appropriate fetal growth. Most readings WNL. Continue Metformin ER 1000 mg BID and Lantus 8/8.      4/2/22: Log reviewed, most readings WNL with some fluctuating elevations,       would suggest increasing Lantus at night to 10-12 units if they continue. Continue Metformin ER 1000 mg, Lantus 8/8. Hx of preeclampsia, prior pregnancy, currently pregnant (3/25/2022)      Overview: 3/25/2022 at [facility]: Was admitted with last pregnancy with increased BP and       proteinuria. Delivered at 37w2d. Sent some on Procardia XL 30mg. Encounter for induction of labor (4/14/2022)         Plan: Admit for induction of labor. Group B Strep positive, will treat prophylactically with penicillin.

## 2022-04-15 NOTE — PROGRESS NOTES
Dr. Ernesto Martin at bedside to place FSE for accurate tracing. Patient cervix checked and then placed in throne position.

## 2022-04-15 NOTE — PROGRESS NOTES
Dr. Herson Cortez notified of patient admission to unit. The following vital signs and lab values reported to MD.      04/14/22 2057 04/14/22 2112   Vitals   /80 (!) 141/81   MAP (Monitor) 105 104   MAP (Calculated) 100 101        Ref. Range 4/14/2022 21:09   GLUCOSE,FAST - POC Latest Ref Range: 65 - 100 mg/dL 111 (H)        Ref. Range 4/14/2022 20:36   ALT Latest Ref Range: 12 - 65 U/L 19   AST Latest Ref Range: 15 - 37 U/L 37      Ref. Range 4/14/2022 20:36   HGB Latest Ref Range: 11.7 - 15.4 g/dL 12.8   HCT Latest Ref Range: 35.8 - 46.3 % 37.6      Ref. Range 4/14/2022 20:36   PLATELET Latest Ref Range: 150 - 450 K/uL 290      Ref. Range 4/14/2022 20:36   Uric acid Latest Ref Range: 2.6 - 6.0 MG/DL 5.2      Ref. Range 4/14/2022 20:36   LD Latest Ref Range: 100 - 190 U/L 342 (H)     Patient SVE 0.5/ 25/ -3. Per MD orders, start misoprostol 50 mcg buccal q4hrs, give penicillin G potassium 5 million units IV once and penicillin 2.5 million units q4hrs for + GBS, check BG q2hrs and follow sliding scale in MAR.

## 2022-04-15 NOTE — PROGRESS NOTES
[facility]  [address], [place] [address], [address]  [address] [facility], MD, Yumi Lieberman, [facility]    Labor Progress Note    Pt tolerating labor well.     Vitals:    04/15/22 1251 04/15/22 1254 04/15/22 1256 04/15/22 1258   BP: (!) 168/78 (!) 150/70 (!) 147/68 (!) 149/70   Pulse:  78 82 77   Resp:       Temp:       SpO2:           FHT's:  Baseline -140's, reactive  [place]:  Ctx q 2-5 min    SVE:  3/50/-3  Membranes:  AROM - copious, clear    Assessement and Plan: Deborra Mess 31 y.o.  at 37w0d admitted for IOL for CHTN with PALOMA    Continue pitocin augmentation     Pain control: epidural    GBS: POS      Sherry Guerra MD  1:44 PM  04/15/22

## 2022-04-15 NOTE — PROGRESS NOTES
Baseline 130/mod/+accels/no decels  Ctx's irreg q2-5min.  Feels them as mild  cx per nurse TFT/th/high/posterior  Pt on PCN for +GBS  Receiving cytotec

## 2022-04-15 NOTE — ANESTHESIA PROCEDURE NOTES
Epidural Block    Patient location during procedure: OB  Start time: 4/15/2022 12:43 PM  End time: 4/15/2022 12:53 PM  Reason for block: labor epidural  Staffing  Performed: attending   Anesthesiologist: Jluis Rodrigues MD  Preanesthetic Checklist  Completed: patient identified, risks and benefits discussed, surgical consent, pre-op evaluation and timeout performed  Block Placement  Patient position: sitting  Prep: ChloraPrep  Sterility prep: cap, drape, gloves, hand and mask  Sedation level: no sedation  Patient monitoring: continuous pulse oximetry and heart rate  Approach: midline  Location: lumbar  Lumbar location: L3-L4  Epidural  Loss of resistance technique: air  Guidance: landmark technique  Needle  Needle type: Tuohy   Needle gauge: 17 G  Needle length: 9 cm  Needle insertion depth: 6 cm  Catheter type: end hole  Catheter size: 19 G  Catheter at skin depth: 12 cm  Catheter securement method: clear occlusive dressing, liquid medical adhesive and surgical tape  Test dose: negative  Medications Administered  Lidocaine-EPINEPHrine (XYLOCAINE) 1.5 %-1:200,000 Epidural, 5 mL  Assessment  Number of attempts: 1  Procedure assessment: patient tolerated procedure well with no immediate complications

## 2022-04-15 NOTE — PROGRESS NOTES
Nutrition Note    Nutrition:  Best Practice Alert received for GDM. Diet: DIET NPO Sips of Water with Meds    Patient is admitted for induction of labor. Will defer assessment as per standard of care.   Mery Mazariegos RD, LD  Contact: [phone]

## 2022-04-15 NOTE — PROGRESS NOTES
Gregoria Fischer at bedside at [phone]. Assisted pt to sitting up on bedside at 1236. Timeout completed at [phone] with MD and myself at bedside. Test dose given at 1248. Negative reaction. Dose given at 1255. Pt assisted to lying back in left tilt position. See anesthesia record for details. See vital sign flow sheet for BP. Tolerated procedure well.

## 2022-04-15 NOTE — PROGRESS NOTES
Dr. Blake Sandy given status update. Reviewed pt history. Pt able to have clear liquids.  Will proceed with next dose of cytotec 50 mcg buccal.

## 2022-04-15 NOTE — PROGRESS NOTES
Dr. Shefali Addison notified of [address] 8/90/0 with recurring early and variable decelerations. MD en route to bedside.

## 2022-04-15 NOTE — PROGRESS NOTES
Dr. Aaron Hsieh updated on patient status. SVE 1/30/-3, patient has had 2 doses of misoprostol. Patient BP stable, tylenol given for a headache earlier this shift. Per MD order modify patient diet to NPO with sips of water with meds.

## 2022-04-16 PROCEDURE — 65270000029 HC RM PRIVATE

## 2022-04-16 PROCEDURE — 74011250637 HC RX REV CODE- 250/637: Performed by: OBSTETRICS & GYNECOLOGY

## 2022-04-16 PROCEDURE — 2709999900 HC NON-CHARGEABLE SUPPLY

## 2022-04-16 PROCEDURE — 74011250636 HC RX REV CODE- 250/636: Performed by: OBSTETRICS & GYNECOLOGY

## 2022-04-16 PROCEDURE — 90707 MMR VACCINE SC: CPT | Performed by: OBSTETRICS & GYNECOLOGY

## 2022-04-16 RX ORDER — IBUPROFEN 600 MG/1
600 TABLET ORAL EVERY 6 HOURS
Status: DISCONTINUED | OUTPATIENT
Start: 2022-04-16 | End: 2022-04-17 | Stop reason: HOSPADM

## 2022-04-16 RX ORDER — DOCUSATE SODIUM 100 MG/1
100 CAPSULE, LIQUID FILLED ORAL
Status: DISCONTINUED | OUTPATIENT
Start: 2022-04-16 | End: 2022-04-17 | Stop reason: HOSPADM

## 2022-04-16 RX ORDER — ZOLPIDEM TARTRATE 5 MG/1
5 TABLET ORAL
Status: DISCONTINUED | OUTPATIENT
Start: 2022-04-16 | End: 2022-04-17 | Stop reason: HOSPADM

## 2022-04-16 RX ORDER — HYDROCODONE BITARTRATE AND ACETAMINOPHEN 5; 325 MG/1; MG/1
1 TABLET ORAL
Status: DISCONTINUED | OUTPATIENT
Start: 2022-04-16 | End: 2022-04-17 | Stop reason: HOSPADM

## 2022-04-16 RX ORDER — DIPHENHYDRAMINE HCL 25 MG
25 CAPSULE ORAL
Status: DISCONTINUED | OUTPATIENT
Start: 2022-04-16 | End: 2022-04-17 | Stop reason: HOSPADM

## 2022-04-16 RX ORDER — OXYCODONE HYDROCHLORIDE 5 MG/1
5-10 TABLET ORAL
Status: DISCONTINUED | OUTPATIENT
Start: 2022-04-16 | End: 2022-04-17 | Stop reason: HOSPADM

## 2022-04-16 RX ORDER — SIMETHICONE 80 MG
80 TABLET,CHEWABLE ORAL
Status: DISCONTINUED | OUTPATIENT
Start: 2022-04-16 | End: 2022-04-17 | Stop reason: HOSPADM

## 2022-04-16 RX ORDER — PROMETHAZINE HYDROCHLORIDE 25 MG/1
25 TABLET ORAL
Status: DISCONTINUED | OUTPATIENT
Start: 2022-04-16 | End: 2022-04-17 | Stop reason: HOSPADM

## 2022-04-16 RX ORDER — FUROSEMIDE 20 MG/1
20 TABLET ORAL DAILY
Status: DISCONTINUED | OUTPATIENT
Start: 2022-04-16 | End: 2022-04-17 | Stop reason: HOSPADM

## 2022-04-16 RX ADMIN — IBUPROFEN 600 MG: 600 TABLET, FILM COATED ORAL at 14:54

## 2022-04-16 RX ADMIN — FUROSEMIDE 20 MG: 20 TABLET ORAL at 08:39

## 2022-04-16 RX ADMIN — ACETAMINOPHEN 325 MG: 325 TABLET, FILM COATED ORAL at 17:41

## 2022-04-16 RX ADMIN — IBUPROFEN 600 MG: 600 TABLET, FILM COATED ORAL at 07:57

## 2022-04-16 RX ADMIN — WITCH HAZEL 1 PAD: 500 SOLUTION RECTAL; TOPICAL at 02:10

## 2022-04-16 RX ADMIN — MEASLES, MUMPS, AND RUBELLA VIRUS VACCINE LIVE 0.5 ML: 1000; 12500; 1000 INJECTION, POWDER, LYOPHILIZED, FOR SUSPENSION SUBCUTANEOUS at 08:39

## 2022-04-16 RX ADMIN — IBUPROFEN 600 MG: 600 TABLET, FILM COATED ORAL at 00:11

## 2022-04-16 RX ADMIN — Medication 1 SPRAY: at 02:10

## 2022-04-16 RX ADMIN — ACETAMINOPHEN 325 MG: 325 TABLET, FILM COATED ORAL at 02:53

## 2022-04-16 RX ADMIN — ACETAMINOPHEN 325 MG: 325 TABLET, FILM COATED ORAL at 11:09

## 2022-04-16 RX ADMIN — IBUPROFEN 600 MG: 600 TABLET, FILM COATED ORAL at 21:22

## 2022-04-16 NOTE — L&D DELIVERY NOTE
[address], [address] [place] [address], [address]  [phone]    Akila Ulloa MD, Craig Schneider, [facility]  Delivery Note    Present for entire delivery. Details in delivery summary. . Viable Female Infant, APGARS 8,9 .   Weight pending (skin to skin)    EBL:  200 mL  Pain mgmt:   epidural    Placenta spont, intact, 3VC. Periurethral laceration repaired with simple figure of 8, 3-0 vicryl rapide    Pt and infant stable.       Chiquis Johnson MD   9:28 PM  04/15/22

## 2022-04-16 NOTE — PROGRESS NOTES
SBAR OUT Report: Mother    Verbal report given to [address] [place] [address] RN (full name & credentials) on this patient, who is now being transferred to MIU (unit) for routine progression of care. The patient is not wearing a green \"Anesthesia-Duramorph\" band. Report consisted of patient's Situation, Background, Assessment and Recommendations (SBAR). [place] ID bands were compared with the identification form, and verified with the patient and receiving nurse. Information from the SBAR, Intake/Output and MAR and the [place] Report was reviewed with the receiving nurse; opportunity for questions and clarification provided.     Fundal assessment performed with receiving RN

## 2022-04-16 NOTE — L&D DELIVERY NOTE
Delivery Summary    Patient: Shanita Wilson MRN: [id number]  SSN: [id number]    YOB: 1985  Age: 31 y.o. Sex: female       Information for the patient's :  Manual Burner, Girl Cherie Boston [[id number]]       Labor Events:    Labor: No    Steroids: None   Cervical Ripening Date/Time: 2022 10:03 PM   Cervical Ripening Type: Misoprostol   Antibiotics During Labor: Yes   Rupture Identifier:      Rupture Date/Time: 4/15/2022 1:40 PM   Rupture Type: AROM   Amniotic Fluid Volume:      Amniotic Fluid Description: Clear    Amniotic Fluid Odor: None    Induction: Prostaglandins       Induction Date/Time: 2022 10:03 PM    Indications for Induction: Chronic Hypertension;Diabetes    Augmentation: AROM; Oxytocin   Augmentation Date/Time:      Indications for Augmentation: Ineffective Contraction Pattern   Labor complications:          Additional complications:        Delivery Events:  Indications For Episiotomy:     Episiotomy: None   Perineal Laceration(s): None   Repaired:     Periurethral Laceration Location: right    Repaired: Yes   Labial Laceration Location:     Repaired:     Sulcal Laceration Location:     Repaired:     Vaginal Laceration Location:     Repaired:     Cervical Laceration Location:     Repaired:     Repair Suture: Rapide 3-0   Number of Repair Packets: 1   Estimated Blood Loss (ml):  ml   Quantitative Blood Loss (ml)                Delivery Date: 4/15/2022    Delivery Time: 9:16 PM  Delivery Type: Vaginal, Spontaneous  Sex:  Female    Gestational Age: 37w0d   Delivery Clinician:  Elana Leyden  Living Status: Living   Delivery Location: L&D            APGARS  One minute Five minutes Ten minutes   Skin color: 0   1        Heart rate: 2   2        Grimace: 2   2        Muscle tone: 2   2        Breathin   2        Totals: 8   9            Presentation: Vertex    Position: Left Occiput Anterior  Resuscitation Method:        Meconium Stained:        Cord Information: 3 Vessels  Complications: Nuchal Cord With Compressions  Cord around: head  Delayed cord clamping? Yes  Cord clamped date/time:   Disposition of Cord Blood:      Blood Gases Sent?: No    Placenta:  Date/Time: 4/15/2022  9:18 PM  Removal: Spontaneous      Appearance: Normal     [place] Measurements:  Birth Weight:        Birth Length:        Head Circumference:        Chest Circumference:       Abdominal Girth: Other Providers:   Brenden Millard;;;;;;;, Obstetrician;Primary Nurse;Primary  Nurse;Nicu Nurse;Neonatologist;Anesthesiologist;Crna;Nursery Nurse           Group B Strep:   Lab Results   Component Value Date/Time    GrBStrep, External Negative 2017 12:00 AM     Information for the patient's :  Deitra Needle, Girl Louis Alert [[id number]]   No results found for: ABORH, PCTABR, PCTDIG, BILI, ABORHEXT, ABORH     No results for input(s): PCO2CB, PO2CB, HCO3I, SO2I, IBD, PTEMPI, SPECTI, PHICB, ISITE, IDEV, IALLEN in the last 72 hours.

## 2022-04-16 NOTE — PROGRESS NOTES
[facility]  [address] [place] [address], [address]  [address], [place], Akosua Burgos, [facility]    Patient is S/P vaginal delivery at 42 weeks, IOL for [facility] with [facility]. No complaints today. Lochia < menses. No GI/ issues. No F/C.     VITALS  Patient Vitals for the past 24 hrs:   Temp Pulse Resp BP SpO2   04/16/22 0747 98.3 °F (36.8 °C) 72 18 115/63 97 %   04/16/22 0415 98 °F (36.7 °C) 76 18 137/70 97 %   04/16/22 0115 98.8 °F (37.1 °C) 85 18 134/63 96 %   04/15/22 2333 -- 91 -- 130/61 --   04/15/22 2318 -- 86 -- (!) 140/71 --   04/15/22 2249 -- 93 -- (!) 157/69 --   04/15/22 2233 -- 93 -- (!) 140/61 --   04/15/22 2218 -- 96 -- (!) 153/62 --   04/15/22 2203 -- 91 -- 137/67 --   04/15/22 2148 -- 100 -- (!) 142/72 --   04/15/22 2133 -- 99 -- 131/69 --   04/15/22 2048 -- (!) 114 -- 136/71 --   04/15/22 2034 -- 95 -- 136/63 --   04/15/22 2004 -- 96 -- 134/60 --   04/15/22 1949 -- 96 -- (!) 147/65 --   04/15/22 1933 -- 89 -- (!) 145/75 --   04/15/22 1911 98.5 °F (36.9 °C) -- -- -- --   04/15/22 1750 98.3 °F (36.8 °C) -- -- -- --   04/15/22 1746 -- 86 -- (!) 119/56 --   04/15/22 1715 -- 81 -- 122/64 --   04/15/22 1645 -- 88 -- (!) 105/52 --   04/15/22 1614 -- 93 -- (!) 121/59 --   04/15/22 1558 -- 86 -- 136/65 --   04/15/22 1553 98.4 °F (36.9 °C) -- -- -- --   04/15/22 1543 -- 86 -- (!) 148/73 --   04/15/22 1528 -- 79 -- (!) 151/68 --   04/15/22 1515 -- 83 -- (!) 144/66 --   04/15/22 1458 -- 86 -- 138/74 --   04/15/22 1444 -- (!) 105 -- 135/72 --   04/15/22 1430 -- (!) 113 -- 132/73 --   04/15/22 1414 -- 88 -- 136/78 --   04/15/22 1400 -- 93 -- 136/68 --   04/15/22 1353 98.2 °F (36.8 °C) -- -- -- --   04/15/22 1344 -- 92 -- (!) 160/75 --   04/15/22 1327 -- (!) 113 -- 136/74 --   04/15/22 1313 -- 81 -- (!) 149/71 --   04/15/22 1258 -- 77 -- (!) 149/70 --   04/15/22 1256 -- 82 -- (!) 147/68 --   04/15/22 1254 -- 78 -- (!) 150/70 --   04/15/22 1251 -- -- -- (!) 168/78 --   04/15/22 1010 -- 80 -- (!) 148/74 --        CV - RRR  LUNGS - CTA bilaterally  ABD - soft, approp tend, FF below umbilicus  EXT - tr edema bilaterally          Labs:    Recent Results (from the past 24 hour(s))   GLUCOSE, POC    Collection Time: 04/15/22 10:13 AM   Result Value Ref Range    Glucose (POC) 91 65 - 100 mg/dL    Performed by [employer] [place]    GLUCOSE, POC    Collection Time: 04/15/22  1:51 PM   Result Value Ref Range    Glucose (POC) 104 (H) 65 - 100 mg/dL    Performed by [facility]    GLUCOSE, POC    Collection Time: 04/15/22  3:52 PM   Result Value Ref Range    Glucose (POC) 93 65 - 100 mg/dL    Performed by [employer]    GLUCOSE, POC    Collection Time: 04/15/22  6:12 PM   Result Value Ref Range    Glucose (POC) 87 65 - 100 mg/dL    Performed by [employer]    GLUCOSE, POC    Collection Time: 04/15/22  8:30 PM   Result Value Ref Range    Glucose (POC) 93 65 - 100 mg/dL    Performed by [facility]          PPD #1      Pt is breast feeding. No issues or complaints today. Stable.   Routine PP care      Vanessa Fisher MD  9:34 AM  22

## 2022-04-16 NOTE — LACTATION NOTE
This note was copied from a baby's chart. Assisted with breastfeeding in football on L and R.  Baby fed fair. Can stay on, but does occasionally have to re-latch. Demonstrated manual lip flange. Encouraged frequent feeding and watch output. Mom reports baby has been nursing sicnce delivery. Reviewed first 24 hour expectations. Discussed feeding expectations in second day. Encouraged to try at breast, offer both sides and alternate starting side. Encouraged skin to skin. Reviewed Breastfeeding Packet.

## 2022-04-16 NOTE — PROGRESS NOTES
Safety Teaching reviewed:   1. Hand hygiene prior to handling the infant. 2. Use of bulb syringe  3. Bracelets with matching numbers are placed on mother and infant  3. An infant security tag  [facility]) is placed on the infant's ankle and monitored  5. All OB nurses wear pink Employee badges - do not give your baby to anyone without proper identification. 6. Never leave the baby alone in the room. 7. The infant should be placed on their back to sleep. on a firm mattress. No toys should be placed in the crib. (safe sleep video offered to view)  8. Never shake the baby (video offered to view)  9. Infant fall prevention - do not sleep with the baby, and place the baby in the crib while ambulating. 8. Mother and Baby Care booklet given to Mother. 11. Bulb syringe at bedside.

## 2022-04-16 NOTE — PROGRESS NOTES
SBAR IN Report: Mother    Verbal report received from La Carlton (full name & credentials) on this patient, who is now being transferred from L&D (unit) for routine progression of care. The patient is not wearing a green \"Anesthesia-Duramorph\" band. Report consisted of patient's Situation, Background, Assessment and Recommendations (SBAR). [place] ID bands were compared with the identification form, and verified with the patient and transferring nurse. Information from the SBAR and [employer] and the [employer] Report was reviewed with the transferring nurse; opportunity for questions and clarification provided.

## 2022-04-17 VITALS
TEMPERATURE: 97.8 F | DIASTOLIC BLOOD PRESSURE: 62 MMHG | SYSTOLIC BLOOD PRESSURE: 133 MMHG | HEART RATE: 73 BPM | RESPIRATION RATE: 17 BRPM | OXYGEN SATURATION: 97 %

## 2022-04-17 LAB
GLUCOSE BLD STRIP.AUTO-MCNC: 83 MG/DL (ref 65–100)
SERVICE CMNT-IMP: NORMAL

## 2022-04-17 PROCEDURE — 82962 GLUCOSE BLOOD TEST: CPT

## 2022-04-17 PROCEDURE — 74011250637 HC RX REV CODE- 250/637: Performed by: OBSTETRICS & GYNECOLOGY

## 2022-04-17 RX ORDER — FUROSEMIDE 20 MG/1
20 TABLET ORAL DAILY
Qty: 4 TABLET | Refills: 0 | Status: SHIPPED | OUTPATIENT
Start: 2022-04-17 | End: 2022-04-21 | Stop reason: SDUPTHER

## 2022-04-17 RX ORDER — IBUPROFEN 600 MG/1
600 TABLET ORAL
Qty: 60 TABLET | Refills: 0 | Status: SHIPPED | OUTPATIENT
Start: 2022-04-17

## 2022-04-17 RX ADMIN — IBUPROFEN 600 MG: 600 TABLET, FILM COATED ORAL at 04:41

## 2022-04-17 RX ADMIN — FUROSEMIDE 20 MG: 20 TABLET ORAL at 08:19

## 2022-04-17 RX ADMIN — ACETAMINOPHEN 325 MG: 325 TABLET, FILM COATED ORAL at 08:19

## 2022-04-17 NOTE — PROGRESS NOTES
[facility]  [address], [address], [address]  [address] [facility], [place], Chirag Garay, [facility]    Patient is S/P vaginal delivery at 37 weeks, IOL for gest HTN with [facility]. No complaints today. Lochia < menses. No GI/ issues. No F/C. VITALS  Patient Vitals for the past 24 hrs:   Temp Pulse Resp BP SpO2   22 0733 97.7 °F (36.5 °C) 68 17 137/65 96 %   22 0449 97.4 °F (36.3 °C) 74 17 (!) 148/77 96 %   22 2318 97.6 °F (36.4 °C) 77 18 136/69 97 %   22 1954 98.4 °F (36.9 °C) 84 17 131/75 96 %   22 1459 98.4 °F (36.9 °C) 80 18 131/64 97 %   22 1100 98.4 °F (36.9 °C) 74 18 122/60 97 %        CV - RRR  LUNGS - CTA bilaterally  ABD - soft, approp tend, FF below umbilicus  EXT - tr edema bilaterally          Labs:    Recent Results (from the past 24 hour(s))   GLUCOSE, POC    Collection Time: 22  6:05 AM   Result Value Ref Range    Glucose (POC) 83 65 - 100 mg/dL    Performed by [facility]          PPD #2      Pt is breast feeding. No issues or complaints today. Stable.   Routine PP instructions      Luis Zhao MD  9:41 AM  22

## 2022-04-17 NOTE — DISCHARGE INSTRUCTIONS
Patient Education        After Your Delivery (the Postpartum Period): Care Instructions  Overview     Congratulations on the birth of your baby. Like pregnancy, the  period can be a time of excitement, vero, and exhaustion. You may look at your wondrous little baby and feel happy. You may also be overwhelmed by your new sleep hours and new responsibilities. At first, babies often sleep during the days and are awake at night. They do not have a pattern or routine. They may make sudden gasps, jerk themselves awake, or look like they have crossed eyes. These are all normal, and they may even make you smile. In these first weeks after delivery, try to take good care of yourself. It may take 4 to 6 weeks to feel like yourself again, and possibly longer if you had a  birth. You will likely feel very tired for several weeks. Your days will be full of ups and downs, but lots of vero as well. Follow-up care is a key part of your treatment and safety. Be sure to make and go to all appointments, and call your doctor if you are having problems. It's also a good idea to know your test results and keep a list of the medicines you take. How can you care for yourself at home? Take care of your body after delivery  · Use pads instead of tampons for the bloody flow that may last as long as 2 weeks. · Ease cramps with ibuprofen (Advil, Motrin). · Ease soreness of hemorrhoids and the area between your vagina and rectum with ice compresses or witch hazel pads. · Ease constipation by drinking lots of fluid and eating high-fiber foods. Ask your doctor about over-the-counter stool softeners. · Cleanse yourself with a gentle squeeze of warm water from a bottle instead of wiping with toilet paper. · Take a sitz bath in warm water several times a day. · Wear a good nursing bra. Ease sore and swollen breasts with warm, wet washcloths. · If you aren't breastfeeding, use ice rather than heat for breast soreness.   · Your period may not start for several months if you are breastfeeding. You may bleed more, and longer at first, than you did before you got pregnant. · Wait until you are healed (about 4 to 6 weeks) before you have sex. Ask your doctor when it is okay for you to have sex. · Try not to travel with your baby for 5 or 6 weeks. If you take a long car trip, make frequent stops to walk around and stretch. Avoid exhaustion  · Rest every day. Try to nap when your baby naps. · Ask another adult to be with you for a few days after delivery. · Plan for  if you have other children. · Stay flexible so you can eat at odd hours and sleep when you need to. Both you and your baby are making new schedules. · Plan small trips to get out of the house. Change can make you feel less tired. · Ask for help with housework, cooking, and shopping. Remind yourself that your job is to care for your baby. Know about help for postpartum depression  · \"Baby blues\" are common for the first 1 to 2 weeks after birth. You may cry or feel sad or irritable for no reason. · Rest whenever you can. Being tired makes it harder to handle your emotions. · Go for walks with your baby. · Talk to your partner, friends, and family about your feelings. · If your symptoms last for more than a few weeks, or if you feel very depressed, ask your doctor for help. · Postpartum depression can be treated. Support groups and counseling can help. Sometimes medicine can also help. Stay healthy  · Eat healthy foods so you have more energy. · If you breastfeed, avoid drugs. If you quit smoking during pregnancy, try to stay smoke-free. If you choose to have a drink now and then, have only one drink, and limit the number of occasions that you have a drink. Wait to breastfeed at least 2 hours after you have a drink to reduce the amount of alcohol the baby may get in the milk. · Start daily exercise after 4 to 6 weeks, but rest when you feel tired.   · Learn exercises to tone your belly. Do Kegel exercises to regain strength in your pelvic muscles. You can do these exercises while you stand or sit. ? Squeeze the same muscles you would use to stop your urine. Your belly and thighs should not move. ? Hold the squeeze for 3 seconds, and then relax for 3 seconds. ? Start with 3 seconds. Then add 1 second each week until you are able to squeeze for 10 seconds. ? Repeat the exercise 10 to 15 times for each session. Do three or more sessions each day. · Find a class for you and your baby that has an exercise time. · If you had a  birth, give yourself a bit more time before you exercise, and be careful. When should you call for help? Share this information with your partner, family, or a friend. They can help you watch for warning signs. Call 911  anytime you think you may need emergency care. For example, call if:    · You have thoughts of harming yourself, your baby, or another person.     · You passed out (lost consciousness).     · You have chest pain, are short of breath, or cough up blood.     · You have a seizure. Call your doctor now or seek immediate medical care if:    · You have signs of hemorrhage (too much bleeding), such as:  ? Heavy vaginal bleeding. This means that you are soaking through one or more pads in an hour. Or you pass blood clots bigger than an egg. ? Feeling dizzy or lightheaded, or you feel like you may faint. ? Feeling so tired or weak that you cannot do your usual activities. ? A fast or irregular heartbeat. ? New or worse belly pain.     · You have signs of infection, such as:  ? A fever. ? Vaginal discharge that smells bad.  ? New or worse belly pain.     · You have symptoms of a blood clot in your leg (called a deep vein thrombosis), such as:  ? Pain in the calf, back of the knee, thigh, or groin. ?  Redness and swelling in your leg or groin.     · You have signs of preeclampsia, such as:  ? Sudden swelling of your face, hands, or feet. ? New vision problems (such as dimness, blurring, or seeing spots). ? A severe headache. Watch closely for changes in your health, and be sure to contact your doctor if:    · Your vaginal bleeding isn't decreasing.     · You feel sad, anxious, or hopeless for more than a few days.     · You are having problems with your breasts or breastfeeding. Where can you learn more? Go to http://www.cervantes.com/  Enter A461 in the search box to learn more about \"After Your Delivery (the Postpartum Period): Care Instructions. \"  Current as of: June 16, 2021               Content Version: 13.2  © 7819-4125 [employer]. Care instructions adapted under license by [employer] (which disclaims liability or warranty for this information). If you have questions about a medical condition or this instruction, always ask your healthcare professional. [place] [address] any warranty or liability for your use of this information.

## 2022-04-17 NOTE — LACTATION NOTE
This note was copied from a baby's chart. Individualized Feeding Plan for Breastfeeding   Lactation Services [phone]      As much as possible, hold your baby on your chest so babys bare skin is against your bare skin with a blanket covering babys back, especially 30 minutes before it is time for baby to eat. Watch for early feeding cues such as, licking lips, sucking motions, rooting, hands to mouth. Crying is a late feeding cue. Feed your baby at least 8 times in 24 hours, or more if your baby is showing feeding cues. If baby is sleepy put baby skin to skin and watch for hunger cues. To rouse baby: unwrap, undress, massage hands, feet, & back, change diaper, gently change babys position from lying to sitting. 15-20 minutes on the first breast of active breastfeeding is considered a good feeding. Good, active breastfeeding is when baby is alert, tugging the nipple, their ear may move, and you can hear swallows. Allow baby to finish the first side before changing sides. Sleeping at the breast or only brief, light sucks should not be considered a good, full breastfeed. At each feeding:  __x__1. Do Suck Practice on finger before each feeding until sucking pattern is smooth. Try using index finger. Nail down towards tongue. __x__2. Hand Express for a few minutes prior to latching to help start milk flow. __x__3. Baby needs to NURSE WELL x 15-20 minutes on at least first breast, burp and offer 2nd breast at every feeding. If no sustained latch only attempt at breast for 10 minutes. If baby does not latch on and feed well on at least one side, you should:   __x__4. Double pump for 15 minutes with breast massage and compression. Hand express for an additional 2-3 minutes per side. Pump after each feeding attempt or poor feeding, up to 8 times per day. If you are not putting baby to the breast you need to pump 8 times a day. Pump every 3 hours. __x__5.  Give baby all of the breast milk you obtain using a straight syringe or  curved syringe. If baby does NOT have enough wet and dirty diapers per day, is jaundiced/lethargic, or has significant weight loss AND you do NOT pump enough milk for each feeding (per volume listed below), formula supplementation may need to be used. Call lactation department /pediatrician if you have concerns. AVERAGE INTAKES OF COLOSTRUM BY HEALTHY  INFANTS:  Time  Day Intake (ml per feeding)  Based on 8 feedings per day. 1st 24 hrs  1 2-10 ml  24-48 hrs  2 5-15 ml  48-72 hrs  3 15-30 ml (0.5-1 oz)  72-96 hrs  4 30-45 ml (1-1.5oz)                          5-6      45-60 ml (1.5-2oz)                           7          At least 60 ml (2 oz)    By day 7, baby will need at least 60 ml or 2 oz at each feeding based on 8 feedings per day & babys weight. (1oz = 30ml). Total milk volume needed in 24 hours by Day 7 is 16-18 oz per day based on baby's birthweight of 6-13. The more often baby eats, the less volume they need per feeding. If baby is eating more often than the minimum of 8 times per day, they may take less per feeding. Comments: Suggested mom start insurance pumping. Encouraged to double pump after most daytime feedings for 10 minutes to help milk come in.  Give all colostrum in a straight or curved tip syringe. If pumping, suggest using olive oil or coconut oil on your nipples before pumping to help reduce the friction. Use feeding plan until follow up with pediatrician. Continue to attempt at the breast for most feeds. Pump every 3 hours if no latch. Give all pumped colostrum/breastmilk at each feeding. OUTPATIENT APPOINTMENT Suggested. Outpatient services are located on the 4th floor at [facility]. Check in at the 4th floor registration desk (the same one you used when you came to have your baby).   Call for questions [phone]

## 2022-04-17 NOTE — PROGRESS NOTES
Problem: Vaginal Delivery: Postpartum 2  Goal: Activity/Safety  Outcome: Progressing Towards Goal  Goal: Nutrition/Diet  Outcome: Progressing Towards Goal  Goal: Medications  Outcome: Progressing Towards Goal  Goal: Psychosocial  Outcome: Progressing Towards Goal

## 2022-04-18 NOTE — PROGRESS NOTES
Patient not seen by  prior to discharge. Informational packet on  mood & anxiety disorders (resources/education) mailed to home address.  contact information included.     VEDA Barreto, [address] [facility]   [phone]

## 2022-04-20 ENCOUNTER — HOSPITAL ENCOUNTER (OUTPATIENT)
Age: 37
Discharge: HOME | End: 2022-04-20
Attending: OBSTETRICS & GYNECOLOGY | Admitting: OBSTETRICS & GYNECOLOGY

## 2022-04-20 VITALS
OXYGEN SATURATION: 97 % | TEMPERATURE: 98.1 F | DIASTOLIC BLOOD PRESSURE: 67 MMHG | RESPIRATION RATE: 18 BRPM | SYSTOLIC BLOOD PRESSURE: 135 MMHG | HEART RATE: 97 BPM

## 2022-04-20 LAB
ALBUMIN SERPL-MCNC: 2.8 G/DL (ref 3.5–5)
ALBUMIN/GLOB SERPL: 0.7 {RATIO} (ref 1.2–3.5)
ALP SERPL-CCNC: 73 U/L (ref 50–130)
ALT SERPL-CCNC: 26 U/L (ref 12–65)
ANION GAP SERPL CALC-SCNC: 6 MMOL/L (ref 7–16)
AST SERPL-CCNC: 15 U/L (ref 15–37)
BASOPHILS # BLD: 0 K/UL (ref 0–0.2)
BASOPHILS NFR BLD: 0 % (ref 0–2)
BILIRUB SERPL-MCNC: 0.2 MG/DL (ref 0.2–1.1)
BUN SERPL-MCNC: 8 MG/DL (ref 6–23)
CALCIUM SERPL-MCNC: 9.2 MG/DL (ref 8.3–10.4)
CHLORIDE SERPL-SCNC: 107 MMOL/L (ref 98–107)
CO2 SERPL-SCNC: 26 MMOL/L (ref 21–32)
CREAT SERPL-MCNC: 0.49 MG/DL (ref 0.6–1)
CREAT UR-MCNC: 27 MG/DL
DIFFERENTIAL METHOD BLD: ABNORMAL
EOSINOPHIL # BLD: 0.1 K/UL (ref 0–0.8)
EOSINOPHIL NFR BLD: 2 % (ref 0.5–7.8)
ERYTHROCYTE [DISTWIDTH] IN BLOOD BY AUTOMATED COUNT: 12.2 % (ref 11.9–14.6)
GLOBULIN SER CALC-MCNC: 4.3 G/DL (ref 2.3–3.5)
GLUCOSE SERPL-MCNC: 94 MG/DL (ref 65–100)
HCT VFR BLD AUTO: 38.6 % (ref 35.8–46.3)
HGB BLD-MCNC: 13.2 G/DL (ref 11.7–15.4)
IMM GRANULOCYTES # BLD AUTO: 0 K/UL (ref 0–0.5)
IMM GRANULOCYTES NFR BLD AUTO: 0 % (ref 0–5)
LDH SERPL L TO P-CCNC: 203 U/L (ref 100–190)
LYMPHOCYTES # BLD: 2.3 K/UL (ref 0.5–4.6)
LYMPHOCYTES NFR BLD: 26 % (ref 13–44)
MCH RBC QN AUTO: 29.5 PG (ref 26.1–32.9)
MCHC RBC AUTO-ENTMCNC: 34.2 G/DL (ref 31.4–35)
MCV RBC AUTO: 86.4 FL (ref 79.6–97.8)
MONOCYTES # BLD: 0.4 K/UL (ref 0.1–1.3)
MONOCYTES NFR BLD: 4 % (ref 4–12)
NEUTS SEG # BLD: 5.8 K/UL (ref 1.7–8.2)
NEUTS SEG NFR BLD: 67 % (ref 43–78)
NRBC # BLD: 0 K/UL (ref 0–0.2)
PLATELET # BLD AUTO: 279 K/UL (ref 150–450)
PMV BLD AUTO: 8.6 FL (ref 9.4–12.3)
POTASSIUM SERPL-SCNC: 3.8 MMOL/L (ref 3.5–5.1)
PROT SERPL-MCNC: 7.1 G/DL (ref 6.3–8.2)
PROT UR-MCNC: 17 MG/DL
PROT/CREAT UR-RTO: 0.6
RBC # BLD AUTO: 4.47 M/UL (ref 4.05–5.2)
SODIUM SERPL-SCNC: 139 MMOL/L (ref 136–145)
URATE SERPL-MCNC: 5.4 MG/DL (ref 2.6–6)
WBC # BLD AUTO: 8.7 K/UL (ref 4.3–11.1)

## 2022-04-20 PROCEDURE — 99283 EMERGENCY DEPT VISIT LOW MDM: CPT

## 2022-04-20 PROCEDURE — 84550 ASSAY OF BLOOD/URIC ACID: CPT

## 2022-04-20 PROCEDURE — 75810000275 HC EMERGENCY DEPT VISIT NO LEVEL OF CARE

## 2022-04-20 PROCEDURE — 74011250637 HC RX REV CODE- 250/637: Performed by: OBSTETRICS & GYNECOLOGY

## 2022-04-20 PROCEDURE — 80053 COMPREHEN METABOLIC PANEL: CPT

## 2022-04-20 PROCEDURE — 83615 LACTATE (LD) (LDH) ENZYME: CPT

## 2022-04-20 PROCEDURE — 85025 COMPLETE CBC W/AUTO DIFF WBC: CPT

## 2022-04-20 PROCEDURE — 84156 ASSAY OF PROTEIN URINE: CPT

## 2022-04-20 RX ORDER — NIFEDIPINE 30 MG/1
30 TABLET, FILM COATED, EXTENDED RELEASE ORAL DAILY
Qty: 30 TABLET | Refills: 0 | Status: SHIPPED | OUTPATIENT
Start: 2022-04-20 | End: 2022-05-20

## 2022-04-20 RX ORDER — ACETAMINOPHEN 500 MG
1000 TABLET ORAL ONCE
Status: COMPLETED | OUTPATIENT
Start: 2022-04-20 | End: 2022-04-20

## 2022-04-20 RX ORDER — SODIUM CHLORIDE 0.9 % (FLUSH) 0.9 %
5-40 SYRINGE (ML) INJECTION AS NEEDED
Status: DISCONTINUED | OUTPATIENT
Start: 2022-04-20 | End: 2022-04-20 | Stop reason: HOSPADM

## 2022-04-20 RX ORDER — NIFEDIPINE 10 MG/1
20 CAPSULE ORAL
Status: DISCONTINUED | OUTPATIENT
Start: 2022-04-20 | End: 2022-04-20 | Stop reason: HOSPADM

## 2022-04-20 RX ORDER — NIFEDIPINE 10 MG/1
10 CAPSULE ORAL
Status: COMPLETED | OUTPATIENT
Start: 2022-04-20 | End: 2022-04-20

## 2022-04-20 RX ORDER — LABETALOL HYDROCHLORIDE 5 MG/ML
20 INJECTION, SOLUTION INTRAVENOUS
Status: DISCONTINUED | OUTPATIENT
Start: 2022-04-20 | End: 2022-04-20 | Stop reason: HOSPADM

## 2022-04-20 RX ORDER — SODIUM CHLORIDE 0.9 % (FLUSH) 0.9 %
5-40 SYRINGE (ML) INJECTION EVERY 8 HOURS
Status: DISCONTINUED | OUTPATIENT
Start: 2022-04-20 | End: 2022-04-20 | Stop reason: HOSPADM

## 2022-04-20 RX ADMIN — NIFEDIPINE 10 MG: 10 CAPSULE ORAL at 13:09

## 2022-04-20 RX ADMIN — NIFEDIPINE 20 MG: 10 CAPSULE ORAL at 13:37

## 2022-04-20 RX ADMIN — ACETAMINOPHEN 1000 MG: 500 TABLET, FILM COATED ORAL at 13:09

## 2022-04-20 NOTE — PROGRESS NOTES
31year old  postpartum patient presents to [facility] via ambulatory with c/o elevated blood pressure, headache and light headedness. Denies epigastric pain or blurry vision.  Dr. Pepper Blinks notified of pt arrival.

## 2022-04-20 NOTE — H&P
History & Physical    Name: Frandy Quiles MRN: [id number]  SSN: [id number]    YOB: 1985  Age: 31 y.o. Sex: female        Subjective: Headache and elevated BP  32 yo  presents PPD 5 s/p  at 37 wks following IOL for GHTN/PALOMA not on meds. Pregnancy was also c/b GDM on insulin, obesity, Covid 19 2nd trimester, uterine fibroids. She c/o frontal headache today and elevated BP. She was discharged with Lasix 20mg x 5 days and has not taken her dose today. She denies visual changes or epigastric pain. This morning was stressful for her because she had a peds appt due to elevated bilirubin levels and lactation appt. Estimated Date of Delivery: 22  OB History    Para Term  AB Living   3 2 2 0 1 2   SAB IAB Ectopic Molar Multiple Live Births   1 0 0   0 2      # Outcome Date GA Lbr Ilya/2nd Weight Sex Delivery Anes PTL Lv   3 Term 04/15/22 37w0d 22:39 / 00:34 3.1 kg F Vag-Spont EPI N CHRISTAL   2 Term 17 37w2d 58:00 / 02:31 2.555 kg M Vag-Spont EPIDURAL AN N CHRISTAL   1 SAB 13 6w0d             Birth Comments: No D&C done       Past Medical History:   Diagnosis Date    Asthma     prn inhaler    Cholelithiasis with cholecystitis 2015    GERD (gastroesophageal reflux disease)     controlled with medication    Gestational diabetes 2017    Gestational hypertension     History of miscarriage 2014    Infertility, female     Morbid obesity ([address]     bmi =51    Nausea & vomiting     with every surg--3-4 times-- then ok per pt    Polycystic disease, ovaries     Pre-existing type 2 diabetes mellitus in pregnancy in third trimester 2017 at [facility]:  Appropriate fetal growth, reassuring fetal status. AC 17%, overall 22%, ED 17.8 cm, UA Dopplers WNL, BPP 8/. Glucose log reviewed. Ranges from 77 to 164. Several PP's elevated. Reviewed diet and suggested increase in protein. Loosen PP parameters <140.  Current Dose is now Levemir 16/16 units and Humalog . Follow up at [facility] in 3 weeks for fetal growth and BPP and     Pregnancy induced hypertension, antepartum 2017    Psychiatric disorder     anxiety-- per pt-- no tx    PUD (peptic ulcer disease)     no problems since    Severe pre-eclampsia 2017    IOL at 37wks w/ PreE w/OUT severe features; developed several severe bps on L&D and recurrent severe bps pp; no Mag in labor--will hold for now. Start Procardia 30 XL BID.  Sleep apnea     ?--with  surg at [facility]--- per pt never has been tested     Past Surgical History:   Procedure Laterality Date    HX CHOLECYSTECTOMY  2015    Dr Chinyere Dominguez       [address] EXTRACTION       Social History     Occupational History    Not on file   Tobacco Use    Smoking status: Former Smoker     Packs/day: 1.00     Years: 9.00     Pack years: 9.00     Quit date: 2021     Years since quittin.6    Smokeless tobacco: Never Used   Vaping Use    Vaping Use: Never used   Substance and Sexual Activity    Alcohol use: No    Drug use: No     Types: Marijuana     Comment: last time used- 2015    Sexual activity: Yes     Partners: Male     Birth control/protection: None     Family History   Problem Relation Age of Onset    Hypertension Mother     Migraines Mother     Cancer Mother         cervical    Elevated Lipids Father     Breast Cancer Neg Hx     Colon Cancer Neg Hx     Ovarian Cancer Neg Hx        Allergies   Allergen Reactions    Latex Rash and Itching    Tree Nut Anaphylaxis    Codeine Nausea and Vomiting    Sulfa (Sulfonamide Antibiotics) Nausea and Vomiting    Naproxen Other (comments)     Can not take due to history peptic ulcers     Prior to Admission medications    Medication Sig Start Date End Date Taking? Authorizing Provider   furosemide (LASIX) 20 mg tablet Take 1 Tablet by mouth daily.  22  Yes Baron Golden MD   ibuprofen (MOTRIN) 600 mg tablet Take 1 Tablet by mouth every six (6) hours as needed for Pain. 22  Yes Leeanne Brock MD   PNV DO.76/AGHWFTF fum/folic ac (PRENATAL PO) Take  by mouth. Yes Provider, Historical   famotidine (Pepcid) 20 mg tablet Take 20 mg by mouth two (2) times a day. Yes Provider, Historical        Review of Systems: Pertinent items are noted in HPI. 10 point ROS is otherwise negative. Objective:     Vitals:  Vitals:    22 1231 22 1233 22 1237   BP:  (!) 163/85    Pulse:  75    Resp:   18   Temp:   98.1 °F (36.7 °C)   SpO2: 97%          Physical Exam:  Patient without distress. Heart: Regular rate and rhythm  Lung: clear to auscultation throughout lung fields and normal respiratory effort  Abdomen: soft, nontender  Lower Extremities:  - Edema 1+  Neuro: 2+ DTR, no clonus    Prenatal Labs:   Lab Results   Component Value Date/Time    ABO/Rh(D) A POSITIVE 2022 08:37 PM    Rubella, External Non Immune 2017 12:00 AM    GrBStrep, External Negative 2017 12:00 AM    RPR, External Negative 2017 12:00 AM    ABO,Rh A Positive 2017 12:00 AM         Assessment/Plan: 32 yo  presents PPD with headache and elevated BP. Active Problems:    Postpartum hypertension (2022)         Plan:   Give Procardia per hypertensive protocol. Tylenol prn  CBC, CMP, LDH, uric acid, urine P/C ratio. ADDENDUM:   Headache resolved after normalization of BP. Results for Rose Shelton (MRN [id number]) as of 2022 14:50   Ref.  Range 2022 13:47   WBC Latest Ref Range: 4.3 - 11.1 K/uL 8.7   RBC Latest Ref Range: 4.05 - 5.2 M/uL 4.47   HGB Latest Ref Range: 11.7 - 15.4 g/dL 13.2   HCT Latest Ref Range: 35.8 - 46.3 % 38.6   MCV Latest Ref Range: 79.6 - 97.8 FL 86.4   MCH Latest Ref Range: 26.1 - 32.9 PG 29.5   MCHC Latest Ref Range: 31.4 - 35.0 g/dL 34.2   RDW Latest Ref Range: 11.9 - 14.6 % 12.2   PLATELET Latest Ref Range: 150 - 450 K/uL 279   MPV Latest Ref Range: 9.4 - 12.3 FL 8.6 (L)   NEUTROPHILS Latest Ref Range: 43 - 78 % 67   LYMPHOCYTES Latest Ref Range: 13 - 44 % 26   MONOCYTES Latest Ref Range: 4.0 - 12.0 % 4   EOSINOPHILS Latest Ref Range: 0.5 - 7.8 % 2   BASOPHILS Latest Ref Range: 0.0 - 2.0 % 0   IMMATURE GRANULOCYTES Latest Ref Range: 0.0 - 5.0 % 0   DF Latest Units:   AUTOMATED   ABSOLUTE NRBC Latest Ref Range: 0.0 - 0.2 K/uL 0.00   ABS. NEUTROPHILS Latest Ref Range: 1.7 - 8.2 K/UL 5.8   ABS. IMM. GRANS. Latest Ref Range: 0.0 - 0.5 K/UL 0.0   ABS. LYMPHOCYTES Latest Ref Range: 0.5 - 4.6 K/UL 2.3   ABS. MONOCYTES Latest Ref Range: 0.1 - 1.3 K/UL 0.4   ABS. EOSINOPHILS Latest Ref Range: 0.0 - 0.8 K/UL 0.1   ABS. BASOPHILS Latest Ref Range: 0.0 - 0.2 K/UL 0.0   Sodium Latest Ref Range: 136 - 145 mmol/L 139   Potassium Latest Ref Range: 3.5 - 5.1 mmol/L 3.8   Chloride Latest Ref Range: 98 - 107 mmol/L 107   CO2 Latest Ref Range: 21 - 32 mmol/L 26   Anion gap Latest Ref Range: 7 - 16 mmol/L 6 (L)   Glucose Latest Ref Range: 65 - 100 mg/dL 94   BUN Latest Ref Range: 6 - 23 MG/DL 8   Creatinine Latest Ref Range: 0.6 - 1.0 MG/DL 0.49 (L)   Calcium Latest Ref Range: 8.3 - 10.4 MG/DL 9.2   GFR est non-AA Latest Ref Range: >60 ml/min/1.73m2 >60   GFR est AA Latest Ref Range: >60 ml/min/1.73m2 >60   Bilirubin, total Latest Ref Range: 0.2 - 1.1 MG/DL 0.2   Protein, total Latest Ref Range: 6.3 - 8.2 g/dL 7.1   Albumin Latest Ref Range: 3.5 - 5.0 g/dL 2.8 (L)   Globulin Latest Ref Range: 2.3 - 3.5 g/dL 4.3 (H)   A-G Ratio Latest Ref Range: 1.2 - 3.5   0.7 (L)   ALT Latest Ref Range: 12 - 65 U/L 26   AST Latest Ref Range: 15 - 37 U/L 15   Alk. phosphatase Latest Ref Range: 50 - 130 U/L 73   LD Latest Ref Range: 100 - 190 U/L 203 (H)   Uric acid Latest Ref Range: 2.6 - 6.0 MG/DL 5.4   Creatinine, urine Latest Units: mg/dL 27.00   Protein, urine random Latest Units: mg/dL 17   Protein/Creat.  urine Ratio Latest Units:   0.6   PROTEIN/CREATININE RATIO, URINE Unknown Rpt Continue Lasix 20mg po daily to complete 5 days course. Start Procardia XL 30mg tonight at 2100. See Dr. Chitra Samuels tomorrow for BP check. S/s of preeclampsia reviewed. Return tonight for severe range BP or any other concerning signs or symptoms. Case d/w Dr. Chitra Samuels.

## 2022-04-20 NOTE — DISCHARGE INSTRUCTIONS
Patient Education        Learning About Preeclampsia After Childbirth  What is preeclampsia? A woman with preeclampsia has blood pressure that is higher than usual. She may also have other serious symptoms. Preeclampsia can be dangerous. When it is severe, it can cause seizures (eclampsia) or liver or kidney damage. When the liver is affected, some women get HELLP syndrome, a blood-clotting and bleeding problem. HELLP can come on quickly and can be deadly. This is why your doctor checks you and your baby often. Preeclampsia usually occurs after 20 weeks of pregnancy. Most often, it starts near the end of pregnancy and goes away after childbirth. But symptoms may last a few weeks or more and can get worse after delivery. Rarely, symptoms of preeclampsia don't show up until days or even weeks after childbirth. What are the symptoms? Mild preeclampsia usually doesn't cause symptoms. But preeclampsia can cause rapid weight gain and sudden swelling of the hands and face. Severe preeclampsia does cause symptoms. It can cause a very bad headache and trouble seeing and breathing. It also can cause belly pain. You may also urinate less than usual.  What can you expect after you have had preeclampsia? In the hospital  After the baby and the placenta are delivered, preeclampsia usually starts to improve. Most women get better in the first few days after childbirth. After having preeclampsia, you still have a risk of seizures for a day or more after childbirth. (Very rarely, seizures happen later on.) So your doctor may have you take magnesium sulfate for a day or more to prevent seizures. You may also take medicine to lower your blood pressure. When you go home  Your blood pressure will most likely return to normal a few days after delivery. Your doctor will want to check your blood pressure sometime in the first week after you leave the hospital.  Some women still have high blood pressure 6 weeks after childbirth. But most return to normal levels over the long term. · Take and record your blood pressure at home if your doctor tells you to. ? Ask your doctor to check your blood pressure monitor to be sure that it is accurate and that the cuff fits you. Also ask your doctor to watch you use it, to make sure that you are using it right. ? You should not eat, use tobacco products, or use medicine known to raise blood pressure (such as some nasal decongestant sprays) before you take your blood pressure. ? Avoid taking your blood pressure if you have just exercised. Also avoid taking it if you are nervous or upset. Rest at least 15 minutes before you take your blood pressure. · Be safe with medicines. If you take medicine, take it exactly as prescribed. Call your doctor if you think you are having a problem with your medicine. · Do not smoke. Quitting smoking will help improve your baby's growth and health. If you need help quitting, talk to your doctor about stop-smoking programs and medicines. These can increase your chances of quitting for good. · Eat a balanced and healthy diet that has lots of fruits and vegetables. Long-term health  After you have had preeclampsia, you have a higher-than-average risk of heart disease, stroke, and kidney disease. This may be because the same things that cause preeclampsia also cause heart and kidney disease. To protect your health, work with your doctor on living a heart-healthy lifestyle and getting the checkups you need. Your doctor may also want you to check your blood pressure at home. Follow-up care is a key part of your treatment and safety. Be sure to make and go to all appointments, and call your doctor if you are having problems. It's also a good idea to know your test results and keep a list of the medicines you take. When should you call for help? Share this information with your partner or a friend. They can help you watch for warning signs.   Call 911  anytime you think you may need emergency care. For example, call if:    · You passed out (lost consciousness).     · You have a seizure. Call your doctor now or seek immediate medical care if:    · You have symptoms of preeclampsia, such as:  ? Sudden swelling of your face, hands, or feet. ? New vision problems (such as dimness, blurring, or seeing spots). ? A severe headache.     · Your blood pressure is very high, such as 160/110 or higher.     · Your blood pressure is higher than your doctor told you it should be, or it rises quickly.     · You have new nausea or vomiting.     · You have pain in your belly or pelvis. Watch closely for changes in your health, and be sure to contact your doctor if:    · You gain weight rapidly. Where can you learn more? Go to http://www.cervantes.com/  Enter Q718 in the search box to learn more about \"Learning About Preeclampsia After Childbirth. \"  Current as of: June 16, 2021               Content Version: 13.2  © 2006-2022 [employer]wise[employer] Inc[employer]. Care instructions adapted under license by [employer] (which disclaims liability or warranty for this information). If you have questions about a medical condition or this instruction, always ask your healthcare professional. [place] [address] any warranty or liability for your use of this information.

## 2022-04-20 NOTE — PROGRESS NOTES
Pt states improvement in headache after receiving medication. D/c home per Dr. Quiñones Nurse order. Written and verbal d/c instructions and education given on signs and symptoms of preeclampsia in the postpartum patient. Pt denies any questions and verbalizes understanding. Pt denies need for wheelchair, ambulated out of [place] with significant other.

## 2022-04-20 NOTE — DISCHARGE SUMMARY
[place] [address], [address]  [address], [place], Agata Carr, [facility]  Date of Admission:  2022  7:59 PM  Date of Discharge:  2022 11:11 AM    Encounter Diagnoses   Name Primary?  Normal delivery at term Yes        Chloé Lui 31 y.o. G3  presented at 37w0d for induction for [facility] in preg with PALOMA. Pt had  without incident. See delivery note for all delivery details. Pt's PP course was uneventful. On day of D/C, she was ambulating well, afebrile, with lochia < menses. She was discharged home with medications as below. Pt was breast feeding on discharge. Routine PP instructions given to patient. She is to follow up with [address] in one week for BP check and in 6 weeks for PP exam.    Discharge Medication List as of 2022 10:19 AM      START taking these medications    Details   furosemide (LASIX) 20 mg tablet Take 1 Tablet by mouth daily. , Normal, Disp-4 Tablet, R-0      ibuprofen (MOTRIN) 600 mg tablet Take 1 Tablet by mouth every six (6) hours as needed for Pain., Normal, Disp-60 Tablet, R-0         CONTINUE these medications which have NOT CHANGED    Details   PNV CG.71/YZZPORE fum/folic ac (PRENATAL PO) Take  by mouth., Historical Med      famotidine (Pepcid) 20 mg tablet Take 20 mg by mouth two (2) times a day., Historical Med         STOP taking these medications       insulin glargine (LANTUS) 100 unit/mL injection Comments:   Reason for Stopping:         calcium carbonate (TUMS) 200 mg calcium (500 mg) chew Comments:   Reason for Stopping:         metFORMIN (GLUCOPHAGE) 500 mg tablet Comments:   Reason for Stopping:         BABY ASPIRIN PO Comments:   Reason for Stopping:         acetaminophen (TYLENOL) 325 mg tablet Comments:   Reason for Stopping:               Bre Mota MD  10:14 AM  22

## 2022-06-02 ENCOUNTER — POSTPARTUM VISIT (OUTPATIENT)
Dept: OBGYN CLINIC | Age: 37
End: 2022-06-02

## 2022-06-02 VITALS
DIASTOLIC BLOOD PRESSURE: 80 MMHG | WEIGHT: 248 LBS | BODY MASS INDEX: 43.94 KG/M2 | HEIGHT: 63 IN | SYSTOLIC BLOOD PRESSURE: 122 MMHG

## 2022-06-02 PROCEDURE — 99024 POSTOP FOLLOW-UP VISIT: CPT | Performed by: OBSTETRICS & GYNECOLOGY

## 2022-06-02 ASSESSMENT — PATIENT HEALTH QUESTIONNAIRE - PHQ9
SUM OF ALL RESPONSES TO PHQ QUESTIONS 1-9: 0
SUM OF ALL RESPONSES TO PHQ9 QUESTIONS 1 & 2: 0
1. LITTLE INTEREST OR PLEASURE IN DOING THINGS: 0
SUM OF ALL RESPONSES TO PHQ QUESTIONS 1-9: 0
2. FEELING DOWN, DEPRESSED OR HOPELESS: 0

## 2022-06-02 NOTE — PROGRESS NOTES
Subjective: This 31 y.o. female presents today for a post-partum visit after vaginal delivery. Delivery Method: Vaginal delivery  Status of Baby: Disposition of baby: home. Baby is breast and formula fed. Postpartum Contraception: none      Past Medical History:   Diagnosis Date    Asthma     prn inhaler    Cholelithiasis with cholecystitis 11/24/2015    GERD (gastroesophageal reflux disease)     controlled with medication    Gestational diabetes 2017    Gestational hypertension     History of miscarriage 7/13/2014    Infertility, female     Morbid obesity ([address]     bmi =51    Nausea & vomiting     with every surg--3-4 times-- then ok per pt    Polycystic disease, ovaries     Pre-existing type 2 diabetes mellitus in pregnancy in third trimester 9/19/2017 12/12/2017 at [facility]:  Appropriate fetal growth, reassuring fetal status. AC 17%, overall 22%, DARIUS 17.8 cm, UA Dopplers WNL, BPP 8/8. Glucose log reviewed. Ranges from 77 to 164. Several PP's elevated. Reviewed diet and suggested increase in protein. Loosen PP parameters <140. Current Dose is now Levemir 16/16 units and Humalog 20/20/20. Follow up at [facility] in 3 weeks for fetal growth and BPP and     Pregnancy induced hypertension, antepartum 12/22/2017    Psychiatric disorder     anxiety-- per pt-- no tx    PUD (peptic ulcer disease) 2009    no problems since    Severe pre-eclampsia 12/22/2017    IOL at 37wks w/ PreE w/OUT severe features; developed several severe bps on L&D and recurrent severe bps pp; no Mag in labor--will hold for now. Start Procardia 30 XL BID.        Sleep apnea     ?--with 2014 surg at [facility]--- per pt never has been tested     Past Surgical History:   Procedure Laterality Date    CHOLECYSTECTOMY  11/2015    Dr Juarez Part EXTRACTION       Social History     Socioeconomic History    Marital status:      Spouse name: Not on file    Number of children: Not on file    Years of education: Not on file    Highest education level: Not on file   Occupational History    Not on file   Tobacco Use    Smoking status: Former Smoker     Packs/day: 1.00     Quit date: 2021     Years since quittin.7    Smokeless tobacco: Never Used   Vaping Use    Vaping Use: Never used   Substance and Sexual Activity    Alcohol use: No    Drug use: No     Types: Marijuana Dakotah Anshulnely)    Sexual activity: Not Currently     Partners: Male     Birth control/protection: None   Other Topics Concern    Not on file   Social History Narrative    Not on file     Social Determinants of Health     Financial Resource Strain:     Difficulty of Paying Living Expenses: Not on file   Food Insecurity:     Worried About Running Out of Food in the Last Year: Not on file    Dario of Food in the Last Year: Not on file   Transportation Needs:     Lack of Transportation (Medical): Not on file    Lack of Transportation (Non-Medical):  Not on file   Physical Activity:     Days of Exercise per Week: Not on file    Minutes of Exercise per Session: Not on file   Stress:     Feeling of Stress : Not on file   Social Connections:     Frequency of Communication with Friends and Family: Not on file    Frequency of Social Gatherings with Friends and Family: Not on file    Attends Anglican Services: Not on file    Active Member of [address] [place] [address] or Organizations: Not on file    Attends Club or Organization Meetings: Not on file    Marital Status: Not on file   Intimate Partner Violence:     Fear of Current or Ex-Partner: Not on file    Emotionally Abused: Not on file    Physically Abused: Not on file    Sexually Abused: Not on file   Housing Stability:     Unable to Pay for Housing in the Last Year: Not on file    Number of [place] in the Last Year: Not on file    Unstable Housing in the Last Year: Not on file     /80   Ht 5' 3\" (1.6 m)   Wt 248 lb (112.5 kg) Breastfeeding Yes   BMI 43.93 kg/m²      Review of Systems    Physical Exam  Vitals reviewed. Constitutional:       General: She is not in acute distress. Appearance: Normal appearance. She is well-developed and normal weight. She is not ill-appearing, toxic-appearing or diaphoretic. HENT:      Head: Normocephalic and atraumatic. Eyes:      General: No scleral icterus. Conjunctiva/sclera: Conjunctivae normal.      Pupils: Pupils are equal, round, and reactive to light. Cardiovascular:      Rate and Rhythm: Normal rate and regular rhythm. Heart sounds: Normal heart sounds. No murmur heard. No friction rub. No gallop. Pulmonary:      Effort: Pulmonary effort is normal. No respiratory distress. Breath sounds: Normal breath sounds. No stridor. No wheezing, rhonchi or rales. Chest:      Chest wall: No mass. Breasts:         Right: No inverted nipple, mass, skin change or tenderness. Left: No inverted nipple, mass, skin change or tenderness. Abdominal:      General: There is no distension. Palpations: Abdomen is soft. Tenderness: There is no abdominal tenderness. There is no guarding or rebound. Musculoskeletal:         General: Normal range of motion. Cervical back: Normal range of motion and neck supple. Skin:     General: Skin is warm and dry. Coloration: Skin is not jaundiced or pale. Findings: No erythema or rash. Neurological:      General: No focal deficit present. Mental Status: She is alert, oriented to person, place, and time and easily aroused. Motor: No abnormal muscle tone. Coordination: Coordination normal.   Psychiatric:         Mood and Affect: Mood normal.         Speech: Speech normal.         Behavior: Behavior normal. Behavior is cooperative. Thought Content: Thought content normal.         Judgment: Judgment normal.       1. 6 weeks postpartum follow-up      Doing well pp.  Denies mastitis, reviewed sx. Denies pp depression. Declines contraception. Counseled at length. Recommend continuing prenatal vitamin when breastfeeding and especially if not using contraception or only using condoms, etc. Pt expressed understanding. CHTN: BP WNL today  Needs hgbA1c checked at next annual.     Return if symptoms worsen or fail to improve, for Annual exam when due.

## 2022-06-02 NOTE — LETTER
[address] [address]  Phone: [phone]  Fax: [phone]    Alexsandra Levine MD    June 2, 2022       Patient: Tuyet Escalante   MR Number: [id number]   YOB: 1985   Date of Visit: 6/2/2022       To Whom It May Concern,      Tuyet Escalante is cleared to return to work without restriction on 6/13/2022.             Sincerely,          Alexsandra Levine MD

## 2022-07-26 RX ORDER — NIFEDIPINE 30 MG/1
TABLET, EXTENDED RELEASE ORAL
Qty: 180 TABLET | OUTPATIENT
Start: 2022-07-26

## 2022-08-31 RX ORDER — NORETHINDRONE ACETATE AND ETHINYL ESTRADIOL 1.5-30(21)
1 KIT ORAL DAILY
Qty: 3 PACKET | Refills: 0 | Status: SHIPPED | OUTPATIENT
Start: 2022-08-31

## 2022-11-23 RX ORDER — NORETHINDRONE ACETATE AND ETHINYL ESTRADIOL 1.5-30(21)
1 KIT ORAL DAILY
Qty: 3 PACKET | Refills: 0 | Status: SHIPPED | OUTPATIENT
Start: 2022-11-23

## 2023-01-17 ENCOUNTER — OFFICE VISIT (OUTPATIENT)
Dept: OBGYN CLINIC | Age: 38
End: 2023-01-17
Payer: COMMERCIAL

## 2023-01-17 ENCOUNTER — OFFICE VISIT (OUTPATIENT)
Dept: INTERNAL MEDICINE CLINIC | Facility: CLINIC | Age: 38
End: 2023-01-17
Payer: COMMERCIAL

## 2023-01-17 VITALS
SYSTOLIC BLOOD PRESSURE: 162 MMHG | WEIGHT: 274 LBS | BODY MASS INDEX: 46.78 KG/M2 | HEIGHT: 64 IN | DIASTOLIC BLOOD PRESSURE: 110 MMHG

## 2023-01-17 VITALS
DIASTOLIC BLOOD PRESSURE: 92 MMHG | BODY MASS INDEX: 48.3 KG/M2 | HEIGHT: 63 IN | TEMPERATURE: 97.2 F | RESPIRATION RATE: 18 BRPM | WEIGHT: 272.6 LBS | OXYGEN SATURATION: 96 % | HEART RATE: 79 BPM | SYSTOLIC BLOOD PRESSURE: 152 MMHG

## 2023-01-17 DIAGNOSIS — K21.9 GASTROESOPHAGEAL REFLUX DISEASE, UNSPECIFIED WHETHER ESOPHAGITIS PRESENT: ICD-10-CM

## 2023-01-17 DIAGNOSIS — I15.8 OTHER SECONDARY HYPERTENSION: ICD-10-CM

## 2023-01-17 DIAGNOSIS — Z72.0 TOBACCO ABUSE: ICD-10-CM

## 2023-01-17 DIAGNOSIS — F41.9 ANXIETY: ICD-10-CM

## 2023-01-17 DIAGNOSIS — Z01.419 WELL WOMAN EXAM WITH ROUTINE GYNECOLOGICAL EXAM: Primary | ICD-10-CM

## 2023-01-17 DIAGNOSIS — Z11.51 ENCOUNTER FOR SCREENING FOR HUMAN PAPILLOMAVIRUS (HPV): ICD-10-CM

## 2023-01-17 DIAGNOSIS — Z12.4 CERVICAL CANCER SCREENING: ICD-10-CM

## 2023-01-17 DIAGNOSIS — E66.01 MORBID OBESITY WITH BMI OF 45.0-49.9, ADULT (HCC): ICD-10-CM

## 2023-01-17 DIAGNOSIS — E66.01 CLASS 3 SEVERE OBESITY DUE TO EXCESS CALORIES WITHOUT SERIOUS COMORBIDITY WITH BODY MASS INDEX (BMI) OF 45.0 TO 49.9 IN ADULT (HCC): Primary | ICD-10-CM

## 2023-01-17 DIAGNOSIS — Z01.89 ENCOUNTER FOR ROUTINE LABORATORY TESTING: ICD-10-CM

## 2023-01-17 PROBLEM — O09.93 SUPERVISION OF HIGH RISK PREGNANCY IN THIRD TRIMESTER: Status: RESOLVED | Noted: 2017-09-19 | Resolved: 2023-01-17

## 2023-01-17 PROBLEM — O34.29 PREGNANCY W/ HX OF UTERINE MYOMECTOMY: Status: RESOLVED | Noted: 2017-09-18 | Resolved: 2023-01-17

## 2023-01-17 PROBLEM — O24.414 INSULIN CONTROLLED GESTATIONAL DIABETES MELLITUS (GDM) IN THIRD TRIMESTER: Status: RESOLVED | Noted: 2022-01-28 | Resolved: 2023-01-17

## 2023-01-17 PROBLEM — Z34.90 ENCOUNTER FOR INDUCTION OF LABOR: Status: RESOLVED | Noted: 2022-04-14 | Resolved: 2023-01-17

## 2023-01-17 PROBLEM — O16.3 HYPERTENSION AFFECTING PREGNANCY IN THIRD TRIMESTER: Status: RESOLVED | Noted: 2022-04-14 | Resolved: 2023-01-17

## 2023-01-17 PROBLEM — O34.13 UTERINE FIBROIDS AFFECTING PREGNANCY, THIRD TRIMESTER: Status: RESOLVED | Noted: 2017-07-04 | Resolved: 2023-01-17

## 2023-01-17 PROBLEM — O11.9 CHRONIC HYPERTENSION WITH SUPERIMPOSED PRE-ECLAMPSIA: Status: RESOLVED | Noted: 2022-04-15 | Resolved: 2023-01-17

## 2023-01-17 PROBLEM — O98.512 COVID-19 AFFECTING PREGNANCY IN SECOND TRIMESTER: Status: RESOLVED | Noted: 2021-10-08 | Resolved: 2023-01-17

## 2023-01-17 PROBLEM — O99.213 OBESITY AFFECTING PREGNANCY IN THIRD TRIMESTER: Status: RESOLVED | Noted: 2017-08-09 | Resolved: 2023-01-17

## 2023-01-17 PROBLEM — E66.813 CLASS 3 SEVERE OBESITY DUE TO EXCESS CALORIES WITHOUT SERIOUS COMORBIDITY WITH BODY MASS INDEX (BMI) OF 45.0 TO 49.9 IN ADULT: Status: ACTIVE | Noted: 2023-01-17

## 2023-01-17 PROBLEM — D25.9 UTERINE FIBROIDS AFFECTING PREGNANCY, THIRD TRIMESTER: Status: RESOLVED | Noted: 2017-07-04 | Resolved: 2023-01-17

## 2023-01-17 PROBLEM — O09.299 HX OF PREECLAMPSIA, PRIOR PREGNANCY, CURRENTLY PREGNANT: Status: RESOLVED | Noted: 2022-03-25 | Resolved: 2023-01-17

## 2023-01-17 PROBLEM — U07.1 COVID-19 AFFECTING PREGNANCY IN SECOND TRIMESTER: Status: RESOLVED | Noted: 2021-10-08 | Resolved: 2023-01-17

## 2023-01-17 PROCEDURE — 99204 OFFICE O/P NEW MOD 45 MIN: CPT | Performed by: INTERNAL MEDICINE

## 2023-01-17 PROCEDURE — 99406 BEHAV CHNG SMOKING 3-10 MIN: CPT | Performed by: INTERNAL MEDICINE

## 2023-01-17 PROCEDURE — 99395 PREV VISIT EST AGE 18-39: CPT | Performed by: OBSTETRICS & GYNECOLOGY

## 2023-01-17 RX ORDER — HYDROCHLOROTHIAZIDE 25 MG/1
25 TABLET ORAL EVERY MORNING
Qty: 90 TABLET | Refills: 1 | Status: SHIPPED | OUTPATIENT
Start: 2023-01-17 | End: 2023-01-17

## 2023-01-17 RX ORDER — NORETHINDRONE ACETATE AND ETHINYL ESTRADIOL 1.5-30(21)
1 KIT ORAL DAILY
Qty: 3 PACKET | Refills: 4 | Status: SHIPPED | OUTPATIENT
Start: 2023-01-17

## 2023-01-17 RX ORDER — SPIRONOLACTONE 25 MG/1
25 TABLET ORAL DAILY
Qty: 30 TABLET | Refills: 1 | Status: SHIPPED | OUTPATIENT
Start: 2023-01-17

## 2023-01-17 SDOH — HEALTH STABILITY: PHYSICAL HEALTH: ON AVERAGE, HOW MANY DAYS PER WEEK DO YOU ENGAGE IN MODERATE TO STRENUOUS EXERCISE (LIKE A BRISK WALK)?: 0 DAYS

## 2023-01-17 ASSESSMENT — ENCOUNTER SYMPTOMS
CHEST TIGHTNESS: 0
CONSTIPATION: 0
SHORTNESS OF BREATH: 0
COUGH: 0
DIARRHEA: 1
ABDOMINAL PAIN: 0
ABDOMINAL DISTENTION: 1

## 2023-01-17 ASSESSMENT — PATIENT HEALTH QUESTIONNAIRE - PHQ9
SUM OF ALL RESPONSES TO PHQ9 QUESTIONS 1 & 2: 0
1. LITTLE INTEREST OR PLEASURE IN DOING THINGS: 0
SUM OF ALL RESPONSES TO PHQ QUESTIONS 1-9: 0
SUM OF ALL RESPONSES TO PHQ QUESTIONS 1-9: 0
2. FEELING DOWN, DEPRESSED OR HOPELESS: 0
SUM OF ALL RESPONSES TO PHQ QUESTIONS 1-9: 0
SUM OF ALL RESPONSES TO PHQ QUESTIONS 1-9: 0

## 2023-01-17 NOTE — ASSESSMENT & PLAN NOTE
Uncontrolled, changes made today: Prilosec over-the-counter for 2 weeks and We will check H. pylori antigen in stool, if negative, and symptoms recur with treatment with PPI, will consider further work-up with endoscopies

## 2023-01-17 NOTE — ASSESSMENT & PLAN NOTE
Uncontrolled, changes made today: Instead of hydrochlorothiazide, we may try a spironolactone, which may also have an significant impact in her history of PCOS, we also discussed about dietary changes, decreasing sodium, and avoiding caffeine.  We will follow-up again in 4 weeks, medication adherence emphasized, lifestyle modifications recommended and she  will start checking her blood pressure at work, goal is to keep it below 140/90

## 2023-01-17 NOTE — PROGRESS NOTES
Patient presents today for   Chief Complaint   Patient presents with    Annual Exam       LMP: Patient's last menstrual period was 01/10/2023 (exact date). Contraception: OCP (estrogen/progesterone)        Allergies   Allergen Reactions    Latex Itching and Rash    Macadamia Nut Oil Anaphylaxis    Codeine Nausea And Vomiting    Sulfa Antibiotics Nausea And Vomiting    Naproxen Other (See Comments)     Can not take due to history peptic ulcers     Current Outpatient Medications   Medication Sig Dispense Refill    norethindrone-ethinyl estradiol-iron (JUNEL FE 1.5/30) 1.5-30 MG-MCG tablet Take 1 tablet by mouth daily 3 packet 4    hydroCHLOROthiazide (HYDRODIURIL) 25 MG tablet Take 1 tablet by mouth every morning 90 tablet 1    ibuprofen (ADVIL;MOTRIN) 600 MG tablet Take 600 mg by mouth every 6 hours as needed       No current facility-administered medications for this visit. Past Medical History:   Diagnosis Date    Asthma     prn inhaler    Cholelithiasis with cholecystitis 11/24/2015    GERD (gastroesophageal reflux disease)     controlled with medication    Gestational diabetes 2017    Gestational hypertension     History of miscarriage 7/13/2014    Infertility, female     Morbid obesity ([facility] [address]     bmi =51    Nausea & vomiting     with every surg--3-4 times-- then ok per pt    Polycystic disease, ovaries     Pre-existing type 2 diabetes mellitus in pregnancy in third trimester 9/19/2017 12/12/2017 at [facility]:  Appropriate fetal growth, reassuring fetal status. AC 17%, overall 22%, DARIUS 17.8 cm, UA Dopplers WNL, BPP 8/8. Glucose log reviewed. Ranges from 77 to 164. Several PP's elevated. Reviewed diet and suggested increase in protein. Loosen PP parameters <140. Current Dose is now Levemir 16/16 units and Humalog 20/20/20.    Follow up at [facility] in 3 weeks for fetal growth and BPP and     Pregnancy induced hypertension, antepartum 12/22/2017    Psychiatric disorder     anxiety-- per pt-- no tx    PUD (peptic ulcer disease)     no problems since    Severe pre-eclampsia 2017    IOL at 37wks w/ PreE w/OUT severe features; developed several severe bps on L&D and recurrent severe bps pp; no Mag in labor--will hold for now. Start Procardia 30 XL BID. Sleep apnea     ?--with  surg at [facility]--- per pt never has been tested     Past Surgical History:   Procedure Laterality Date    CHOLECYSTECTOMY  2015    Dr Rodriguez Lot    MYOMECTOMY      Tevin Reasoner History     Socioeconomic History    Marital status:      Spouse name: Not on file    Number of children: Not on file    Years of education: Not on file    Highest education level: Not on file   Occupational History    Not on file   Tobacco Use    Smoking status: Former     Packs/day: 0.50     Types: Cigarettes     Quit date: 2021     Years since quittin.3    Smokeless tobacco: Never   Vaping Use    Vaping Use: Never used   Substance and Sexual Activity    Alcohol use: No    Drug use: No     Types: Marijuana Charlaine Eliecer)    Sexual activity: Yes     Partners: Male     Birth control/protection: None     Comment: OCPs   Other Topics Concern    Not on file   Social History Narrative    Not on file     Social Determinants of Health     Financial Resource Strain: Not on file   Food Insecurity: Not on file   Transportation Needs: Not on file   Physical Activity: Not on file   Stress: Not on file   Social Connections: Not on file   Intimate Partner Violence: Not on file   Housing Stability: Not on file     Family History   Problem Relation Age of Onset    Breast Cancer Neg Hx     Ovarian Cancer Neg Hx     Elevated Lipids Father     Cancer Mother         cervical    Migraines Mother     Hypertension Mother     Colon Cancer Neg Hx      BP (!) 162/110   Ht 5' 3.75\" (1.619 m)   Wt 274 lb (124.3 kg)   LMP 01/10/2023 (Exact Date)   BMI 47.40 kg/m²      ROS:  Constitutional: Negative.     HENT: Negative. Eyes: Negative. Respiratory: Negative. Cardiovascular: Negative. Gastrointestinal: Negative. Endocrine: Negative. Genitourinary: Negative. Musculoskeletal: Negative. Allergic/Immunologic: Negative. Neurological: Negative. Hematological: Negative. Psychiatric/Behavioral: Negative. Physical Exam  Vitals signs reviewed. Elevated Blood pressure! Constitutional:       General: She is not in acute distress. Appearance: Normal appearance. She is well-developed. She is not ill-appearing, toxic-appearing or diaphoretic. HENT:      Head: Normocephalic and atraumatic. Eyes:      General: No scleral icterus. Conjunctiva/sclera: Conjunctivae normal.      Pupils: Pupils are equal, round, and reactive to light. Neck:      Musculoskeletal: Normal range of motion and neck supple. Thyroid: No thyromegaly. Vascular: No JVD. Trachea: No tracheal deviation. Cardiovascular:      Rate and Rhythm: Normal rate and regular rhythm. Heart sounds: Normal heart sounds. No murmur. No friction rub. No gallop. Pulmonary:      Effort: Pulmonary effort is normal. No respiratory distress. Breath sounds: Normal breath sounds. No stridor. No wheezing, rhonchi or rales. Chest:      Chest wall: No tenderness. Breasts:         Right: No inverted nipple, mass, nipple discharge, skin change or tenderness. Left: No inverted nipple, mass, nipple discharge, skin change or tenderness. Abdominal:      General: Bowel sounds are normal. There is no distension. Palpations: Abdomen is soft. There is no mass. Tenderness: There is no abdominal tenderness. There is no guarding or rebound. Genitourinary:     Labia:         Right: No rash, tenderness, lesion or injury. Left: No rash, tenderness, lesion or injury. Vagina: Normal. No signs of injury and foreign body. No vaginal discharge, erythema, tenderness or bleeding. Cervix: No cervical motion tenderness, discharge or friability. Uterus: Not enlarged and not tender. Adnexa:         Right: No mass, tenderness or fullness. Left: No mass, tenderness or fullness. Comments: External genitalia are normal in appearance. Examination of urethral meatus reveals location normal and size normal.   Examination of urethra shows no abnormalities. Examination of vaginal vault reveals no abnormalities. Cervix is long and closed without visible pathology. Pap smear collected. Uterine portion of bimanual exam reveals contour normal, shape regular, descensus absent, no nodularity, no tenderness and size 6 weeks GA. Adnexa and parametria exam reveals no masses, nontender. Visual examination of anus and perineum shows no abnormalities. Musculoskeletal: Normal range of motion. General: No tenderness. Lymphadenopathy:      Cervical: No cervical adenopathy. Upper Body:      Right upper body: No supraclavicular adenopathy. Left upper body: No supraclavicular adenopathy. Lower Body: No right inguinal adenopathy. No left inguinal adenopathy. Skin:     General: Skin is warm and dry. Coloration: Skin is not pale. Findings: No erythema or rash. Neurological:      Mental Status: She is alert and oriented to person, place, and time. Cranial Nerves: No cranial nerve deficit. Motor: No abnormal muscle tone. Coordination: Coordination normal.   Psychiatric:         Behavior: Behavior normal.         Thought Content: Thought content normal.         Judgment: Judgment normal.         1. Well woman exam with routine gynecological exam    2. Encounter for screening for human papillomavirus (HPV)    3. Cervical cancer screening    4. Other secondary hypertension    5.  Morbid obesity with BMI of 45.0-49.9, adult ([address]      Orders Placed This Encounter   Procedures    PAP IG, HPV Rfx HPV 16/18,45    [facility], [place]     Orders Placed This Encounter   Medications    norethindrone-ethinyl estradiol-iron (JUNEL FE 1.5/30) 1.5-30 MG-MCG tablet     Sig: Take 1 tablet by mouth daily     Dispense:  3 packet     Refill:  4    hydroCHLOROthiazide (HYDRODIURIL) 25 MG tablet     Sig: Take 1 tablet by mouth every morning     Dispense:  90 tablet     Refill:  1       Routine age-appropriate well woman counseling was performed. Pap smear collected. Discussed her BP - Pt advised to see her PCP for any non-gyn complaints as noted above --> Specifically for elevated blood pressure in severe range. Pt does not have a current PCP. Pt has consistently had elevated pressures in our office. Discussed that at her BMI, she is at a higher risk of DVT/PE & even stroke with HTN - smoking worsens this risk and OCPs increase this risk as well. If she is unable to quit smoking and unable to get control of her BP and or lose weight, may need to switch to progesterone only pill in order to decrease this risk. Option of trying low dose of HCTZ discussed while pt tries to get in with a PCP. Pt agreed. Referral placed and Rx sent. Pt is working day shift now but weight has not improved - has a 5 mo old and a 4yo.      Return in about 1 year (around 1/17/2024), or if symptoms worsen or fail to improve, for Annual exam.

## 2023-01-17 NOTE — PROGRESS NOTES
Chief Complaint   Patient presents with    New Patient        Mercedes Alicia is a 30 y.o. female who presents today for New Patient  She is a new patient to came to establish care for primary care, she has not seen a primary care for many years, has been following with GYN, she has a recent visit with GYN, and Pap was completed. She lives with her , has 3 kids, she is a nurse and [employer]  She has the history of polycystic ovarian disease, preeclampsia, gestational diabetes, and morbid obesity,    She was seen today by GYN, and was found to have elevated blood pressure, so her OB/GYN recommended to start antihypertensive medication, and to establish care with primary care. Hydrochlorothiazide was sent to the pharmacy, but patient reports that she has a sulfa allergy    She expresses concerns about obesity, and inability to lose weight, she believes is associated with her hormonal imbalance,    Has a history of acid reflux, taking Pepcid on and off every day or every other day, she has not been able to pinpoint possible triggers, she had her gallbladder removed, and expresses sometimes occasional diarrhea. Denies any blood in the stools, or unintentional weight loss. She has been smoking at least half a packet of cigarettes, and is interested to quit smoking. She believes may have issues with anxiety, mood changes, and at some point she would like to be treated for anxiety. As well as help to quit smoking    Wt Readings from Last 3 Encounters:   01/17/23 272 lb 9.6 oz (123.7 kg)   01/17/23 274 lb (124.3 kg)   06/02/22 248 lb (112.5 kg)     Vitals:    01/17/23 1424   BP: (!) 152/92   Site: Left Upper Arm   Position: Sitting   Pulse: 79   Resp: 18   Temp: 97.2 °F (36.2 °C)   TempSrc: Temporal   SpO2: 96%   Weight: 272 lb 9.6 oz (123.7 kg)   Height: 5' 3\" (1.6 m)        Assessment and plan:  1.  Class 3 severe obesity due to excess calories without serious comorbidity with body mass index (BMI) of 45.0 to 49.9 in adult [facility])  Assessment & Plan:   Discussed with patient that having obesity increases a person's risk of developing many health problems. Diabetes, High blood pressure, High cholesterol, Heart disease (including heart attacks), Stroke, Sleep apnea (a disorder in which you stop breathing for short periods while asleep), Asthma, Cancer  But even if weight loss is not possible, may reduce risk by:  Become more active - Many types of physical activity can help, including walking. You can start with a few minutes a day and add more as you get stronger and build up your endurance. Improve your diet - It is healthy to have regular meal times, eat smaller portions, and not skip meals. Avoid sweets and processed foods, and instead eat more vegetables and fruits. Quit smoking (if you smoke) - Some people start eating more after they stop smoking, so try to make healthy food choices. Even if it increases your appetite, quitting smoking is still one of the best things you can do to improve your health. Limit alcohol - Drink no more than 1 drink a day if you are woman, and no more than 2 drinks a day if you are a man      Orders:  -     TSH with Reflex; Future  -     Hemoglobin A1C; Future  2. Gastroesophageal reflux disease, unspecified whether esophagitis present  Assessment & Plan:   Uncontrolled, changes made today: Prilosec over-the-counter for 2 weeks and We will check H. pylori antigen in stool, if negative, and symptoms recur with treatment with PPI, will consider further work-up with endoscopies  Orders:  -     H. Pylori Antigen, Stool; Future  3. Other secondary hypertension  Assessment & Plan:   Uncontrolled, changes made today: Instead of hydrochlorothiazide, we may try a spironolactone, which may also have an significant impact in her history of PCOS, we also discussed about dietary changes, decreasing sodium, and avoiding caffeine.   We will follow-up again in 4 weeks, medication adherence emphasized, lifestyle modifications recommended and she  will start checking her blood pressure at work, goal is to keep it below 140/90  Orders:  -     Comprehensive Metabolic Panel; Future  -     TSH with Reflex; Future  -     Urinalysis with Reflex to Culture; Future  -     Magnesium; Future  -     spironolactone (ALDACTONE) 25 MG tablet; Take 1 tablet by mouth daily, Disp-30 tablet, R-1Normal  4. Encounter for routine laboratory testing  -     CBC with Auto Differential; Future  -     Comprehensive Metabolic Panel; Future  -     Lipid Panel; Future  -     TSH with Reflex; Future  -     Hemoglobin A1C; Future  -     Urinalysis with Reflex to Culture; Future  5. Tobacco abuse  Assessment & Plan:   Tobacco cessation was discussed with patient. Patient was instructed about harms of smoking, benefits of quitting. Possible alternatives like counseling, coaching programs, support of friends and family, and/or medications  At least 3-5 minutes spend in counseling. 6. Anxiety  Discussed briefly about anxiety, and insomnia, will try to get her blood pressure under control, and consider Wellbutrin during our next visit, which may be helpful for depression, obesity, anxiety and smoking cessation. Return in about 4 weeks (around 2/14/2023) for HTN, smoking cesation, insomnia. Review of system:    Review of Systems   Constitutional:  Negative for activity change, chills, fatigue and fever. Respiratory:  Negative for cough, chest tightness and shortness of breath. Cardiovascular:  Negative for chest pain. Gastrointestinal:  Positive for abdominal distention and diarrhea. Negative for abdominal pain and constipation. Neurological:  Positive for dizziness and light-headedness. Negative for headaches. Psychiatric/Behavioral:  Positive for decreased concentration, dysphoric mood and sleep disturbance. The patient is nervous/anxious.         Immunization history:    Immunization History Administered Date(s) Administered    Influenza, FLUCELVAX, (age 10 mo+), MDCK, PF, 0.5mL 11/16/2017    MMR 12/30/2017, 04/16/2022    Tdap (Boostrix, Adacel) 12/30/2017       Current medications:      Current Outpatient Medications:     norethindrone-ethinyl estradiol-iron (JUNEL FE 1.5/30) 1.5-30 MG-MCG tablet, Take 1 tablet by mouth daily, Disp: 3 packet, Rfl: 4    spironolactone (ALDACTONE) 25 MG tablet, Take 1 tablet by mouth daily, Disp: 30 tablet, Rfl: 1    ibuprofen (ADVIL;MOTRIN) 600 MG tablet, Take 600 mg by mouth every 6 hours as needed, Disp: , Rfl:       Family history:    Family History   Problem Relation Age of Onset    Breast Cancer Neg Hx     Ovarian Cancer Neg Hx     Elevated Lipids Father     Cancer Mother         cervical    Migraines Mother     Hypertension Mother     Colon Cancer Neg Hx         Past medical history:    Past Medical History:   Diagnosis Date    Asthma     prn inhaler    Cholelithiasis with cholecystitis 11/24/2015    GERD (gastroesophageal reflux disease)     controlled with medication    Gestational diabetes 2017    Gestational hypertension     History of miscarriage 7/13/2014    Infertility, female     Insulin controlled gestational diabetes mellitus (GDM) in third trimester 1/28/2022    . .. 3/10/22: Resent script to [id number]; Pt to continue  Metformin ER 1000 mg BID and start LA Insulin 8/8. 3/18/22:Continue Metformin ER 1000 BID and Lantus 8/8.  3/25/2022 at [facility]: Excellent BS control and appropriate fetal growth. Most readings WNL.  Continue Metformin ER 1000 mg BID and Lantus 8/8. 4/2/22: Log reviewed, most readings WNL with some fluctuating elevations,  w    Morbid obesity ([facility])     bmi =51    Nausea & vomiting     with every surg--3-4 times-- then ok per pt    Polycystic disease, ovaries     Postpartum hypertension 4/20/2022    Pre-existing type 2 diabetes mellitus in pregnancy in third trimester 9/19/2017 12/12/2017 at [facility]:  Appropriate fetal growth, reassuring fetal status. AC 17%, overall 22%, DARIUS 17.8 cm, UA Dopplers WNL, BPP 8/8. Glucose log reviewed. Ranges from 77 to 164. Several PP's elevated. Reviewed diet and suggested increase in protein. Loosen PP parameters <140. Current Dose is now Levemir 16/16 units and Humalog 20/20/20. Follow up at [facility] in 3 weeks for fetal growth and BPP and     Pregnancy induced hypertension, antepartum 12/22/2017    Psychiatric disorder     anxiety-- per pt-- no tx    PUD (peptic ulcer disease) 2009    no problems since    Severe pre-eclampsia 12/22/2017    IOL at 37wks w/ PreE w/OUT severe features; developed several severe bps on L&D and recurrent severe bps pp; no Mag in labor--will hold for now. Start Procardia 30 XL BID. Sleep apnea     ?--with 2014 surg at [facility]--- per pt never has been tested        Past Surgical History:   Procedure Laterality Date    CHOLECYSTECTOMY  11/2015    Dr Pamela Lorenz  2014    [address] EXTRACTION          Physical exam:    BP (!) 152/92 (Site: Left Upper Arm, Position: Sitting)   Pulse 79   Temp 97.2 °F (36.2 °C) (Temporal)   Resp 18   Ht 5' 3\" (1.6 m)   Wt 272 lb 9.6 oz (123.7 kg)   LMP 01/10/2023   SpO2 96%   BMI 48.29 kg/m²     Physical Exam  Vitals reviewed. Constitutional:       Appearance: Normal appearance. She is obese. HENT:      Head: Normocephalic and atraumatic. Cardiovascular:      Rate and Rhythm: Normal rate. Pulmonary:      Effort: Pulmonary effort is normal.   Neurological:      General: No focal deficit present. Mental Status: She is alert and oriented to person, place, and time.    Psychiatric:         Mood and Affect: Mood normal.         Behavior: Behavior normal.        Recent labs:    No results found for: CHOL  No results found for: TRIG  No results found for: HDL  No results found for: LDLCHOLESTEROL, LDLCALC  No results found for: LABVLDL, VLDL  No results found for: [facility]  Lab Results   Component Value Date     04/20/2022    K 3.8 04/20/2022     04/20/2022    CO2 26 04/20/2022    BUN 8 04/20/2022    CREATININE 0.49 (L) 04/20/2022    GLUCOSE 94 04/20/2022    CALCIUM 9.2 04/20/2022    PROT 7.1 04/20/2022    LABALBU 2.8 (L) 04/20/2022    BILITOT 0.2 04/20/2022    ALKPHOS 73 04/20/2022    AST 15 04/20/2022    ALT 26 04/20/2022    GFRAA >60 04/20/2022    AGRATIO 0.7 (L) 04/20/2022    GLOB 4.3 (H) 04/20/2022     Lab Results   Component Value Date    WBC 8.7 04/20/2022    HGB 13.2 04/20/2022    HCT 38.6 04/20/2022    MCV 86.4 04/20/2022     04/20/2022             This document was generated with the aid of voice recognition software. . Please be aware that there may be inadvertent transcription errors not identified and corrected by the [employer]

## 2023-01-27 DIAGNOSIS — E66.01 CLASS 3 SEVERE OBESITY DUE TO EXCESS CALORIES WITHOUT SERIOUS COMORBIDITY WITH BODY MASS INDEX (BMI) OF 45.0 TO 49.9 IN ADULT (HCC): ICD-10-CM

## 2023-01-27 DIAGNOSIS — Z01.89 ENCOUNTER FOR ROUTINE LABORATORY TESTING: ICD-10-CM

## 2023-01-27 DIAGNOSIS — I15.8 OTHER SECONDARY HYPERTENSION: ICD-10-CM

## 2023-01-27 LAB
ALBUMIN SERPL-MCNC: 3.8 G/DL (ref 3.5–5)
ALBUMIN/GLOB SERPL: 1 (ref 0.4–1.6)
ALP SERPL-CCNC: 39 U/L (ref 50–136)
ALT SERPL-CCNC: 22 U/L (ref 12–65)
ANION GAP SERPL CALC-SCNC: 7 MMOL/L (ref 2–11)
APPEARANCE UR: CLEAR
AST SERPL-CCNC: 7 U/L (ref 15–37)
BACTERIA URNS QL MICRO: NEGATIVE /HPF
BASOPHILS # BLD: 0 K/UL (ref 0–0.2)
BASOPHILS NFR BLD: 0 % (ref 0–2)
BILIRUB SERPL-MCNC: 0.5 MG/DL (ref 0.2–1.1)
BILIRUB UR QL: NEGATIVE
BUN SERPL-MCNC: 11 MG/DL (ref 6–23)
CALCIUM SERPL-MCNC: 9.5 MG/DL (ref 8.3–10.4)
CASTS URNS QL MICRO: ABNORMAL /LPF (ref 0–2)
CHLORIDE SERPL-SCNC: 104 MMOL/L (ref 101–110)
CHOLEST SERPL-MCNC: 187 MG/DL
CO2 SERPL-SCNC: 25 MMOL/L (ref 21–32)
COLOR UR: ABNORMAL
CREAT SERPL-MCNC: 0.6 MG/DL (ref 0.6–1)
DIFFERENTIAL METHOD BLD: ABNORMAL
EOSINOPHIL # BLD: 0.1 K/UL (ref 0–0.8)
EOSINOPHIL NFR BLD: 1 % (ref 0.5–7.8)
EPI CELLS #/AREA URNS HPF: ABNORMAL /HPF (ref 0–5)
ERYTHROCYTE [DISTWIDTH] IN BLOOD BY AUTOMATED COUNT: 11.9 % (ref 11.9–14.6)
GLOBULIN SER CALC-MCNC: 3.7 G/DL (ref 2.8–4.5)
GLUCOSE SERPL-MCNC: 99 MG/DL (ref 65–100)
GLUCOSE UR STRIP.AUTO-MCNC: NEGATIVE MG/DL
HCT VFR BLD AUTO: 45.4 % (ref 35.8–46.3)
HDLC SERPL-MCNC: 56 MG/DL (ref 40–60)
HDLC SERPL: 3.3
HGB BLD-MCNC: 14.9 G/DL (ref 11.7–15.4)
HGB UR QL STRIP: NEGATIVE
IMM GRANULOCYTES # BLD AUTO: 0 K/UL (ref 0–0.5)
IMM GRANULOCYTES NFR BLD AUTO: 0 % (ref 0–5)
KETONES UR QL STRIP.AUTO: NEGATIVE MG/DL
LDLC SERPL CALC-MCNC: 109.8 MG/DL
LEUKOCYTE ESTERASE UR QL STRIP.AUTO: NEGATIVE
LYMPHOCYTES # BLD: 2.8 K/UL (ref 0.5–4.6)
LYMPHOCYTES NFR BLD: 27 % (ref 13–44)
MAGNESIUM SERPL-MCNC: 2.1 MG/DL (ref 1.8–2.4)
MCH RBC QN AUTO: 28.4 PG (ref 26.1–32.9)
MCHC RBC AUTO-ENTMCNC: 32.8 G/DL (ref 31.4–35)
MCV RBC AUTO: 86.6 FL (ref 82–102)
MONOCYTES # BLD: 0.5 K/UL (ref 0.1–1.3)
MONOCYTES NFR BLD: 4 % (ref 4–12)
MUCOUS THREADS URNS QL MICRO: 0 /LPF
NEUTS SEG # BLD: 7.1 K/UL (ref 1.7–8.2)
NEUTS SEG NFR BLD: 68 % (ref 43–78)
NITRITE UR QL STRIP.AUTO: NEGATIVE
NRBC # BLD: 0 K/UL (ref 0–0.2)
PH UR STRIP: 7 (ref 5–9)
PLATELET # BLD AUTO: 376 K/UL (ref 150–450)
PMV BLD AUTO: 9.4 FL (ref 9.4–12.3)
POTASSIUM SERPL-SCNC: 4.1 MMOL/L (ref 3.5–5.1)
PROT SERPL-MCNC: 7.5 G/DL (ref 6.3–8.2)
PROT UR STRIP-MCNC: NEGATIVE MG/DL
RBC # BLD AUTO: 5.24 M/UL (ref 4.05–5.2)
RBC #/AREA URNS HPF: ABNORMAL /HPF (ref 0–5)
SODIUM SERPL-SCNC: 136 MMOL/L (ref 133–143)
SP GR UR REFRACTOMETRY: 1.01 (ref 1–1.02)
TRIGL SERPL-MCNC: 106 MG/DL (ref 35–150)
TSH W FREE THYROID IF ABNORMAL: 0.75 UIU/ML (ref 0.36–3.74)
URINE CULTURE IF INDICATED: ABNORMAL
UROBILINOGEN UR QL STRIP.AUTO: 0.2 EU/DL (ref 0.2–1)
VLDLC SERPL CALC-MCNC: 21.2 MG/DL (ref 6–23)
WBC # BLD AUTO: 10.6 K/UL (ref 4.3–11.1)
WBC URNS QL MICRO: ABNORMAL /HPF (ref 0–4)

## 2023-01-28 LAB
EST. AVERAGE GLUCOSE BLD GHB EST-MCNC: 114 MG/DL
HBA1C MFR BLD: 5.6 % (ref 4.8–5.6)

## 2023-02-20 ENCOUNTER — OFFICE VISIT (OUTPATIENT)
Dept: INTERNAL MEDICINE CLINIC | Facility: CLINIC | Age: 38
End: 2023-02-20
Payer: COMMERCIAL

## 2023-02-20 VITALS
DIASTOLIC BLOOD PRESSURE: 92 MMHG | OXYGEN SATURATION: 96 % | WEIGHT: 273.4 LBS | HEART RATE: 89 BPM | SYSTOLIC BLOOD PRESSURE: 148 MMHG | HEIGHT: 63 IN | BODY MASS INDEX: 48.44 KG/M2

## 2023-02-20 DIAGNOSIS — I15.8 OTHER SECONDARY HYPERTENSION: Primary | ICD-10-CM

## 2023-02-20 DIAGNOSIS — F41.9 ANXIETY: ICD-10-CM

## 2023-02-20 PROCEDURE — 99214 OFFICE O/P EST MOD 30 MIN: CPT | Performed by: INTERNAL MEDICINE

## 2023-02-20 RX ORDER — ESCITALOPRAM OXALATE 5 MG/1
5 TABLET ORAL DAILY
Qty: 30 TABLET | Refills: 0 | Status: SHIPPED | OUTPATIENT
Start: 2023-02-20

## 2023-02-20 RX ORDER — SPIRONOLACTONE 50 MG/1
50 TABLET, FILM COATED ORAL DAILY
Qty: 90 TABLET | Refills: 0 | Status: SHIPPED | OUTPATIENT
Start: 2023-02-20

## 2023-02-20 ASSESSMENT — ANXIETY QUESTIONNAIRES
2. NOT BEING ABLE TO STOP OR CONTROL WORRYING: 1
3. WORRYING TOO MUCH ABOUT DIFFERENT THINGS: 1
IF YOU CHECKED OFF ANY PROBLEMS ON THIS QUESTIONNAIRE, HOW DIFFICULT HAVE THESE PROBLEMS MADE IT FOR YOU TO DO YOUR WORK, TAKE CARE OF THINGS AT HOME, OR GET ALONG WITH OTHER PEOPLE: VERY DIFFICULT
GAD7 TOTAL SCORE: 7
6. BECOMING EASILY ANNOYED OR IRRITABLE: 3
4. TROUBLE RELAXING: 1
7. FEELING AFRAID AS IF SOMETHING AWFUL MIGHT HAPPEN: 0
1. FEELING NERVOUS, ANXIOUS, OR ON EDGE: 1
5. BEING SO RESTLESS THAT IT IS HARD TO SIT STILL: 0

## 2023-02-20 ASSESSMENT — PATIENT HEALTH QUESTIONNAIRE - PHQ9
SUM OF ALL RESPONSES TO PHQ QUESTIONS 1-9: 1
2. FEELING DOWN, DEPRESSED OR HOPELESS: 0
SUM OF ALL RESPONSES TO PHQ9 QUESTIONS 1 & 2: 1
SUM OF ALL RESPONSES TO PHQ QUESTIONS 1-9: 1
1. LITTLE INTEREST OR PLEASURE IN DOING THINGS: 1

## 2023-02-20 ASSESSMENT — ENCOUNTER SYMPTOMS
CONSTIPATION: 0
ABDOMINAL DISTENTION: 0
SHORTNESS OF BREATH: 0
CHEST TIGHTNESS: 0
ABDOMINAL PAIN: 0
COUGH: 0

## 2023-02-20 NOTE — PROGRESS NOTES
Chief Complaint   Patient presents with    Follow-up     4 week f/u for HTN. Enrico Bettencourt is a 30 y.o. female who presents today for Follow-up (4 week f/u for HTN. )     She is here for follow-up of hypertension, and anxiety,  She started taking spironolactone, checking her blood pressure at home, continue to be slightly increased, she reports some muscle cramps, but she is not sure if this is because of medication. She completed her kidney function test and did not show significant abnormalities. Today she is expressing interest in medication for anxiety, she reports mood changes , stressed, her  has offer help, so she can rest but she has not yet taken some time for her. She is still smoking    Wt Readings from Last 3 Encounters:   02/20/23 273 lb 6.4 oz (124 kg)   01/17/23 272 lb 9.6 oz (123.7 kg)   01/17/23 274 lb (124.3 kg)     Vitals:    02/20/23 1403   BP: (!) 148/92   Site: Left Upper Arm   Position: Sitting   Pulse: 89   SpO2: 96%   Weight: 273 lb 6.4 oz (124 kg)   Height: 5' 3\" (1.6 m)        Assessment and plan:  1. Other secondary hypertension  -     spironolactone (ALDACTONE) 50 MG tablet; Take 1 tablet by mouth daily, Disp-90 tablet, R-0Normal  2. Anxiety  -     escitalopram (LEXAPRO) 5 MG tablet; Take 1 tablet by mouth daily, Disp-30 tablet, R-0Normal    Return in about 4 weeks (around 3/20/2023). Review of system:    Review of Systems   Constitutional:  Negative for activity change, chills, fatigue and fever. Respiratory:  Negative for cough, chest tightness and shortness of breath. Cardiovascular:  Negative for chest pain. Gastrointestinal:  Negative for abdominal distention, abdominal pain and constipation. Neurological:  Negative for dizziness and headaches. Psychiatric/Behavioral:  Positive for sleep disturbance. The patient is nervous/anxious.         Immunization history:    Immunization History   Administered Date(s) Administered    Influenza, FLUCELVAX, (age 6 mo+), MDCK, PF, 0.5mL 11/16/2017    MMR 12/30/2017, 04/16/2022    Tdap (Boostrix, Adacel) 12/30/2017       Current medications:      Current Outpatient Medications:     spironolactone (ALDACTONE) 50 MG tablet, Take 1 tablet by mouth daily, Disp: 90 tablet, Rfl: 0    escitalopram (LEXAPRO) 5 MG tablet, Take 1 tablet by mouth daily, Disp: 30 tablet, Rfl: 0    norethindrone-ethinyl estradiol-iron (JUNEL FE 1.5/30) 1.5-30 MG-MCG tablet, Take 1 tablet by mouth daily, Disp: 3 packet, Rfl: 4    ibuprofen (ADVIL;MOTRIN) 600 MG tablet, Take 600 mg by mouth every 6 hours as needed, Disp: , Rfl:       Family history:    Family History   Problem Relation Age of Onset    Breast Cancer Neg Hx     Ovarian Cancer Neg Hx     Elevated Lipids Father     Cancer Mother         cervical    Migraines Mother     Hypertension Mother     Colon Cancer Neg Hx         Past medical history:    Past Medical History:   Diagnosis Date    Asthma     prn inhaler    Cholelithiasis with cholecystitis 11/24/2015    GERD (gastroesophageal reflux disease)     controlled with medication    Gestational diabetes 2017    Gestational hypertension     History of miscarriage 7/13/2014    Infertility, female     Insulin controlled gestational diabetes mellitus (GDM) in third trimester 1/28/2022    ... 3/10/22: Resent script to [facility]; Pt to continue  Metformin ER 1000 mg BID and start LA Insulin 8/8. 3/18/22:Continue Metformin ER 1000 BID and Lantus 8/8.  3/25/2022 at [facility]: Excellent BS control and appropriate fetal growth.   Most readings WNL. Continue Metformin ER 1000 mg BID and Lantus 8/8. 4/2/22: Log reviewed, most readings WNL with some fluctuating elevations,  w    Morbid obesity (HCC)     bmi =51    Nausea & vomiting     with every surg--3-4 times-- then ok per pt    Polycystic disease, ovaries     Postpartum hypertension 4/20/2022    Pre-existing type 2 diabetes mellitus in pregnancy in third trimester 9/19/2017     12/12/2017 at [facility]:  Appropriate fetal growth, reassuring fetal status. AC 17%, overall 22%, DARIUS 17.8 cm, UA Dopplers WNL, BPP 8/8. Glucose log reviewed. Ranges from 77 to 164. Several PP's elevated. Reviewed diet and suggested increase in protein. Loosen PP parameters <140. Current Dose is now Levemir 16/16 units and Humalog 20/20/20. Follow up at [facility] in 3 weeks for fetal growth and BPP and     Pregnancy induced hypertension, antepartum 12/22/2017    Psychiatric disorder     anxiety-- per pt-- no tx    PUD (peptic ulcer disease) 2009    no problems since    Severe pre-eclampsia 12/22/2017    IOL at 37wks w/ PreE w/OUT severe features; developed several severe bps on L&D and recurrent severe bps pp; no Mag in labor--will hold for now. Start Procardia 30 XL BID. Sleep apnea     ?--with 2014 surg at [facility]--- per pt never has been tested        Past Surgical History:   Procedure Laterality Date    CHOLECYSTECTOMY  11/2015    Dr Donna Posey  2014    [address] EXTRACTION          Physical exam:    BP (!) 148/92 (Site: Left Upper Arm, Position: Sitting)   Pulse 89   Ht 5' 3\" (1.6 m)   Wt 273 lb 6.4 oz (124 kg)   SpO2 96%   BMI 48.43 kg/m²     Physical Exam  Vitals reviewed. Constitutional:       Appearance: Normal appearance. HENT:      Head: Normocephalic and atraumatic. Cardiovascular:      Rate and Rhythm: Normal rate and regular rhythm. Pulmonary:      Effort: Pulmonary effort is normal.      Breath sounds: Normal breath sounds. Abdominal:      Palpations: Abdomen is soft. Neurological:      General: No focal deficit present. Mental Status: She is alert and oriented to person, place, and time. Psychiatric:         Mood and Affect: Mood normal.         Behavior: Behavior normal.         Thought Content:  Thought content normal.         Judgment: Judgment normal.        Recent labs:    Hemoglobin A1C   Date Value Ref Range Status   01/27/2023 5.6 4.8 - 5.6 % Final        Lab Results   Component Value Date    CHOL 187 01/27/2023     Lab Results   Component Value Date    TRIG 106 01/27/2023     Lab Results   Component Value Date    HDL 56 01/27/2023     Lab Results   Component Value Date    LDLCALC 109.8 (H) 01/27/2023     Lab Results   Component Value Date    LABVLDL 21.2 01/27/2023     Lab Results   Component Value Date    CHOLHDLRATIO 3.3 01/27/2023       Lab Results   Component Value Date    TSH 0.108 (L) 10/05/2021       Lab Results   Component Value Date     01/27/2023    K 4.1 01/27/2023     01/27/2023    CO2 25 01/27/2023    BUN 11 01/27/2023    CREATININE 0.60 01/27/2023    GLUCOSE 99 01/27/2023    CALCIUM 9.5 01/27/2023    PROT 7.5 01/27/2023    LABALBU 3.8 01/27/2023    BILITOT 0.5 01/27/2023    ALKPHOS 39 (L) 01/27/2023    AST 7 (L) 01/27/2023    ALT 22 01/27/2023    LABGLOM >60 01/27/2023    GFRAA >60 04/20/2022    AGRATIO 0.7 (L) 04/20/2022    GLOB 3.7 01/27/2023       Lab Results   Component Value Date    WBC 10.6 01/27/2023    HGB 14.9 01/27/2023    HCT 45.4 01/27/2023    MCV 86.6 01/27/2023     01/27/2023             This document was generated with the aid of voice recognition software.. Please be aware that there may be inadvertent transcription errors not identified and corrected by the author

## 2023-02-20 NOTE — ASSESSMENT & PLAN NOTE
Uncontrolled, changes made today: will increase spironolactone 50 mg, and we will follow-up again in 4 to 6 weeks, medication adherence emphasized and lifestyle modifications recommended

## 2023-02-20 NOTE — ASSESSMENT & PLAN NOTE
Discussed physiopathology of depression and anxiety , also the role of relaxation and possitive activities , exercise, pyschotherapy, counseling services and also discussed multiple pharmacological agents that may help , with some of their side effects , including but not limited to Nausea , diarrhea , dizziness, changes in mood , suicidal and homicidal ideation specially during the first 2 weeks.  we also discussed that full or partial effect of this medications is seen usually after week 4-6    We were considering Wellbutrin, but I am afraid that her anxiety would get worse, as she is already experiencing mood changes, we decided to try SSRI in the low-dose, we will follow-up again in 4 to 6 weeks

## 2023-03-31 ENCOUNTER — OFFICE VISIT (OUTPATIENT)
Dept: INTERNAL MEDICINE CLINIC | Facility: CLINIC | Age: 38
End: 2023-03-31
Payer: COMMERCIAL

## 2023-03-31 VITALS
HEART RATE: 101 BPM | WEIGHT: 273.2 LBS | HEIGHT: 63 IN | OXYGEN SATURATION: 98 % | DIASTOLIC BLOOD PRESSURE: 88 MMHG | SYSTOLIC BLOOD PRESSURE: 122 MMHG | BODY MASS INDEX: 48.41 KG/M2

## 2023-03-31 DIAGNOSIS — R06.81 WITNESSED EPISODE OF APNEA: ICD-10-CM

## 2023-03-31 DIAGNOSIS — F41.9 ANXIETY: ICD-10-CM

## 2023-03-31 DIAGNOSIS — I15.8 OTHER SECONDARY HYPERTENSION: Primary | ICD-10-CM

## 2023-03-31 DIAGNOSIS — R06.83 SNORES: ICD-10-CM

## 2023-03-31 PROCEDURE — 99214 OFFICE O/P EST MOD 30 MIN: CPT | Performed by: INTERNAL MEDICINE

## 2023-03-31 RX ORDER — ESCITALOPRAM OXALATE 10 MG/1
10 TABLET ORAL DAILY
Qty: 90 TABLET | Refills: 0 | Status: SHIPPED | OUTPATIENT
Start: 2023-03-31

## 2023-03-31 SDOH — ECONOMIC STABILITY: FOOD INSECURITY: WITHIN THE PAST 12 MONTHS, THE FOOD YOU BOUGHT JUST DIDN'T LAST AND YOU DIDN'T HAVE MONEY TO GET MORE.: NEVER TRUE

## 2023-03-31 SDOH — ECONOMIC STABILITY: INCOME INSECURITY: HOW HARD IS IT FOR YOU TO PAY FOR THE VERY BASICS LIKE FOOD, HOUSING, MEDICAL CARE, AND HEATING?: NOT HARD AT ALL

## 2023-03-31 SDOH — ECONOMIC STABILITY: HOUSING INSECURITY
IN THE LAST 12 MONTHS, WAS THERE A TIME WHEN YOU DID NOT HAVE A STEADY PLACE TO SLEEP OR SLEPT IN A SHELTER (INCLUDING NOW)?: NO

## 2023-03-31 ASSESSMENT — PATIENT HEALTH QUESTIONNAIRE - PHQ9
SUM OF ALL RESPONSES TO PHQ QUESTIONS 1-9: 0
2. FEELING DOWN, DEPRESSED OR HOPELESS: 0
SUM OF ALL RESPONSES TO PHQ QUESTIONS 1-9: 0
1. LITTLE INTEREST OR PLEASURE IN DOING THINGS: 0
SUM OF ALL RESPONSES TO PHQ QUESTIONS 1-9: 0
SUM OF ALL RESPONSES TO PHQ QUESTIONS 1-9: 0
SUM OF ALL RESPONSES TO PHQ9 QUESTIONS 1 & 2: 0

## 2023-03-31 ASSESSMENT — ENCOUNTER SYMPTOMS
CONSTIPATION: 0
CHEST TIGHTNESS: 0
SHORTNESS OF BREATH: 0
COUGH: 0

## 2023-03-31 NOTE — PROGRESS NOTES
01/27/2023     01/27/2023             This document was generated with the aid of voice recognition software. . Please be aware that there may be inadvertent transcription errors not identified and corrected by the [employer]

## 2023-03-31 NOTE — ASSESSMENT & PLAN NOTE
At goal, continue current medications, medication adherence emphasized and lifestyle modifications recommended   Key Anti-Hypertensive Meds          spironolactone (ALDACTONE) 50 MG tablet (Taking)    Sig - Route:  Take 1 tablet by mouth daily - Oral

## 2023-03-31 NOTE — ASSESSMENT & PLAN NOTE
Borderline controlled, changes made today: will increase lexaprol to 10 mg , advised to take it in am  and medication adherence emphasized.  Will check sleep test to rule out other causes of fatigue and frequent awakenings

## 2023-04-03 PROBLEM — O24.113 PRE-EXISTING TYPE 2 DIABETES MELLITUS IN PREGNANCY IN THIRD TRIMESTER: Status: RESOLVED | Noted: 2017-09-19 | Resolved: 2022-01-05

## 2023-05-25 DIAGNOSIS — I15.8 OTHER SECONDARY HYPERTENSION: ICD-10-CM

## 2023-05-25 RX ORDER — SPIRONOLACTONE 50 MG/1
50 TABLET, FILM COATED ORAL DAILY
Qty: 90 TABLET | Refills: 0 | Status: SHIPPED | OUTPATIENT
Start: 2023-05-25

## 2023-05-30 SDOH — ECONOMIC STABILITY: TRANSPORTATION INSECURITY
IN THE PAST 12 MONTHS, HAS LACK OF TRANSPORTATION KEPT YOU FROM MEETINGS, WORK, OR FROM GETTING THINGS NEEDED FOR DAILY LIVING?: NO

## 2023-05-30 SDOH — ECONOMIC STABILITY: FOOD INSECURITY: WITHIN THE PAST 12 MONTHS, THE FOOD YOU BOUGHT JUST DIDN'T LAST AND YOU DIDN'T HAVE MONEY TO GET MORE.: NEVER TRUE

## 2023-05-30 SDOH — ECONOMIC STABILITY: INCOME INSECURITY: HOW HARD IS IT FOR YOU TO PAY FOR THE VERY BASICS LIKE FOOD, HOUSING, MEDICAL CARE, AND HEATING?: NOT VERY HARD

## 2023-05-30 SDOH — ECONOMIC STABILITY: FOOD INSECURITY: WITHIN THE PAST 12 MONTHS, YOU WORRIED THAT YOUR FOOD WOULD RUN OUT BEFORE YOU GOT MONEY TO BUY MORE.: NEVER TRUE

## 2023-05-31 ENCOUNTER — OFFICE VISIT (OUTPATIENT)
Dept: INTERNAL MEDICINE CLINIC | Facility: CLINIC | Age: 38
End: 2023-05-31
Payer: COMMERCIAL

## 2023-05-31 VITALS
SYSTOLIC BLOOD PRESSURE: 150 MMHG | BODY MASS INDEX: 48.9 KG/M2 | DIASTOLIC BLOOD PRESSURE: 82 MMHG | WEIGHT: 276 LBS | HEIGHT: 63 IN

## 2023-05-31 DIAGNOSIS — G56.03 BILATERAL CARPAL TUNNEL SYNDROME: ICD-10-CM

## 2023-05-31 DIAGNOSIS — Z72.0 TOBACCO ABUSE: Primary | ICD-10-CM

## 2023-05-31 DIAGNOSIS — F41.9 ANXIETY: ICD-10-CM

## 2023-05-31 DIAGNOSIS — I15.8 OTHER SECONDARY HYPERTENSION: ICD-10-CM

## 2023-05-31 PROCEDURE — 99214 OFFICE O/P EST MOD 30 MIN: CPT | Performed by: INTERNAL MEDICINE

## 2023-05-31 RX ORDER — BUPROPION HYDROCHLORIDE 150 MG/1
150 TABLET ORAL EVERY MORNING
Qty: 30 TABLET | Refills: 0 | Status: SHIPPED | OUTPATIENT
Start: 2023-05-31

## 2023-05-31 RX ORDER — ESCITALOPRAM OXALATE 5 MG/1
5 TABLET ORAL DAILY
Qty: 90 TABLET | Refills: 0 | Status: SHIPPED | OUTPATIENT
Start: 2023-05-31

## 2023-05-31 RX ORDER — SPIRONOLACTONE 50 MG/1
50 TABLET, FILM COATED ORAL DAILY
Qty: 90 TABLET | Refills: 1 | Status: SHIPPED | OUTPATIENT
Start: 2023-05-31

## 2023-05-31 ASSESSMENT — ENCOUNTER SYMPTOMS
SHORTNESS OF BREATH: 0
CHEST TIGHTNESS: 0
ABDOMINAL PAIN: 0
CONSTIPATION: 0
ABDOMINAL DISTENTION: 0
COUGH: 0

## 2023-05-31 ASSESSMENT — PATIENT HEALTH QUESTIONNAIRE - PHQ9
SUM OF ALL RESPONSES TO PHQ QUESTIONS 1-9: 0
1. LITTLE INTEREST OR PLEASURE IN DOING THINGS: 0
SUM OF ALL RESPONSES TO PHQ9 QUESTIONS 1 & 2: 0
SUM OF ALL RESPONSES TO PHQ QUESTIONS 1-9: 0
2. FEELING DOWN, DEPRESSED OR HOPELESS: 0
SUM OF ALL RESPONSES TO PHQ QUESTIONS 1-9: 0
SUM OF ALL RESPONSES TO PHQ QUESTIONS 1-9: 0

## 2023-05-31 NOTE — PROGRESS NOTES
0.108 (L) 10/05/2021       Lab Results   Component Value Date     01/27/2023    K 4.1 01/27/2023     01/27/2023    CO2 25 01/27/2023    BUN 11 01/27/2023    CREATININE 0.60 01/27/2023    GLUCOSE 99 01/27/2023    CALCIUM 9.5 01/27/2023    PROT 7.5 01/27/2023    LABALBU 3.8 01/27/2023    BILITOT 0.5 01/27/2023    ALKPHOS 39 (L) 01/27/2023    AST 7 (L) 01/27/2023    ALT 22 01/27/2023    LABGLOM >60 01/27/2023    GFRAA >60 04/20/2022    AGRATIO 0.7 (L) 04/20/2022    GLOB 3.7 01/27/2023       Lab Results   Component Value Date    WBC 10.6 01/27/2023    HGB 14.9 01/27/2023    HCT 45.4 01/27/2023    MCV 86.6 01/27/2023     01/27/2023             This document was generated with the aid of voice recognition software. . Please be aware that there may be inadvertent transcription errors not identified and corrected by the [employer]

## 2023-05-31 NOTE — ASSESSMENT & PLAN NOTE
Borderline controlled, continue current medications and medication adherence emphasized   Key Anti-Hypertensive Meds          spironolactone (ALDACTONE) 50 MG tablet (Taking)    Sig - Route:  Take 1 tablet by mouth daily - Oral

## 2023-05-31 NOTE — ASSESSMENT & PLAN NOTE
Tobacco cessation was discussed with patient. Patient was instructed about harms of smoking, benefits of quitting. Possible alternatives like counseling, coaching programs, support of friends and family, and/or medications  At least 3-5 minutes spend in counseling.     Will try adding wellbutrim

## 2023-05-31 NOTE — ASSESSMENT & PLAN NOTE
Uncontrolled, changes made today:  We will decrease Lexapro to 5 mg, start Wellbutrin 150 mg, medication adherence emphasized and lifestyle modifications recommended follow up in 6-8 weeks

## 2023-06-26 DIAGNOSIS — Z72.0 TOBACCO ABUSE: ICD-10-CM

## 2023-06-26 DIAGNOSIS — F41.9 ANXIETY: ICD-10-CM

## 2023-06-26 RX ORDER — BUPROPION HYDROCHLORIDE 100 MG/1
100 TABLET, EXTENDED RELEASE ORAL 2 TIMES DAILY
Qty: 60 TABLET | Refills: 3 | Status: SHIPPED | OUTPATIENT
Start: 2023-06-26

## 2023-06-26 RX ORDER — BUPROPION HYDROCHLORIDE 150 MG/1
150 TABLET ORAL EVERY MORNING
Qty: 30 TABLET | Refills: 0 | OUTPATIENT
Start: 2023-06-26

## 2023-06-26 NOTE — TELEPHONE ENCOUNTER
Requested Prescriptions     Pending Prescriptions Disp Refills    buPROPion (WELLBUTRIN XL) 150 MG extended release tablet [Pharmacy Med Name: BUPROPION HCL  MG TABLET] 30 tablet 0     Sig: TAKE 1 TABLET BY MOUTH EVERY DAY IN THE MORNING

## 2023-06-26 NOTE — TELEPHONE ENCOUNTER
I am sending Wellbutrin 100 mg twice a day, instead of the 150 extended release and see if it is absorbed better for you  Please let her know

## 2023-07-18 ENCOUNTER — OFFICE VISIT (OUTPATIENT)
Dept: INTERNAL MEDICINE CLINIC | Facility: CLINIC | Age: 38
End: 2023-07-18
Payer: COMMERCIAL

## 2023-07-18 VITALS
SYSTOLIC BLOOD PRESSURE: 154 MMHG | HEART RATE: 78 BPM | HEIGHT: 63 IN | OXYGEN SATURATION: 98 % | WEIGHT: 277.8 LBS | BODY MASS INDEX: 49.22 KG/M2 | DIASTOLIC BLOOD PRESSURE: 100 MMHG

## 2023-07-18 DIAGNOSIS — I15.8 OTHER SECONDARY HYPERTENSION: Primary | ICD-10-CM

## 2023-07-18 DIAGNOSIS — Z72.0 TOBACCO ABUSE: ICD-10-CM

## 2023-07-18 DIAGNOSIS — F41.9 ANXIETY: ICD-10-CM

## 2023-07-18 PROCEDURE — 99214 OFFICE O/P EST MOD 30 MIN: CPT | Performed by: INTERNAL MEDICINE

## 2023-07-18 RX ORDER — LOSARTAN POTASSIUM 25 MG/1
25 TABLET ORAL DAILY
Qty: 30 TABLET | Refills: 1 | Status: SHIPPED | OUTPATIENT
Start: 2023-07-18

## 2023-07-18 RX ORDER — VENLAFAXINE HYDROCHLORIDE 37.5 MG/1
37.5 CAPSULE, EXTENDED RELEASE ORAL DAILY
Qty: 30 CAPSULE | Refills: 1 | Status: SHIPPED | OUTPATIENT
Start: 2023-07-18

## 2023-07-18 RX ORDER — ESCITALOPRAM OXALATE 5 MG/1
5 TABLET ORAL DAILY
Qty: 90 TABLET | Refills: 1 | Status: CANCELLED | OUTPATIENT
Start: 2023-07-18

## 2023-07-18 ASSESSMENT — ENCOUNTER SYMPTOMS
ABDOMINAL PAIN: 0
CHEST TIGHTNESS: 0
ABDOMINAL DISTENTION: 0
CONSTIPATION: 0
COUGH: 0
SHORTNESS OF BREATH: 0

## 2023-07-18 NOTE — ASSESSMENT & PLAN NOTE
Uncontrolled, changes made today: We discussed about changing medication from Lexapro to Effexor, she will continue with low-dose of Wellbutrin, and will start process to refer to psychiatrist., medication adherence emphasized and lifestyle modifications recommended

## 2023-07-18 NOTE — ASSESSMENT & PLAN NOTE
Uncontrolled, changes made today: Will add losartan, patient was instructed to check BMP before the next appointment, as we need to monitor her potassium due to the combination of spironolactone and losartan. We will follow-up again in 6 to 8 weeks, medication adherence emphasized and lifestyle modifications recommended   Key Anti-Hypertensive Meds          losartan (COZAAR) 25 MG tablet (Taking)    Sig - Route: Take 1 tablet by mouth daily - Oral    spironolactone (ALDACTONE) 50 MG tablet (Taking)    Sig - Route:  Take 1 tablet by mouth daily - Oral

## 2023-08-09 DIAGNOSIS — I15.8 OTHER SECONDARY HYPERTENSION: ICD-10-CM

## 2023-08-09 RX ORDER — LOSARTAN POTASSIUM 25 MG/1
TABLET ORAL
Qty: 30 TABLET | Refills: 1 | Status: SHIPPED | OUTPATIENT
Start: 2023-08-09

## 2023-08-09 NOTE — TELEPHONE ENCOUNTER
Requested Prescriptions     Pending Prescriptions Disp Refills    losartan (COZAAR) 25 MG tablet [Pharmacy Med Name: LOSARTAN POTASSIUM 25 MG TAB] 30 tablet 1     Sig: TAKE 1 TABLET BY MOUTH EVERY DAY

## 2023-08-22 ENCOUNTER — OFFICE VISIT (OUTPATIENT)
Dept: INTERNAL MEDICINE CLINIC | Facility: CLINIC | Age: 38
End: 2023-08-22
Payer: COMMERCIAL

## 2023-08-22 VITALS
BODY MASS INDEX: 48.44 KG/M2 | SYSTOLIC BLOOD PRESSURE: 136 MMHG | HEART RATE: 93 BPM | DIASTOLIC BLOOD PRESSURE: 84 MMHG | WEIGHT: 273.4 LBS | HEIGHT: 63 IN | OXYGEN SATURATION: 97 %

## 2023-08-22 DIAGNOSIS — I15.8 OTHER SECONDARY HYPERTENSION: Primary | ICD-10-CM

## 2023-08-22 DIAGNOSIS — E66.01 CLASS 3 SEVERE OBESITY DUE TO EXCESS CALORIES WITHOUT SERIOUS COMORBIDITY WITH BODY MASS INDEX (BMI) OF 45.0 TO 49.9 IN ADULT (HCC): ICD-10-CM

## 2023-08-22 DIAGNOSIS — F41.9 ANXIETY: ICD-10-CM

## 2023-08-22 PROCEDURE — 99214 OFFICE O/P EST MOD 30 MIN: CPT | Performed by: INTERNAL MEDICINE

## 2023-08-22 RX ORDER — PHENTERMINE HYDROCHLORIDE 37.5 MG/1
37.5 TABLET ORAL
Qty: 30 TABLET | Refills: 1 | Status: SHIPPED | OUTPATIENT
Start: 2023-08-22 | End: 2023-11-21

## 2023-08-22 ASSESSMENT — ENCOUNTER SYMPTOMS
CHEST TIGHTNESS: 0
ABDOMINAL PAIN: 0
COUGH: 0
CONSTIPATION: 0
ABDOMINAL DISTENTION: 0
SHORTNESS OF BREATH: 0

## 2023-08-22 NOTE — ASSESSMENT & PLAN NOTE
At goal, continue current medications, medication adherence emphasized and lifestyle modifications recommended   Key Anti-Hypertensive Meds          losartan (COZAAR) 25 MG tablet (Taking)    Sig: TAKE 1 TABLET BY MOUTH EVERY DAY    spironolactone (ALDACTONE) 50 MG tablet (Taking)    Sig - Route:  Take 1 tablet by mouth daily - Oral

## 2023-08-22 NOTE — PROGRESS NOTES
Chief Complaint   Patient presents with    Follow-up     8 WEEK F/U. Cory Lima is a 41 y.o. female who presents today for Follow-up (8 WEEK F/U.)     She is here for her 6 weeks follow-up she tolerated Effexor, but not noticed any changes in regards her mood and anxiety depression, she was referred to psychiatry and counseling, has not yet made the appointment, but is in the process of reaching out. She denies any worsening of depression, has decided walking, and making some changes in diet, was able to lose some pounds,  She is tolerating Wellbutrin, last time we added losartan for blood pressure reports better readings at home but is still borderline high,  She would like to discuss options in regards to medication management for weight loss,  She reports her main issue is increasing appetite, but is trying also to make changes in regards to exercise and physical activity    Wt Readings from Last 3 Encounters:   08/22/23 273 lb 6.4 oz (124 kg)   07/18/23 277 lb 12.8 oz (126 kg)   05/31/23 276 lb (125.2 kg)     Vitals:    08/22/23 0946   BP: 136/84   Site: Right Upper Arm   Position: Sitting   Pulse: 93   SpO2: 97%   Weight: 273 lb 6.4 oz (124 kg)   Height: 5' 3\" (1.6 m)        Assessment and plan:  1. Other secondary hypertension  Assessment & Plan:   At goal, continue current medications, medication adherence emphasized and lifestyle modifications recommended   Key Anti-Hypertensive Meds            losartan (COZAAR) 25 MG tablet (Taking)    Sig: TAKE 1 TABLET BY MOUTH EVERY DAY    spironolactone (ALDACTONE) 50 MG tablet (Taking)    Sig - Route:  Take 1 tablet by mouth daily - Oral            2. Class 3 severe obesity due to excess calories without serious comorbidity with body mass index (BMI) of 45.0 to 49.9 in adult [facility])  Assessment & Plan:      Discussed with patient   About  Weight  Loss  Recommendations and  That  medications are Used on:   Chronic weight management, as an adjunct to a

## 2023-08-22 NOTE — ASSESSMENT & PLAN NOTE
Discussed with patient   About  Weight  Loss  Recommendations and  That  medications are Used on:   Chronic weight management, as an adjunct to a reduced-calorie diet and increased physical activity,evaluate response by week 12; if patient has not lost =5% of baseline body weight, discontinue therapy    Increase walking and physical activity  We discussed about low carb diets  And keto

## 2023-08-22 NOTE — ASSESSMENT & PLAN NOTE
Borderline controlled, changes made today: stop effexor , medication adherence emphasized and lifestyle modifications recommended   Continue Wellbutrin  Advised to make follow up with [facility]

## 2023-09-06 DIAGNOSIS — I15.8 OTHER SECONDARY HYPERTENSION: ICD-10-CM

## 2023-09-06 RX ORDER — LOSARTAN POTASSIUM 25 MG/1
TABLET ORAL
Qty: 30 TABLET | Refills: 1 | Status: SHIPPED | OUTPATIENT
Start: 2023-09-06

## 2023-09-10 DIAGNOSIS — F41.9 ANXIETY: ICD-10-CM

## 2023-09-13 RX ORDER — VENLAFAXINE HYDROCHLORIDE 37.5 MG/1
CAPSULE, EXTENDED RELEASE ORAL DAILY
Qty: 30 CAPSULE | Refills: 1 | OUTPATIENT
Start: 2023-09-13

## 2023-09-19 NOTE — TELEPHONE ENCOUNTER
Phone call to patient at [phone].   No answer; message left.     Justina Pugh, [address] [facility] Bilateral Rotation Flap Text: The defect edges were debeveled with a #15 scalpel blade. Given the location of the defect, shape of the defect and the proximity to free margins a bilateral rotation flap was deemed most appropriate. Using a sterile surgical marker, an appropriate rotation flap was drawn incorporating the defect and placing the expected incisions within the relaxed skin tension lines where possible. The area thus outlined was incised deep to adipose tissue with a #15 scalpel blade. The skin margins were undermined to an appropriate distance in all directions utilizing iris scissors. Following this, the designed flap was carried over into the primary defect and sutured into place.

## 2023-10-17 ENCOUNTER — OFFICE VISIT (OUTPATIENT)
Dept: INTERNAL MEDICINE CLINIC | Facility: CLINIC | Age: 38
End: 2023-10-17
Payer: COMMERCIAL

## 2023-10-17 VITALS
SYSTOLIC BLOOD PRESSURE: 144 MMHG | OXYGEN SATURATION: 100 % | HEART RATE: 96 BPM | HEIGHT: 63 IN | BODY MASS INDEX: 47.84 KG/M2 | DIASTOLIC BLOOD PRESSURE: 86 MMHG | WEIGHT: 270 LBS

## 2023-10-17 DIAGNOSIS — E66.01 CLASS 3 SEVERE OBESITY DUE TO EXCESS CALORIES WITHOUT SERIOUS COMORBIDITY WITH BODY MASS INDEX (BMI) OF 45.0 TO 49.9 IN ADULT (HCC): ICD-10-CM

## 2023-10-17 DIAGNOSIS — F41.9 ANXIETY: ICD-10-CM

## 2023-10-17 DIAGNOSIS — M25.521 RIGHT ELBOW PAIN: ICD-10-CM

## 2023-10-17 DIAGNOSIS — M25.512 PAIN IN JOINT OF LEFT SHOULDER: Primary | ICD-10-CM

## 2023-10-17 DIAGNOSIS — Z72.0 TOBACCO ABUSE: ICD-10-CM

## 2023-10-17 DIAGNOSIS — I15.8 OTHER SECONDARY HYPERTENSION: ICD-10-CM

## 2023-10-17 PROCEDURE — 99214 OFFICE O/P EST MOD 30 MIN: CPT | Performed by: INTERNAL MEDICINE

## 2023-10-17 RX ORDER — VALSARTAN 80 MG/1
80 TABLET ORAL DAILY
Qty: 90 TABLET | Refills: 0 | Status: SHIPPED | OUTPATIENT
Start: 2023-10-17

## 2023-10-17 RX ORDER — MELOXICAM 15 MG/1
15 TABLET ORAL DAILY PRN
Qty: 30 TABLET | Refills: 1 | Status: SHIPPED | OUTPATIENT
Start: 2023-10-17

## 2023-10-17 RX ORDER — PHENTERMINE HYDROCHLORIDE 37.5 MG/1
37.5 TABLET ORAL
Qty: 30 TABLET | Refills: 1 | Status: SHIPPED | OUTPATIENT
Start: 2023-10-17 | End: 2024-01-16

## 2023-10-17 RX ORDER — BUPROPION HYDROCHLORIDE 100 MG/1
100 TABLET, EXTENDED RELEASE ORAL 2 TIMES DAILY
Qty: 180 TABLET | Refills: 1 | Status: SHIPPED | OUTPATIENT
Start: 2023-10-17

## 2023-10-17 RX ORDER — SPIRONOLACTONE 50 MG/1
50 TABLET, FILM COATED ORAL DAILY
Qty: 90 TABLET | Refills: 1 | Status: SHIPPED | OUTPATIENT
Start: 2023-10-17

## 2023-10-17 ASSESSMENT — ENCOUNTER SYMPTOMS
COUGH: 0
ABDOMINAL DISTENTION: 0
ABDOMINAL PAIN: 0
SHORTNESS OF BREATH: 0
CONSTIPATION: 0
CHEST TIGHTNESS: 0

## 2023-10-17 NOTE — PROGRESS NOTES
Chief Complaint   Patient presents with    Follow-up     8 week f/u. HTN, Weight management. Naldo Bond is a 41 y.o. female who presents today for Follow-up (8 week f/u. HTN, Weight management.)     She is here for her follow-up in obesity, hypertension, anxiety,  Has been tolerating phentermine, occasional headaches, she reports headaches as sharp, on and off, has been checking her blood pressure at home and has been within normal limits,  She denies any aberrant behaviors, taking the medications as prescribed,  Has been able to lose few pounds, since last visit,  She also is concerned about shoulder pain and elbow pain, this started few weeks ago, getting very intense, is affecting her daily activities, work and shortness at home. She has been trying multiple medications over-the-counter  Increase stress , now her step son lives with her  Report  R elbow , Left shoulder pain, has tried tylenol , biofrezee, Ibuprofen ,        Wt Readings from Last 3 Encounters:   10/17/23 270 lb (122.5 kg)   08/22/23 273 lb 6.4 oz (124 kg)   07/18/23 277 lb 12.8 oz (126 kg)     Vitals:    10/17/23 0832   BP: (!) 144/86   Site: Left Upper Arm   Position: Sitting   Pulse: 96   SpO2: 100%   Weight: 270 lb (122.5 kg)   Height: 5' 3\" (1.6 m)        Assessment and plan:  1. Pain in joint of left shoulder  -     meloxicam (MOBIC) 15 MG tablet; Take 1 tablet by mouth daily as needed for Pain, Disp-30 tablet, R-1Normal  2. Anxiety  -     buPROPion (WELLBUTRIN SR) 100 MG extended release tablet; Take 1 tablet by mouth 2 times daily, Disp-180 tablet, R-1Normal  3. Tobacco abuse  -     buPROPion (WELLBUTRIN SR) 100 MG extended release tablet; Take 1 tablet by mouth 2 times daily, Disp-180 tablet, R-1Normal  4. Other secondary hypertension  -     spironolactone (ALDACTONE) 50 MG tablet; Take 1 tablet by mouth daily, Disp-90 tablet, R-1Normal  -     valsartan (DIOVAN) 80 MG tablet;  Take 1 tablet by mouth daily, Disp-90

## 2023-11-28 ENCOUNTER — HOSPITAL ENCOUNTER (EMERGENCY)
Age: 38
Discharge: HOME | End: 2023-11-29
Attending: GENERAL PRACTICE
Payer: COMMERCIAL

## 2023-11-28 ENCOUNTER — APPOINTMENT (OUTPATIENT)
Dept: GENERAL RADIOLOGY | Age: 38
End: 2023-11-28
Payer: COMMERCIAL

## 2023-11-28 DIAGNOSIS — J20.9 BRONCHOSPASM WITH BRONCHITIS, ACUTE: Primary | ICD-10-CM

## 2023-11-28 LAB
ALBUMIN SERPL-MCNC: 3.5 G/DL (ref 3.5–5)
ALBUMIN/GLOB SERPL: 0.9 (ref 0.4–1.6)
ALP SERPL-CCNC: 53 U/L (ref 50–136)
ALT SERPL-CCNC: 39 U/L (ref 12–65)
ANION GAP SERPL CALC-SCNC: 7 MMOL/L (ref 2–11)
AST SERPL-CCNC: 11 U/L (ref 15–37)
BASOPHILS # BLD: 0 K/UL (ref 0–0.2)
BASOPHILS NFR BLD: 0 % (ref 0–2)
BILIRUB SERPL-MCNC: 0.2 MG/DL (ref 0.2–1.1)
BUN SERPL-MCNC: 12 MG/DL (ref 6–23)
CALCIUM SERPL-MCNC: 8.8 MG/DL (ref 8.3–10.4)
CHLORIDE SERPL-SCNC: 108 MMOL/L (ref 101–110)
CO2 SERPL-SCNC: 24 MMOL/L (ref 21–32)
CREAT SERPL-MCNC: 0.86 MG/DL (ref 0.6–1)
DIFFERENTIAL METHOD BLD: ABNORMAL
EOSINOPHIL # BLD: 0 K/UL (ref 0–0.8)
EOSINOPHIL NFR BLD: 0 % (ref 0.5–7.8)
ERYTHROCYTE [DISTWIDTH] IN BLOOD BY AUTOMATED COUNT: 13.1 % (ref 11.9–14.6)
GLOBULIN SER CALC-MCNC: 4.1 G/DL (ref 2.8–4.5)
GLUCOSE SERPL-MCNC: 188 MG/DL (ref 65–100)
HCT VFR BLD AUTO: 37.8 % (ref 35.8–46.3)
HGB BLD-MCNC: 12.6 G/DL (ref 11.7–15.4)
IMM GRANULOCYTES # BLD AUTO: 0 K/UL (ref 0–0.5)
IMM GRANULOCYTES NFR BLD AUTO: 0 % (ref 0–5)
LYMPHOCYTES # BLD: 1.4 K/UL (ref 0.5–4.6)
LYMPHOCYTES NFR BLD: 10 % (ref 13–44)
MAGNESIUM SERPL-MCNC: 2.1 MG/DL (ref 1.8–2.4)
MCH RBC QN AUTO: 29.2 PG (ref 26.1–32.9)
MCHC RBC AUTO-ENTMCNC: 33.3 G/DL (ref 31.4–35)
MCV RBC AUTO: 87.5 FL (ref 82–102)
MONOCYTES # BLD: 0.1 K/UL (ref 0.1–1.3)
MONOCYTES NFR BLD: 1 % (ref 4–12)
NEUTS SEG # BLD: 11.9 K/UL (ref 1.7–8.2)
NEUTS SEG NFR BLD: 89 % (ref 43–78)
NRBC # BLD: 0 K/UL (ref 0–0.2)
PLATELET # BLD AUTO: 352 K/UL (ref 150–450)
PMV BLD AUTO: 8.8 FL (ref 9.4–12.3)
POTASSIUM SERPL-SCNC: 4 MMOL/L (ref 3.5–5.1)
PROT SERPL-MCNC: 7.6 G/DL (ref 6.3–8.2)
RBC # BLD AUTO: 4.32 M/UL (ref 4.05–5.2)
SODIUM SERPL-SCNC: 139 MMOL/L (ref 133–143)
WBC # BLD AUTO: 13.5 K/UL (ref 4.3–11.1)

## 2023-11-28 PROCEDURE — 83735 ASSAY OF MAGNESIUM: CPT

## 2023-11-28 PROCEDURE — 71045 X-RAY EXAM CHEST 1 VIEW: CPT

## 2023-11-28 PROCEDURE — 93005 ELECTROCARDIOGRAM TRACING: CPT | Performed by: GENERAL PRACTICE

## 2023-11-28 PROCEDURE — 80053 COMPREHEN METABOLIC PANEL: CPT

## 2023-11-28 PROCEDURE — 85025 COMPLETE CBC W/AUTO DIFF WBC: CPT

## 2023-11-28 PROCEDURE — 99285 EMERGENCY DEPT VISIT HI MDM: CPT

## 2023-11-28 RX ORDER — PREDNISONE 20 MG/1
20 TABLET ORAL 2 TIMES DAILY
Qty: 8 TABLET | Refills: 0 | Status: SHIPPED | OUTPATIENT
Start: 2023-11-28 | End: 2023-12-02

## 2023-11-29 VITALS
OXYGEN SATURATION: 97 % | SYSTOLIC BLOOD PRESSURE: 166 MMHG | WEIGHT: 260 LBS | TEMPERATURE: 98.1 F | DIASTOLIC BLOOD PRESSURE: 88 MMHG | BODY MASS INDEX: 46.07 KG/M2 | HEART RATE: 107 BPM | HEIGHT: 63 IN | RESPIRATION RATE: 20 BRPM

## 2023-11-29 LAB
EKG ATRIAL RATE: 101 BPM
EKG DIAGNOSIS: NORMAL
EKG P AXIS: 67 DEGREES
EKG P-R INTERVAL: 136 MS
EKG Q-T INTERVAL: 324 MS
EKG QRS DURATION: 86 MS
EKG QTC CALCULATION (BAZETT): 420 MS
EKG R AXIS: 102 DEGREES
EKG T AXIS: 33 DEGREES
EKG VENTRICULAR RATE: 101 BPM

## 2023-11-29 NOTE — ED TRIAGE NOTES
Pt c/o sob for a few days with a cough. Pt seen at urgent this afternoon and given two albuterol treatments and IM decadron. Per pt they said she still sounded bad so they sent her here.

## 2023-11-29 NOTE — ED PROVIDER NOTES
Procedures    XR CHEST PORTABLE    CBC with Auto Differential    Comprehensive Metabolic Panel    Magnesium    Cardiac Monitor - ED Only    Continuous Pulse Oximetry    EKG 12 Lead    Saline lock IV        Medications given during this emergency department visit:  Medications - No data to display    New Prescriptions    PREDNISONE (DELTASONE) 20 MG TABLET    Take 1 tablet by mouth 2 times daily for 4 days        Past Medical History:   Diagnosis Date    Asthma     prn inhaler    Cholelithiasis with cholecystitis 11/24/2015    GERD (gastroesophageal reflux disease)     controlled with medication    Gestational diabetes 2017    Gestational hypertension     History of miscarriage 7/13/2014    Infertility, female     Insulin controlled gestational diabetes mellitus (GDM) in third trimester 1/28/2022    . .. 3/10/22: Resent script to [facility] [address] [place] [address], [place] Pt to continue  Metformin ER 1000 mg BID and start LA Insulin 8/8. 3/18/22:Continue Metformin ER 1000 BID and Lantus 8/8.  3/25/2022 at [facility]: Excellent BS control and appropriate fetal growth. Most readings WNL. Continue Metformin ER 1000 mg BID and Lantus 8/8. 4/2/22: Log reviewed, most readings WNL with some fluctuating elevations,  w    Morbid obesity (HCC)     bmi =51    Nausea & vomiting     with every surg--3-4 times-- then ok per pt    Polycystic disease, ovaries     Postpartum hypertension 4/20/2022    Pre-existing type 2 diabetes mellitus in pregnancy in third trimester 9/19/2017 12/12/2017 at [facility]:  Appropriate fetal growth, reassuring fetal status. AC 17%, overall 22%, DARIUS 17.8 cm, UA Dopplers WNL, BPP 8/8. Glucose log reviewed. Ranges from 77 to 164. Several PP's elevated. Reviewed diet and suggested increase in protein. Loosen PP parameters <140. Current Dose is now Levemir 16/16 units and Humalog 20/20/20.    Follow up at [facility] in 3 weeks for fetal growth and BPP and     Pregnancy induced hypertension, antepartum 12/22/2017    Psychiatric

## 2023-12-09 DIAGNOSIS — M25.521 RIGHT ELBOW PAIN: ICD-10-CM

## 2023-12-09 DIAGNOSIS — M25.512 PAIN IN JOINT OF LEFT SHOULDER: ICD-10-CM

## 2023-12-11 RX ORDER — MELOXICAM 15 MG/1
15 TABLET ORAL DAILY PRN
Qty: 30 TABLET | Refills: 1 | Status: SHIPPED | OUTPATIENT
Start: 2023-12-11

## 2023-12-19 PROBLEM — J40 BRONCHITIS: Status: ACTIVE | Noted: 2023-11-29

## 2023-12-28 ENCOUNTER — PATIENT MESSAGE (OUTPATIENT)
Dept: INTERNAL MEDICINE CLINIC | Facility: CLINIC | Age: 38
End: 2023-12-28

## 2024-01-12 DIAGNOSIS — I15.8 OTHER SECONDARY HYPERTENSION: ICD-10-CM

## 2024-01-12 RX ORDER — VALSARTAN 80 MG/1
80 TABLET ORAL DAILY
Qty: 90 TABLET | Refills: 0 | Status: SHIPPED | OUTPATIENT
Start: 2024-01-12

## 2024-01-12 NOTE — TELEPHONE ENCOUNTER
Requested Prescriptions     Pending Prescriptions Disp Refills    valsartan (DIOVAN) 80 MG tablet [Pharmacy Med Name: VALSARTAN 80 MG TABLET] 90 tablet 0     Sig: TAKE 1 TABLET BY MOUTH EVERY DAY

## 2024-01-12 NOTE — TELEPHONE ENCOUNTER
Requested Prescriptions     Pending Prescriptions Disp Refills    valsartan (DIOVAN) 80 MG tablet [Pharmacy Med Name: VALSARTAN 80 MG TABLET] 90 tablet 0     Sig: TAKE 1 TABLET BY MOUTH EVERY DAY   Pt requesting refill. Pharmacy confirmed. Next OV 02/20/2024

## 2024-01-15 ENCOUNTER — HOSPITAL ENCOUNTER (OUTPATIENT)
Dept: SLEEP CENTER | Age: 39
Discharge: HOME | End: 2024-01-18

## 2024-02-20 ENCOUNTER — HOSPITAL ENCOUNTER (OUTPATIENT)
Dept: SLEEP MEDICINE | Age: 39
Discharge: HOME | End: 2024-02-23

## 2024-02-28 ENCOUNTER — TELEPHONE (OUTPATIENT)
Dept: SLEEP MEDICINE | Age: 39
End: 2024-02-28

## 2024-03-08 NOTE — PROGRESS NOTES
[facility]  [address]  [place] [address]  [phone]    Patient Name:  Marilu Stanton  YOB: 1985      Office Visit 3/11/2024    CHIEF COMPLAINT:    Chief Complaint   Patient presents with    Sleep Apnea    New Patient         HISTORY OF PRESENT ILLNESS:  Patient is an 38 y.o. female seen today for new pt evaluation. Pt had a PSG/HST on 2/20/24 with an AHI of 13.7/hr with desaturations to 78%. Pt had a CPAP titration study on 2/20/24 and pt's sleep disordered breathing improved with CPAP 9cm H2O.  Pt is accompanied by her son and daughter. Pt reports that she has had issues with snoring for years. She feels like she has not had a good night's sleep in years. She does wake up frequently during the night. She does have a baby who is not quite 2 years old, so that does not help her sleep issues.   She does wake up occasionally with a headache. She will nap on days when she can while her daughter is napping. 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 She works 7am to 7:30pm. She does not swing shifts.  She goes to bed around 10-11pm and gets up at 5:15am on her work days. She gets up at 6:30am on her days off to get her 7 yo son to school. She is  and lives with her , her 19 yo son, 7 yo son, and less than 1 yo daughter.   We discussed the pathophysiology of sleep apnea, risks of untreated sleep apnea, and reviewed her sleep studies. We discussed the recommendations for pt to start PAP therapy. Pt is agreeable and orders sent to [facility]. She is aware of the importance of compliance.         3/11/2024    11:59 AM   Sleep Medicine   Sitting and reading 3   Watching TV 3   Sitting, inactive in a public place (e.g. a theatre or a meeting) 1   As a passenger in a car for an hour without a break 1   Lying down to rest in the afternoon when circumstances permit 3   Sitting and talking to someone 0   Sitting quietly after a lunch without

## 2024-03-11 ENCOUNTER — OFFICE VISIT (OUTPATIENT)
Dept: SLEEP MEDICINE | Age: 39
End: 2024-03-11
Payer: COMMERCIAL

## 2024-03-11 VITALS
HEIGHT: 63 IN | DIASTOLIC BLOOD PRESSURE: 78 MMHG | OXYGEN SATURATION: 94 % | BODY MASS INDEX: 49.26 KG/M2 | RESPIRATION RATE: 16 BRPM | SYSTOLIC BLOOD PRESSURE: 130 MMHG | WEIGHT: 278 LBS | HEART RATE: 110 BPM

## 2024-03-11 DIAGNOSIS — G47.33 OSA (OBSTRUCTIVE SLEEP APNEA): Primary | ICD-10-CM

## 2024-03-11 DIAGNOSIS — G47.34 NOCTURNAL HYPOXEMIA: ICD-10-CM

## 2024-03-11 PROCEDURE — 99203 OFFICE O/P NEW LOW 30 MIN: CPT | Performed by: PHYSICIAN ASSISTANT

## 2024-03-11 ASSESSMENT — SLEEP AND FATIGUE QUESTIONNAIRES
HOW LIKELY ARE YOU TO NOD OFF OR FALL ASLEEP WHILE LYING DOWN TO REST IN THE AFTERNOON WHEN CIRCUMSTANCES PERMIT: 3
HOW LIKELY ARE YOU TO NOD OFF OR FALL ASLEEP WHEN YOU ARE A PASSENGER IN A CAR FOR AN HOUR WITHOUT A BREAK: 1
HOW LIKELY ARE YOU TO NOD OFF OR FALL ASLEEP WHILE SITTING AND READING: 3
HOW LIKELY ARE YOU TO NOD OFF OR FALL ASLEEP WHILE SITTING QUIETLY AFTER LUNCH WITHOUT ALCOHOL: 1
HOW LIKELY ARE YOU TO NOD OFF OR FALL ASLEEP WHILE SITTING AND TALKING TO SOMEONE: 0
HOW LIKELY ARE YOU TO NOD OFF OR FALL ASLEEP WHILE SITTING INACTIVE IN A PUBLIC PLACE: 1
HOW LIKELY ARE YOU TO NOD OFF OR FALL ASLEEP IN A CAR, WHILE STOPPED FOR A FEW MINUTES IN TRAFFIC: 0
ESS TOTAL SCORE: 12
HOW LIKELY ARE YOU TO NOD OFF OR FALL ASLEEP WHILE WATCHING TV: 3

## 2024-03-11 NOTE — PATIENT INSTRUCTIONS
The company who will be taking care of your CPAP supplies is:    Address: [address], [place] [address]  Phone: [phone] Option #1  Fax: [phone]

## 2024-03-19 DIAGNOSIS — I15.8 OTHER SECONDARY HYPERTENSION: ICD-10-CM

## 2024-03-19 RX ORDER — VALSARTAN 80 MG/1
80 TABLET ORAL DAILY
Qty: 90 TABLET | Refills: 0 | Status: SHIPPED | OUTPATIENT
Start: 2024-03-19

## 2024-04-02 RX ORDER — NORETHINDRONE ACETATE/ETHINYL ESTRADIOL AND FERROUS FUMARATE 1.5-30(21)
1 KIT ORAL DAILY
Qty: 84 TABLET | Refills: 0 | OUTPATIENT
Start: 2024-04-02

## 2024-04-22 RX ORDER — NORETHINDRONE ACETATE AND ETHINYL ESTRADIOL 1.5-30(21)
1 KIT ORAL DAILY
Qty: 3 PACKET | Refills: 0 | Status: SHIPPED | OUTPATIENT
Start: 2024-04-22

## 2024-05-17 DIAGNOSIS — I15.8 OTHER SECONDARY HYPERTENSION: ICD-10-CM

## 2024-05-17 RX ORDER — SPIRONOLACTONE 50 MG/1
50 TABLET, FILM COATED ORAL DAILY
Qty: 90 TABLET | Refills: 1 | Status: SHIPPED | OUTPATIENT
Start: 2024-05-17

## 2024-06-04 ENCOUNTER — OFFICE VISIT (OUTPATIENT)
Dept: OBGYN CLINIC | Age: 39
End: 2024-06-04
Payer: COMMERCIAL

## 2024-06-04 VITALS
HEIGHT: 63 IN | SYSTOLIC BLOOD PRESSURE: 142 MMHG | WEIGHT: 277 LBS | BODY MASS INDEX: 49.08 KG/M2 | DIASTOLIC BLOOD PRESSURE: 72 MMHG

## 2024-06-04 DIAGNOSIS — Z12.4 SCREENING FOR CERVICAL CANCER: ICD-10-CM

## 2024-06-04 DIAGNOSIS — Z01.419 WELL WOMAN EXAM WITH ROUTINE GYNECOLOGICAL EXAM: Primary | ICD-10-CM

## 2024-06-04 DIAGNOSIS — Z11.51 SCREENING FOR HUMAN PAPILLOMAVIRUS (HPV): ICD-10-CM

## 2024-06-04 PROCEDURE — 99395 PREV VISIT EST AGE 18-39: CPT | Performed by: OBSTETRICS & GYNECOLOGY

## 2024-06-04 PROCEDURE — 99459 PELVIC EXAMINATION: CPT | Performed by: OBSTETRICS & GYNECOLOGY

## 2024-06-04 RX ORDER — NORETHINDRONE ACETATE AND ETHINYL ESTRADIOL 1.5-30(21)
1 KIT ORAL DAILY
Qty: 3 PACKET | Refills: 4 | Status: SHIPPED | OUTPATIENT
Start: 2024-06-04

## 2024-06-04 SDOH — ECONOMIC STABILITY: INCOME INSECURITY

## 2024-06-04 SDOH — ECONOMIC STABILITY: FOOD INSECURITY: WITHIN THE PAST 12 MONTHS, THE FOOD YOU BOUGHT JUST DIDN'T LAST AND YOU DIDN'T HAVE MONEY TO GET MORE.: NEVER TRUE

## 2024-06-04 SDOH — ECONOMIC STABILITY: HOUSING INSECURITY

## 2024-06-04 SDOH — ECONOMIC STABILITY: INCOME INSECURITY: HOW HARD IS IT FOR YOU TO PAY FOR THE VERY BASICS LIKE FOOD, HOUSING, MEDICAL CARE, AND HEATING?: SOMEWHAT HARD

## 2024-06-04 SDOH — ECONOMIC STABILITY: FOOD INSECURITY: WITHIN THE PAST 12 MONTHS, YOU WORRIED THAT YOUR FOOD WOULD RUN OUT BEFORE YOU GOT MONEY TO BUY MORE.: NEVER TRUE

## 2024-06-04 SDOH — ECONOMIC STABILITY: FOOD INSECURITY

## 2024-06-04 NOTE — PROGRESS NOTES
Cancer Neg Hx     Colon Cancer Neg Hx      BP (!) 142/72   Ht 1.6 m (5' 3\")   Wt 125.6 kg (277 lb)   LMP 05/21/2024   Breastfeeding No   BMI 49.07 kg/m²      ROS:  Constitutional: Negative.    HENT: Negative.     Eyes: Negative.    Respiratory: Negative.     Cardiovascular: Negative.    Gastrointestinal: Negative.    Endocrine: Negative.    Genitourinary: Negative.    Musculoskeletal: Negative.    Allergic/Immunologic: Negative.    Neurological: Negative.    Hematological: Negative.    Psychiatric/Behavioral: Negative.           Physical Exam  Vitals signs reviewed.   Constitutional:       General: She is not in acute distress.     Appearance: Normal appearance. She is well-developed. She is not ill-appearing, toxic-appearing or diaphoretic.   HENT:      Head: Normocephalic and atraumatic.   Eyes:      General: No scleral icterus.     Conjunctiva/sclera: Conjunctivae normal.      Pupils: Pupils are equal, round, and reactive to light.   Neck:      Musculoskeletal: Normal range of motion and neck supple.      Thyroid: No thyromegaly.      Vascular: No JVD.      Trachea: No tracheal deviation.   Cardiovascular:      Rate and Rhythm: Normal rate and regular rhythm.      Heart sounds: Normal heart sounds. No murmur. No friction rub. No gallop.    Pulmonary:      Effort: Pulmonary effort is normal. No respiratory distress.      Breath sounds: Normal breath sounds. No stridor. No wheezing, rhonchi or rales.   Chest:      Chest wall: No tenderness.      Breasts:         Right: No inverted nipple, mass, nipple discharge, skin change or tenderness.         Left: No inverted nipple, mass, nipple discharge, skin change or tenderness.   Abdominal:      General: Bowel sounds are normal. There is no distension.      Palpations: Abdomen is soft. There is no mass.      Tenderness: There is no abdominal tenderness. There is no guarding or rebound.   Genitourinary: Chaperone present for pelvic exam.     Labia:         Right: No

## 2024-06-12 LAB
COLLECTION METHOD: NORMAL
CYTOLOGIST CVX/VAG CYTO: NORMAL
CYTOLOGY CVX/VAG DOC THIN PREP: NORMAL
HPV APTIMA: NEGATIVE
HPV GENOTYPE REFLEX: NORMAL
Lab: NORMAL
PAP SOURCE: NORMAL
PATH REPORT.FINAL DX SPEC: NORMAL
STAT OF ADQ CVX/VAG CYTO-IMP: NORMAL

## 2024-07-12 DIAGNOSIS — I15.8 OTHER SECONDARY HYPERTENSION: ICD-10-CM

## 2024-07-14 RX ORDER — VALSARTAN 80 MG/1
80 TABLET ORAL DAILY
Qty: 90 TABLET | Refills: 0 | Status: SHIPPED | OUTPATIENT
Start: 2024-07-14

## 2024-07-26 ENCOUNTER — OFFICE VISIT (OUTPATIENT)
Dept: SLEEP MEDICINE | Age: 39
End: 2024-07-26
Payer: COMMERCIAL

## 2024-07-26 VITALS
SYSTOLIC BLOOD PRESSURE: 138 MMHG | TEMPERATURE: 97 F | DIASTOLIC BLOOD PRESSURE: 89 MMHG | RESPIRATION RATE: 18 BRPM | BODY MASS INDEX: 41.82 KG/M2 | HEART RATE: 79 BPM | HEIGHT: 63 IN | WEIGHT: 236 LBS | OXYGEN SATURATION: 94 %

## 2024-07-26 DIAGNOSIS — G47.33 OSA (OBSTRUCTIVE SLEEP APNEA): Primary | ICD-10-CM

## 2024-07-26 DIAGNOSIS — G47.34 NOCTURNAL HYPOXEMIA: ICD-10-CM

## 2024-07-26 PROCEDURE — 99213 OFFICE O/P EST LOW 20 MIN: CPT | Performed by: PHYSICIAN ASSISTANT

## 2024-07-26 RX ORDER — FAMOTIDINE 20 MG/1
20 TABLET, FILM COATED ORAL 2 TIMES DAILY
COMMUNITY

## 2024-07-26 NOTE — PROGRESS NOTES
disease)     controlled with medication    Gestational diabetes 2017    Gestational hypertension     History of miscarriage 7/13/2014    Infertility, female     Insulin controlled gestational diabetes mellitus (GDM) in third trimester 1/28/2022    ... 3/10/22: Resent script to [facility]; Pt to continue  Metformin ER 1000 mg BID and start LA Insulin 8/8. 3/18/22:Continue Metformin ER 1000 BID and Lantus 8/8.  3/25/2022 at [facility]: Excellent BS control and appropriate fetal growth.   Most readings WNL. Continue Metformin ER 1000 mg BID and Lantus 8/8. 4/2/22: Log reviewed, most readings WNL with some fluctuating elevations,  w    Morbid obesity (HCC)     bmi =51    Nausea & vomiting     with every surg--3-4 times-- then ok per pt    Polycystic disease, ovaries     Postpartum hypertension 4/20/2022    Pre-existing type 2 diabetes mellitus in pregnancy in third trimester 9/19/2017 12/12/2017 at [facility]:  Appropriate fetal growth, reassuring fetal status.  AC 17%, overall 22%, DARIUS 17.8 cm, UA Dopplers WNL, BPP 8/8. Glucose log reviewed.  Ranges from 77 to 164. Several PP's elevated. Reviewed diet and suggested increase in protein.  Loosen PP parameters <140. Current Dose is now Levemir 16/16 units and Humalog 20/20/20.   Follow up at [facility] in 3 weeks for fetal growth and BPP and     Pregnancy induced hypertension, antepartum 12/22/2017    Psychiatric disorder     anxiety-- per pt-- no tx    PUD (peptic ulcer disease) 2009    no problems since    Severe pre-eclampsia 12/22/2017    IOL at 37wks w/ PreE w/OUT severe features; developed several severe bps on L&D and recurrent severe bps pp; no Mag in labor--will hold for now.  Start Procardia 30 XL BID.       Sleep apnea     ?--with 2014 surg at [facility]--- per pt never has been tested         Patient Active Problem List   Diagnosis    Polycystic ovary complicating pregnancy, antepartum    Tobacco abuse    Other secondary hypertension    Class 3 severe

## 2024-07-26 NOTE — PATIENT INSTRUCTIONS
I would like you to try extended release or time release melatonin. I would like you to start with 5mg tablets. You should take the melatonin 60-90 mins before going to bed. This should help you fall asleep but also help you stay asleep without making you groggy. If 5mg is not enough, you could take 2 tablets to make it 10mg but I would not recommend going higher on your dose. If it is not working, please call or send me a [place] message.

## 2024-08-09 ENCOUNTER — OFFICE VISIT (OUTPATIENT)
Dept: OBGYN CLINIC | Age: 39
End: 2024-08-09

## 2024-08-09 VITALS
HEIGHT: 63 IN | BODY MASS INDEX: 49.61 KG/M2 | WEIGHT: 280 LBS | SYSTOLIC BLOOD PRESSURE: 142 MMHG | DIASTOLIC BLOOD PRESSURE: 90 MMHG

## 2024-08-09 DIAGNOSIS — Z30.017 NEXPLANON INSERTION: Primary | ICD-10-CM

## 2024-08-09 RX ORDER — ETONOGESTREL 68 MG/1
IMPLANT SUBCUTANEOUS
COMMUNITY
Start: 2024-06-17

## 2024-09-03 NOTE — PROGRESS NOTES
8/9/24:    Chief Complaint   Patient presents with    Contraception     Nexplanon placement     Pt presents for nexplanon.    Vitals:    08/09/24 0916   BP: (!) 142/90         8/9/2024     9:16 AM 7/26/2024    11:12 AM 6/4/2024     3:42 PM 3/11/2024    11:50 AM 12/19/2023     8:40 AM 11/28/2023     9:39 PM 10/17/2023     8:32 AM   Weight Metrics   Weight 280 lb 236 lb 277 lb 278 lb 267 lb 9.6 oz 260 lb 270 lb   BMI (Calculated) 49.7 kg/m2 41.9 kg/m2 49.2 kg/m2 49.3 kg/m2 47.5 kg/m2 46.2 kg/m2 47.9 kg/m2     Pt presents today for nexplanon insertion.    Informed consent was obtained with risks, benefits, and alternatives to nexplanon.     UPT neg      Nexplanon Procedure:  Consent was signed and dated.  Area was prepped and draped in the usual sterile manner.  Site was anesthetized with 5 cc of 1% Lidocaine.  1 Nexplanon capsule was inserted with Nexplanon insertion needle.  Area was closed with Steri-Strips.  Arm then wrapped in gauze.  Patient tolerated procedure well.      Bleeding/ER/Follow up instructions were discussed.     RTC in 6wks for nexplanon check up.    1. Nexplanon insertion      \

## 2024-09-23 ENCOUNTER — OFFICE VISIT (OUTPATIENT)
Dept: OBGYN CLINIC | Age: 39
End: 2024-09-23

## 2024-09-23 VITALS
DIASTOLIC BLOOD PRESSURE: 70 MMHG | WEIGHT: 287 LBS | SYSTOLIC BLOOD PRESSURE: 132 MMHG | HEIGHT: 63 IN | BODY MASS INDEX: 50.85 KG/M2

## 2024-09-23 DIAGNOSIS — Z97.5 INTERMENSTRUAL SPOTTING DUE TO IUD: ICD-10-CM

## 2024-09-23 DIAGNOSIS — N92.0 INTERMENSTRUAL SPOTTING DUE TO IUD: ICD-10-CM

## 2024-09-23 DIAGNOSIS — Z30.431 IUD CHECK UP: Primary | ICD-10-CM

## 2024-09-23 DIAGNOSIS — N93.9 ABNORMAL UTERINE BLEEDING (AUB): ICD-10-CM

## 2024-09-29 RX ORDER — DESOGESTREL AND ETHINYL ESTRADIOL 0.15-0.03
1 KIT ORAL DAILY
Qty: 1 PACKET | Refills: 3 | Status: SHIPPED | OUTPATIENT
Start: 2024-09-29

## 2024-10-20 DIAGNOSIS — I15.8 OTHER SECONDARY HYPERTENSION: ICD-10-CM

## 2024-10-21 RX ORDER — VALSARTAN 80 MG/1
80 TABLET ORAL DAILY
Qty: 90 TABLET | Refills: 0 | Status: SHIPPED | OUTPATIENT
Start: 2024-10-21

## 2024-10-21 NOTE — TELEPHONE ENCOUNTER
Requested Prescriptions     Pending Prescriptions Disp Refills    valsartan (DIOVAN) 80 MG tablet [Pharmacy Med Name: VALSARTAN 80 MG TABLET] 90 tablet 0     Sig: TAKE 1 TABLET BY MOUTH EVERY DAY      Next ov 12/16/2024. Pharmacy confirmed.

## 2024-11-18 DIAGNOSIS — I15.8 OTHER SECONDARY HYPERTENSION: ICD-10-CM

## 2024-11-18 RX ORDER — SPIRONOLACTONE 50 MG/1
50 TABLET, FILM COATED ORAL DAILY
Qty: 90 TABLET | Refills: 1 | Status: SHIPPED | OUTPATIENT
Start: 2024-11-18

## 2024-12-16 ENCOUNTER — OFFICE VISIT (OUTPATIENT)
Dept: INTERNAL MEDICINE CLINIC | Facility: CLINIC | Age: 39
End: 2024-12-16
Payer: COMMERCIAL

## 2024-12-16 VITALS
OXYGEN SATURATION: 96 % | DIASTOLIC BLOOD PRESSURE: 82 MMHG | WEIGHT: 287.5 LBS | BODY MASS INDEX: 50.94 KG/M2 | SYSTOLIC BLOOD PRESSURE: 150 MMHG | HEART RATE: 85 BPM | HEIGHT: 63 IN

## 2024-12-16 DIAGNOSIS — R20.2 NUMBNESS AND TINGLING IN BOTH HANDS: ICD-10-CM

## 2024-12-16 DIAGNOSIS — Z87.09 HISTORY OF ASTHMA: ICD-10-CM

## 2024-12-16 DIAGNOSIS — E66.01 CLASS 3 SEVERE OBESITY DUE TO EXCESS CALORIES WITHOUT SERIOUS COMORBIDITY WITH BODY MASS INDEX (BMI) OF 45.0 TO 49.9 IN ADULT: ICD-10-CM

## 2024-12-16 DIAGNOSIS — R20.0 NUMBNESS AND TINGLING IN BOTH HANDS: ICD-10-CM

## 2024-12-16 DIAGNOSIS — I10 PRIMARY HYPERTENSION: ICD-10-CM

## 2024-12-16 DIAGNOSIS — R00.2 PALPITATION: ICD-10-CM

## 2024-12-16 DIAGNOSIS — R73.03 PREDIABETES: Primary | ICD-10-CM

## 2024-12-16 DIAGNOSIS — E66.813 CLASS 3 SEVERE OBESITY DUE TO EXCESS CALORIES WITHOUT SERIOUS COMORBIDITY WITH BODY MASS INDEX (BMI) OF 45.0 TO 49.9 IN ADULT: ICD-10-CM

## 2024-12-16 DIAGNOSIS — F32.A ANXIETY AND DEPRESSION: ICD-10-CM

## 2024-12-16 DIAGNOSIS — F41.9 ANXIETY AND DEPRESSION: ICD-10-CM

## 2024-12-16 DIAGNOSIS — J40 BRONCHITIS: Primary | ICD-10-CM

## 2024-12-16 LAB
ALBUMIN SERPL-MCNC: 4 G/DL (ref 3.5–5)
ALBUMIN/GLOB SERPL: 1.2 (ref 1–1.9)
ALP SERPL-CCNC: 42 U/L (ref 35–104)
ALT SERPL-CCNC: 18 U/L (ref 8–45)
ANION GAP SERPL CALC-SCNC: 10 MMOL/L (ref 7–16)
AST SERPL-CCNC: 16 U/L (ref 15–37)
BASOPHILS # BLD: 0 K/UL (ref 0–0.2)
BASOPHILS NFR BLD: 0 % (ref 0–2)
BILIRUB SERPL-MCNC: <0.2 MG/DL (ref 0–1.2)
BUN SERPL-MCNC: 13 MG/DL (ref 6–23)
CALCIUM SERPL-MCNC: 9.4 MG/DL (ref 8.8–10.2)
CHLORIDE SERPL-SCNC: 103 MMOL/L (ref 98–107)
CO2 SERPL-SCNC: 25 MMOL/L (ref 20–29)
CREAT SERPL-MCNC: 0.62 MG/DL (ref 0.6–1.1)
DIFFERENTIAL METHOD BLD: ABNORMAL
EOSINOPHIL # BLD: 0.1 K/UL (ref 0–0.8)
EOSINOPHIL NFR BLD: 1 % (ref 0.5–7.8)
ERYTHROCYTE [DISTWIDTH] IN BLOOD BY AUTOMATED COUNT: 13.2 % (ref 11.9–14.6)
EST. AVERAGE GLUCOSE BLD GHB EST-MCNC: 127 MG/DL
GLOBULIN SER CALC-MCNC: 3.4 G/DL (ref 2.3–3.5)
GLUCOSE SERPL-MCNC: 96 MG/DL (ref 70–99)
HBA1C MFR BLD: 6.1 % (ref 0–5.6)
HCT VFR BLD AUTO: 40.5 % (ref 35.8–46.3)
HGB BLD-MCNC: 12.8 G/DL (ref 11.7–15.4)
IMM GRANULOCYTES # BLD AUTO: 0 K/UL (ref 0–0.5)
IMM GRANULOCYTES NFR BLD AUTO: 0 % (ref 0–5)
LYMPHOCYTES # BLD: 2.9 K/UL (ref 0.5–4.6)
LYMPHOCYTES NFR BLD: 31 % (ref 13–44)
MAGNESIUM SERPL-MCNC: 2.1 MG/DL (ref 1.8–2.4)
MCH RBC QN AUTO: 26.4 PG (ref 26.1–32.9)
MCHC RBC AUTO-ENTMCNC: 31.6 G/DL (ref 31.4–35)
MCV RBC AUTO: 83.5 FL (ref 82–102)
MONOCYTES # BLD: 0.5 K/UL (ref 0.1–1.3)
MONOCYTES NFR BLD: 6 % (ref 4–12)
NEUTS SEG # BLD: 5.6 K/UL (ref 1.7–8.2)
NEUTS SEG NFR BLD: 62 % (ref 43–78)
NRBC # BLD: 0 K/UL (ref 0–0.2)
PLATELET # BLD AUTO: 324 K/UL (ref 150–450)
PMV BLD AUTO: 9.1 FL (ref 9.4–12.3)
POTASSIUM SERPL-SCNC: 4 MMOL/L (ref 3.5–5.1)
PROT SERPL-MCNC: 7.3 G/DL (ref 6.3–8.2)
RBC # BLD AUTO: 4.85 M/UL (ref 4.05–5.2)
SODIUM SERPL-SCNC: 138 MMOL/L (ref 136–145)
TSH W FREE THYROID IF ABNORMAL: 1.03 UIU/ML (ref 0.27–4.2)
WBC # BLD AUTO: 9.1 K/UL (ref 4.3–11.1)

## 2024-12-16 PROCEDURE — 3079F DIAST BP 80-89 MM HG: CPT | Performed by: INTERNAL MEDICINE

## 2024-12-16 PROCEDURE — 3077F SYST BP >= 140 MM HG: CPT | Performed by: INTERNAL MEDICINE

## 2024-12-16 PROCEDURE — 99214 OFFICE O/P EST MOD 30 MIN: CPT | Performed by: INTERNAL MEDICINE

## 2024-12-16 RX ORDER — SPIRONOLACTONE 50 MG/1
50 TABLET, FILM COATED ORAL DAILY
Qty: 90 TABLET | Refills: 1 | Status: SHIPPED | OUTPATIENT
Start: 2024-12-16

## 2024-12-16 RX ORDER — PREDNISONE 20 MG/1
20 TABLET ORAL DAILY
Qty: 7 TABLET | Refills: 0 | Status: SHIPPED | OUTPATIENT
Start: 2024-12-16 | End: 2024-12-23

## 2024-12-16 RX ORDER — ALBUTEROL SULFATE 90 UG/1
2 INHALANT RESPIRATORY (INHALATION) EVERY 4 HOURS PRN
Qty: 18 G | Refills: 1 | Status: SHIPPED | OUTPATIENT
Start: 2024-12-16

## 2024-12-16 RX ORDER — VALSARTAN 80 MG/1
80 TABLET ORAL DAILY
Qty: 90 TABLET | Refills: 1 | Status: SHIPPED | OUTPATIENT
Start: 2024-12-16

## 2024-12-16 RX ORDER — MELOXICAM 15 MG/1
15 TABLET ORAL DAILY PRN
Qty: 30 TABLET | Refills: 1 | Status: CANCELLED | OUTPATIENT
Start: 2024-12-16

## 2024-12-16 ASSESSMENT — ENCOUNTER SYMPTOMS
CONSTIPATION: 0
CHEST TIGHTNESS: 0
COUGH: 1
ABDOMINAL DISTENTION: 0
ABDOMINAL PAIN: 0
SHORTNESS OF BREATH: 1

## 2024-12-16 ASSESSMENT — PATIENT HEALTH QUESTIONNAIRE - PHQ9
SUM OF ALL RESPONSES TO PHQ QUESTIONS 1-9: 2
SUM OF ALL RESPONSES TO PHQ9 QUESTIONS 1 & 2: 2
1. LITTLE INTEREST OR PLEASURE IN DOING THINGS: SEVERAL DAYS
2. FEELING DOWN, DEPRESSED OR HOPELESS: SEVERAL DAYS
2. FEELING DOWN, DEPRESSED OR HOPELESS: SEVERAL DAYS
SUM OF ALL RESPONSES TO PHQ9 QUESTIONS 1 & 2: 2
SUM OF ALL RESPONSES TO PHQ QUESTIONS 1-9: 2
1. LITTLE INTEREST OR PLEASURE IN DOING THINGS: SEVERAL DAYS
SUM OF ALL RESPONSES TO PHQ QUESTIONS 1-9: 2
SUM OF ALL RESPONSES TO PHQ QUESTIONS 1-9: 2

## 2024-12-16 NOTE — PROGRESS NOTES
37wks w/ PreE w/OUT severe features; developed several severe bps on L&D and recurrent severe bps pp; no Mag in labor--will hold for now.  Start Procardia 30 XL BID.       Sleep apnea     ?--with 2014 surg at [facility]--- per pt never has been tested        Past Surgical History:   Procedure Laterality Date    CHOLECYSTECTOMY  11/2015    Dr FRIEND    MYOMECTOMY  2014    TONSILLECTOMY AND ADENOIDECTOMY  1995    WISDOM TOOTH EXTRACTION          Physical exam:    BP (!) 166/90 (Site: Left Upper Arm, Position: Sitting)   Pulse 85   Ht 1.6 m (5' 3\")   Wt 130.4 kg (287 lb 8 oz)   SpO2 96%   BMI 50.93 kg/m²     Physical Exam  Vitals reviewed.   Constitutional:       Appearance: Normal appearance. She is obese.   HENT:      Head: Normocephalic and atraumatic.   Cardiovascular:      Rate and Rhythm: Normal rate and regular rhythm.   Pulmonary:      Effort: Pulmonary effort is normal.      Breath sounds: Normal breath sounds.   Neurological:      General: No focal deficit present.      Mental Status: She is alert and oriented to person, place, and time.   Psychiatric:         Mood and Affect: Mood normal.         Behavior: Behavior normal.          Recent labs:    Hemoglobin A1C   Date Value Ref Range Status   01/27/2023 5.6 4.8 - 5.6 % Final        Lab Results   Component Value Date    CHOL 187 01/27/2023     Lab Results   Component Value Date    TRIG 106 01/27/2023     Lab Results   Component Value Date    HDL 56 01/27/2023     No components found for: \"LDLCHOLESTEROL\", \"LDLCALC\"  Lab Results   Component Value Date    VLDL 21.2 01/27/2023     Lab Results   Component Value Date    CHOLHDLRATIO 3.3 01/27/2023     Lab Results   Component Value Date    TSH 0.108 (L) 10/05/2021    TSHELE 0.75 01/27/2023         Lab Results   Component Value Date     11/28/2023    K 4.0 11/28/2023     11/28/2023    CO2 24 11/28/2023    BUN 12 11/28/2023    CREATININE 0.86 11/28/2023    GLUCOSE 188 (H) 11/28/2023    CALCIUM 8.8

## 2025-01-14 ENCOUNTER — OFFICE VISIT (OUTPATIENT)
Age: 40
End: 2025-01-14
Payer: COMMERCIAL

## 2025-01-14 VITALS — WEIGHT: 270 LBS | HEIGHT: 63 IN | BODY MASS INDEX: 47.84 KG/M2

## 2025-01-14 DIAGNOSIS — G56.03 CARPAL TUNNEL SYNDROME, BILATERAL: Primary | ICD-10-CM

## 2025-01-14 PROCEDURE — 99203 OFFICE O/P NEW LOW 30 MIN: CPT | Performed by: ORTHOPAEDIC SURGERY

## 2025-01-14 RX ORDER — UBIDECARENONE 75 MG
50 CAPSULE ORAL DAILY
COMMUNITY

## 2025-01-14 RX ORDER — MULTIVITAMIN WITH IRON
100 TABLET ORAL DAILY
COMMUNITY

## 2025-01-14 NOTE — PROGRESS NOTES
Orthopaedic Hand Surgery Note    Name: Marilu Stanton  YOB: 1985  Gender: female  MRN: [id number]    CC: hand numbness      HPI: Patient is a 39 y.o. female with a chief complaint of bilateral hand numbness and tingling in the median nerve distribution. The symptoms have been going on for years. The patient does complain of night wakening and increased symptoms with driving and most daily activities. Evaluation to date has included none. Treatment to date has included braces.     ROS/Meds/PSH/PMH/FH/SH: I personally reviewed the patients standard intake form.  Pertinents are discussed In the HPI    Physical Examination:    Musculoskeletal:       Examination of the bilateral upper extremity demonstrates Normal sensation to light touch in the median distribution, normal sensation in ulnar and radial distribution, Positive carpal tunnel compression testing and Phalen testing, cap refill < 5 seconds in all fingers. Inspection reveals no thenar atrophy. Negative Tinel and elbow flexion compression test of the cubital tunnel, negative Tinel over Guyon's canal. Sensation to light touch in the ulnar 2 digits is normal with no intrinsic atrophy/weakness. No tenderness to palpation or masses noted in the forearm.    Imaging / Electrodiagnostic Tests:     none    Assessment:     ICD-10-CM    1. Carpal tunnel syndrome, bilateral  G56.03           Plan:  We discussed the diagnosis and different treatment options. We discussed observation, EMG/NCV, night splinting, cortisone injections and surgical decompression and the risks and benefits of all were clearly outlined. We discussed that carpal tunnel syndrome is a progressive condition, and that many people do require surgical decompression to alleviate symptoms and prevent permanent sensory dysfunction, motor weakness and muscle atrophy After discussing in detail the patient elects to proceed with use of braces at nighttime, will refer her for nerve conduction

## 2025-01-24 ASSESSMENT — PATIENT HEALTH QUESTIONNAIRE - PHQ9
9. THOUGHTS THAT YOU WOULD BE BETTER OFF DEAD, OR OF HURTING YOURSELF: NOT AT ALL
2. FEELING DOWN, DEPRESSED OR HOPELESS: SEVERAL DAYS
3. TROUBLE FALLING OR STAYING ASLEEP: NEARLY EVERY DAY
10. IF YOU CHECKED OFF ANY PROBLEMS, HOW DIFFICULT HAVE THESE PROBLEMS MADE IT FOR YOU TO DO YOUR WORK, TAKE CARE OF THINGS AT HOME, OR GET ALONG WITH OTHER PEOPLE: VERY DIFFICULT
7. TROUBLE CONCENTRATING ON THINGS, SUCH AS READING THE NEWSPAPER OR WATCHING TELEVISION: SEVERAL DAYS
1. LITTLE INTEREST OR PLEASURE IN DOING THINGS: SEVERAL DAYS
5. POOR APPETITE OR OVEREATING: NEARLY EVERY DAY
10. IF YOU CHECKED OFF ANY PROBLEMS, HOW DIFFICULT HAVE THESE PROBLEMS MADE IT FOR YOU TO DO YOUR WORK, TAKE CARE OF THINGS AT HOME, OR GET ALONG WITH OTHER PEOPLE: VERY DIFFICULT
6. FEELING BAD ABOUT YOURSELF - OR THAT YOU ARE A FAILURE OR HAVE LET YOURSELF OR YOUR FAMILY DOWN: SEVERAL DAYS
SUM OF ALL RESPONSES TO PHQ QUESTIONS 1-9: 13
SUM OF ALL RESPONSES TO PHQ QUESTIONS 1-9: 13
4. FEELING TIRED OR HAVING LITTLE ENERGY: NEARLY EVERY DAY
9. THOUGHTS THAT YOU WOULD BE BETTER OFF DEAD, OR OF HURTING YOURSELF: NOT AT ALL
8. MOVING OR SPEAKING SO SLOWLY THAT OTHER PEOPLE COULD HAVE NOTICED. OR THE OPPOSITE, BEING SO FIGETY OR RESTLESS THAT YOU HAVE BEEN MOVING AROUND A LOT MORE THAN USUAL: NOT AT ALL
SUM OF ALL RESPONSES TO PHQ9 QUESTIONS 1 & 2: 2
3. TROUBLE FALLING OR STAYING ASLEEP: NEARLY EVERY DAY
SUM OF ALL RESPONSES TO PHQ QUESTIONS 1-9: 13
5. POOR APPETITE OR OVEREATING: NEARLY EVERY DAY
7. TROUBLE CONCENTRATING ON THINGS, SUCH AS READING THE NEWSPAPER OR WATCHING TELEVISION: SEVERAL DAYS
1. LITTLE INTEREST OR PLEASURE IN DOING THINGS: SEVERAL DAYS
8. MOVING OR SPEAKING SO SLOWLY THAT OTHER PEOPLE COULD HAVE NOTICED. OR THE OPPOSITE - BEING SO FIDGETY OR RESTLESS THAT YOU HAVE BEEN MOVING AROUND A LOT MORE THAN USUAL: NOT AT ALL
2. FEELING DOWN, DEPRESSED OR HOPELESS: SEVERAL DAYS
SUM OF ALL RESPONSES TO PHQ QUESTIONS 1-9: 13
SUM OF ALL RESPONSES TO PHQ QUESTIONS 1-9: 13
6. FEELING BAD ABOUT YOURSELF - OR THAT YOU ARE A FAILURE OR HAVE LET YOURSELF OR YOUR FAMILY DOWN: SEVERAL DAYS
4. FEELING TIRED OR HAVING LITTLE ENERGY: NEARLY EVERY DAY

## 2025-01-24 ASSESSMENT — ANXIETY QUESTIONNAIRES
4. TROUBLE RELAXING: MORE THAN HALF THE DAYS
1. FEELING NERVOUS, ANXIOUS, OR ON EDGE: MORE THAN HALF THE DAYS
6. BECOMING EASILY ANNOYED OR IRRITABLE: NEARLY EVERY DAY
IF YOU CHECKED OFF ANY PROBLEMS ON THIS QUESTIONNAIRE, HOW DIFFICULT HAVE THESE PROBLEMS MADE IT FOR YOU TO DO YOUR WORK, TAKE CARE OF THINGS AT HOME, OR GET ALONG WITH OTHER PEOPLE: VERY DIFFICULT
3. WORRYING TOO MUCH ABOUT DIFFERENT THINGS: MORE THAN HALF THE DAYS
3. WORRYING TOO MUCH ABOUT DIFFERENT THINGS: MORE THAN HALF THE DAYS
2. NOT BEING ABLE TO STOP OR CONTROL WORRYING: SEVERAL DAYS
1. FEELING NERVOUS, ANXIOUS, OR ON EDGE: MORE THAN HALF THE DAYS
5. BEING SO RESTLESS THAT IT IS HARD TO SIT STILL: SEVERAL DAYS
IF YOU CHECKED OFF ANY PROBLEMS ON THIS QUESTIONNAIRE, HOW DIFFICULT HAVE THESE PROBLEMS MADE IT FOR YOU TO DO YOUR WORK, TAKE CARE OF THINGS AT HOME, OR GET ALONG WITH OTHER PEOPLE: VERY DIFFICULT
7. FEELING AFRAID AS IF SOMETHING AWFUL MIGHT HAPPEN: SEVERAL DAYS
4. TROUBLE RELAXING: MORE THAN HALF THE DAYS
2. NOT BEING ABLE TO STOP OR CONTROL WORRYING: SEVERAL DAYS
5. BEING SO RESTLESS THAT IT IS HARD TO SIT STILL: SEVERAL DAYS
6. BECOMING EASILY ANNOYED OR IRRITABLE: NEARLY EVERY DAY
7. FEELING AFRAID AS IF SOMETHING AWFUL MIGHT HAPPEN: SEVERAL DAYS
GAD7 TOTAL SCORE: 12

## 2025-01-27 ENCOUNTER — OFFICE VISIT (OUTPATIENT)
Dept: BEHAVIORAL/MENTAL HEALTH CLINIC | Facility: CLINIC | Age: 40
End: 2025-01-27
Payer: COMMERCIAL

## 2025-01-27 VITALS
WEIGHT: 293 LBS | DIASTOLIC BLOOD PRESSURE: 76 MMHG | OXYGEN SATURATION: 97 % | SYSTOLIC BLOOD PRESSURE: 124 MMHG | HEART RATE: 89 BPM | HEIGHT: 63 IN | BODY MASS INDEX: 51.91 KG/M2

## 2025-01-27 DIAGNOSIS — G47.00 INSOMNIA, UNSPECIFIED TYPE: ICD-10-CM

## 2025-01-27 DIAGNOSIS — F41.9 ANXIETY DISORDER, UNSPECIFIED TYPE: ICD-10-CM

## 2025-01-27 DIAGNOSIS — F33.1 MAJOR DEPRESSIVE DISORDER, RECURRENT, MODERATE (HCC): Primary | ICD-10-CM

## 2025-01-27 PROCEDURE — 90792 PSYCH DIAG EVAL W/MED SRVCS: CPT | Performed by: PSYCHIATRY & NEUROLOGY

## 2025-01-27 RX ORDER — TRAZODONE HYDROCHLORIDE 50 MG/1
25-50 TABLET, FILM COATED ORAL NIGHTLY
Qty: 30 TABLET | Refills: 1 | Status: SHIPPED | OUTPATIENT
Start: 2025-01-27

## 2025-01-27 RX ORDER — PAROXETINE 10 MG/1
10 TABLET, FILM COATED ORAL DAILY
Qty: 30 TABLET | Refills: 1 | Status: SHIPPED | OUTPATIENT
Start: 2025-01-27
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
## 2025-01-28 ENCOUNTER — PROCEDURE VISIT (OUTPATIENT)
Age: 40
End: 2025-01-28
Payer: COMMERCIAL

## 2025-01-28 DIAGNOSIS — G56.03 BILATERAL CARPAL TUNNEL SYNDROME: Primary | ICD-10-CM

## 2025-01-28 PROCEDURE — 95911 NRV CNDJ TEST 9-10 STUDIES: CPT | Performed by: PHYSICAL MEDICINE & REHABILITATION

## 2025-02-04 ENCOUNTER — OFFICE VISIT (OUTPATIENT)
Age: 40
End: 2025-02-04
Payer: COMMERCIAL

## 2025-02-04 DIAGNOSIS — G56.03 CARPAL TUNNEL SYNDROME, BILATERAL: Primary | ICD-10-CM

## 2025-02-04 PROCEDURE — 99214 OFFICE O/P EST MOD 30 MIN: CPT | Performed by: ORTHOPAEDIC SURGERY

## 2025-02-04 NOTE — PROGRESS NOTES
Orthopaedic Hand Surgery Note    Name: Marilu Stanton  YOB: 1985  Gender: female  MRN: [id number]    CC: Follow up of hand numbness    HPI: Patient is a 39 y.o. female who is here regarding follow up for  hand numbness and tingling.  She is here to review her nerve conduction study.    ROS/Meds/PSH/PMH/FH/SH: I personally reviewed the patients standard intake form.  Pertinents are discussed in the HPI    Physical Examination:  Musculoskeletal:   Examination of the bilateral upper extremity demonstrates Normal sensation to light touch in the median distribution, normal sensation in ulnar and radial distribution, Positive carpal tunnel compression testing and Phalen testing, cap refill < 5 seconds in all fingers. Inspection reveals no thenar atrophy. Negative Tinel and elbow flexion compression test of the cubital tunnel, negative Tinel over Guyon's canal. Sensation to light touch in the ulnar 2 digits is normal with no intrinsic atrophy/weakness. No tenderness to palpation or masses noted in the forearm.       Imaging / Electrodiagnostic Tests:     I independently reviewed and interpreted the patient's nerve conduction study.  She has findings consistent with moderate to severe bilateral carpal tunnel syndrome    Assessment:     ICD-10-CM    1. Carpal tunnel syndrome, bilateral  G56.03           Plan:  We discussed the diagnosis and different treatment options. We discussed observation, EMG/NCV, night splinting, cortisone injections and surgical decompression and the risks and benefits of all were clearly outlined. We discussed that carpal tunnel syndrome is a progressive condition, which at its most severe stages can cause permanent sensory dysfunction, motor weakness and muscle atrophy. Many people do require surgical decompression to alleviate symptoms and prevent permanent deficits from occurring.. After discussing in detail the patient elects to proceed with continued conservative management

## 2025-02-17 NOTE — PROGRESS NOTES
[facility]  [address]  [place] [address]  [phone]    Patient Name:  Marilu Stanton  YOB: 1985      Office Visit 2/18/2025    CHIEF COMPLAINT:    Chief Complaint   Patient presents with    Sleep Apnea    Follow-up         HISTORY OF PRESENT ILLNESS:  Patient is an 39 y.o. female seen today for follow up of JAQUELINE. Pt had a PSG/HST on 1/15/24 with an AHI of 13.7/hr with desaturations to 78%. Pt is on CPAP 9-12 cm H2O.   Pt is accompanied by her daughter. Pt reports that she is doing well. She is using a full face mask but [facility] keeps sending her nasal mask supplies. I will place an order for FF masks.   She reports that she is sleeping well overall. Her psychiatrist put her on paxil and trazodone. She finds that she is waking up less frequently.   She has noticed that her energy is better. She is also not as groggy in the mornings. She has excellent compliance with use 206/206 days with an average use of 6 hrs and 38 mins per day. Pt has a mask leak of 0.1L/min with an AHI of 0.6/hr. Pt is benefiting and tolerating PAP therapy.           2/18/2025     9:06 AM 7/26/2024    11:14 AM 3/11/2024    11:59 AM   Sleep Medicine   Sitting and reading 1 3 3   Watching TV 1 2 3   Sitting, inactive in a public place (e.g. a theatre or a meeting) 0 0 1   As a passenger in a car for an hour without a break 0 0 1   Lying down to rest in the afternoon when circumstances permit 2 3 3   Sitting and talking to someone 0 0 0   Sitting quietly after a lunch without alcohol 0 1 1   In a car, while stopped for a few minutes in traffic 0 0 0   Hardesty Sleepiness Score 4 9 12         Past Medical History:   Diagnosis Date    Asthma     prn inhaler    Cholelithiasis with cholecystitis 11/24/2015    GERD (gastroesophageal reflux disease)     controlled with medication    Gestational diabetes 2017    Gestational hypertension     History of miscarriage 7/13/2014    Infertility, female     Insulin

## 2025-02-18 ENCOUNTER — OFFICE VISIT (OUTPATIENT)
Dept: SLEEP MEDICINE | Age: 40
End: 2025-02-18
Payer: COMMERCIAL

## 2025-02-18 VITALS
RESPIRATION RATE: 14 BRPM | BODY MASS INDEX: 51.21 KG/M2 | HEIGHT: 63 IN | SYSTOLIC BLOOD PRESSURE: 145 MMHG | OXYGEN SATURATION: 95 % | WEIGHT: 289 LBS | HEART RATE: 80 BPM | DIASTOLIC BLOOD PRESSURE: 86 MMHG

## 2025-02-18 DIAGNOSIS — G47.33 OSA (OBSTRUCTIVE SLEEP APNEA): Primary | ICD-10-CM

## 2025-02-18 DIAGNOSIS — E66.01 MORBID (SEVERE) OBESITY DUE TO EXCESS CALORIES: ICD-10-CM

## 2025-02-18 DIAGNOSIS — G47.34 NOCTURNAL HYPOXEMIA: ICD-10-CM

## 2025-02-18 PROCEDURE — 99214 OFFICE O/P EST MOD 30 MIN: CPT | Performed by: PHYSICIAN ASSISTANT

## 2025-02-18 ASSESSMENT — SLEEP AND FATIGUE QUESTIONNAIRES
HOW LIKELY ARE YOU TO NOD OFF OR FALL ASLEEP WHILE SITTING QUIETLY AFTER LUNCH WITHOUT ALCOHOL: WOULD NEVER DOZE
HOW LIKELY ARE YOU TO NOD OFF OR FALL ASLEEP WHEN YOU ARE A PASSENGER IN A CAR FOR AN HOUR WITHOUT A BREAK: WOULD NEVER DOZE
HOW LIKELY ARE YOU TO NOD OFF OR FALL ASLEEP IN A CAR, WHILE STOPPED FOR A FEW MINUTES IN TRAFFIC: WOULD NEVER DOZE
HOW LIKELY ARE YOU TO NOD OFF OR FALL ASLEEP WHILE SITTING INACTIVE IN A PUBLIC PLACE: WOULD NEVER DOZE
HOW LIKELY ARE YOU TO NOD OFF OR FALL ASLEEP WHILE SITTING AND TALKING TO SOMEONE: WOULD NEVER DOZE
ESS TOTAL SCORE: 4
HOW LIKELY ARE YOU TO NOD OFF OR FALL ASLEEP WHILE WATCHING TV: SLIGHT CHANCE OF DOZING
HOW LIKELY ARE YOU TO NOD OFF OR FALL ASLEEP WHILE SITTING AND READING: SLIGHT CHANCE OF DOZING
HOW LIKELY ARE YOU TO NOD OFF OR FALL ASLEEP WHILE LYING DOWN TO REST IN THE AFTERNOON WHEN CIRCUMSTANCES PERMIT: MODERATE CHANCE OF DOZING

## 2025-03-03 ASSESSMENT — PATIENT HEALTH QUESTIONNAIRE - PHQ9
10. IF YOU CHECKED OFF ANY PROBLEMS, HOW DIFFICULT HAVE THESE PROBLEMS MADE IT FOR YOU TO DO YOUR WORK, TAKE CARE OF THINGS AT HOME, OR GET ALONG WITH OTHER PEOPLE: SOMEWHAT DIFFICULT
9. THOUGHTS THAT YOU WOULD BE BETTER OFF DEAD, OR OF HURTING YOURSELF: NOT AT ALL
7. TROUBLE CONCENTRATING ON THINGS, SUCH AS READING THE NEWSPAPER OR WATCHING TELEVISION: SEVERAL DAYS
3. TROUBLE FALLING OR STAYING ASLEEP: MORE THAN HALF THE DAYS
4. FEELING TIRED OR HAVING LITTLE ENERGY: NEARLY EVERY DAY
6. FEELING BAD ABOUT YOURSELF - OR THAT YOU ARE A FAILURE OR HAVE LET YOURSELF OR YOUR FAMILY DOWN: SEVERAL DAYS
1. LITTLE INTEREST OR PLEASURE IN DOING THINGS: SEVERAL DAYS
8. MOVING OR SPEAKING SO SLOWLY THAT OTHER PEOPLE COULD HAVE NOTICED. OR THE OPPOSITE - BEING SO FIDGETY OR RESTLESS THAT YOU HAVE BEEN MOVING AROUND A LOT MORE THAN USUAL: NOT AT ALL
SUM OF ALL RESPONSES TO PHQ QUESTIONS 1-9: 9
4. FEELING TIRED OR HAVING LITTLE ENERGY: NEARLY EVERY DAY
SUM OF ALL RESPONSES TO PHQ QUESTIONS 1-9: 9
5. POOR APPETITE OR OVEREATING: SEVERAL DAYS
2. FEELING DOWN, DEPRESSED OR HOPELESS: NOT AT ALL
SUM OF ALL RESPONSES TO PHQ QUESTIONS 1-9: 9
SUM OF ALL RESPONSES TO PHQ QUESTIONS 1-9: 9
6. FEELING BAD ABOUT YOURSELF - OR THAT YOU ARE A FAILURE OR HAVE LET YOURSELF OR YOUR FAMILY DOWN: SEVERAL DAYS
3. TROUBLE FALLING OR STAYING ASLEEP: MORE THAN HALF THE DAYS
5. POOR APPETITE OR OVEREATING: SEVERAL DAYS
7. TROUBLE CONCENTRATING ON THINGS, SUCH AS READING THE NEWSPAPER OR WATCHING TELEVISION: SEVERAL DAYS
SUM OF ALL RESPONSES TO PHQ QUESTIONS 1-9: 9
8. MOVING OR SPEAKING SO SLOWLY THAT OTHER PEOPLE COULD HAVE NOTICED. OR THE OPPOSITE, BEING SO FIGETY OR RESTLESS THAT YOU HAVE BEEN MOVING AROUND A LOT MORE THAN USUAL: NOT AT ALL
2. FEELING DOWN, DEPRESSED OR HOPELESS: NOT AT ALL
1. LITTLE INTEREST OR PLEASURE IN DOING THINGS: SEVERAL DAYS
9. THOUGHTS THAT YOU WOULD BE BETTER OFF DEAD, OR OF HURTING YOURSELF: NOT AT ALL
10. IF YOU CHECKED OFF ANY PROBLEMS, HOW DIFFICULT HAVE THESE PROBLEMS MADE IT FOR YOU TO DO YOUR WORK, TAKE CARE OF THINGS AT HOME, OR GET ALONG WITH OTHER PEOPLE: SOMEWHAT DIFFICULT

## 2025-03-03 ASSESSMENT — ANXIETY QUESTIONNAIRES
2. NOT BEING ABLE TO STOP OR CONTROL WORRYING: MORE THAN HALF THE DAYS
1. FEELING NERVOUS, ANXIOUS, OR ON EDGE: MORE THAN HALF THE DAYS
IF YOU CHECKED OFF ANY PROBLEMS ON THIS QUESTIONNAIRE, HOW DIFFICULT HAVE THESE PROBLEMS MADE IT FOR YOU TO DO YOUR WORK, TAKE CARE OF THINGS AT HOME, OR GET ALONG WITH OTHER PEOPLE: SOMEWHAT DIFFICULT
5. BEING SO RESTLESS THAT IT IS HARD TO SIT STILL: SEVERAL DAYS
6. BECOMING EASILY ANNOYED OR IRRITABLE: NEARLY EVERY DAY
7. FEELING AFRAID AS IF SOMETHING AWFUL MIGHT HAPPEN: SEVERAL DAYS
IF YOU CHECKED OFF ANY PROBLEMS ON THIS QUESTIONNAIRE, HOW DIFFICULT HAVE THESE PROBLEMS MADE IT FOR YOU TO DO YOUR WORK, TAKE CARE OF THINGS AT HOME, OR GET ALONG WITH OTHER PEOPLE: SOMEWHAT DIFFICULT
4. TROUBLE RELAXING: NEARLY EVERY DAY
3. WORRYING TOO MUCH ABOUT DIFFERENT THINGS: MORE THAN HALF THE DAYS
1. FEELING NERVOUS, ANXIOUS, OR ON EDGE: MORE THAN HALF THE DAYS
7. FEELING AFRAID AS IF SOMETHING AWFUL MIGHT HAPPEN: SEVERAL DAYS
6. BECOMING EASILY ANNOYED OR IRRITABLE: NEARLY EVERY DAY
2. NOT BEING ABLE TO STOP OR CONTROL WORRYING: MORE THAN HALF THE DAYS
4. TROUBLE RELAXING: NEARLY EVERY DAY
GAD7 TOTAL SCORE: 14
3. WORRYING TOO MUCH ABOUT DIFFERENT THINGS: MORE THAN HALF THE DAYS
5. BEING SO RESTLESS THAT IT IS HARD TO SIT STILL: SEVERAL DAYS

## 2025-03-04 ENCOUNTER — OFFICE VISIT (OUTPATIENT)
Dept: BEHAVIORAL/MENTAL HEALTH CLINIC | Facility: CLINIC | Age: 40
End: 2025-03-04

## 2025-03-04 VITALS — BODY MASS INDEX: 51.1 KG/M2 | HEIGHT: 63 IN | WEIGHT: 288.4 LBS

## 2025-03-04 DIAGNOSIS — F41.9 ANXIETY DISORDER, UNSPECIFIED TYPE: ICD-10-CM

## 2025-03-04 DIAGNOSIS — G47.00 INSOMNIA, UNSPECIFIED TYPE: ICD-10-CM

## 2025-03-04 DIAGNOSIS — F33.1 MAJOR DEPRESSIVE DISORDER, RECURRENT, MODERATE (HCC): ICD-10-CM

## 2025-03-04 RX ORDER — PAROXETINE 20 MG/1
20 TABLET, FILM COATED ORAL DAILY
Qty: 30 TABLET | Refills: 1 | Status: SHIPPED | OUTPATIENT
Start: 2025-03-04

## 2025-03-04 RX ORDER — ARIPIPRAZOLE 5 MG/1
5 TABLET ORAL DAILY
Qty: 30 TABLET | Refills: 1 | Status: SHIPPED | OUTPATIENT
Start: 2025-03-04

## 2025-03-04 RX ORDER — TRAZODONE HYDROCHLORIDE 50 MG/1
25-50 TABLET ORAL NIGHTLY
Qty: 30 TABLET | Refills: 1 | Status: SHIPPED | OUTPATIENT
Start: 2025-03-04
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
## 2025-04-08 ENCOUNTER — PATIENT MESSAGE (OUTPATIENT)
Dept: INTERNAL MEDICINE CLINIC | Facility: CLINIC | Age: 40
End: 2025-04-08

## 2025-04-11 ENCOUNTER — OFFICE VISIT (OUTPATIENT)
Dept: INTERNAL MEDICINE CLINIC | Facility: CLINIC | Age: 40
End: 2025-04-11
Payer: COMMERCIAL

## 2025-04-11 VITALS
BODY MASS INDEX: 50.85 KG/M2 | WEIGHT: 287 LBS | DIASTOLIC BLOOD PRESSURE: 80 MMHG | OXYGEN SATURATION: 97 % | HEART RATE: 82 BPM | SYSTOLIC BLOOD PRESSURE: 134 MMHG | HEIGHT: 63 IN

## 2025-04-11 DIAGNOSIS — F32.A ANXIETY AND DEPRESSION: ICD-10-CM

## 2025-04-11 DIAGNOSIS — E66.813 CLASS 3 SEVERE OBESITY DUE TO EXCESS CALORIES WITHOUT SERIOUS COMORBIDITY WITH BODY MASS INDEX (BMI) OF 45.0 TO 49.9 IN ADULT: ICD-10-CM

## 2025-04-11 DIAGNOSIS — I10 PRIMARY HYPERTENSION: Primary | ICD-10-CM

## 2025-04-11 DIAGNOSIS — R73.03 PREDIABETES: ICD-10-CM

## 2025-04-11 DIAGNOSIS — F41.9 ANXIETY AND DEPRESSION: ICD-10-CM

## 2025-04-11 DIAGNOSIS — R00.2 PALPITATION: ICD-10-CM

## 2025-04-11 LAB
ALBUMIN SERPL-MCNC: 4 G/DL (ref 3.5–5)
ALBUMIN/GLOB SERPL: 1.2 (ref 1–1.9)
ALP SERPL-CCNC: 46 U/L (ref 35–104)
ALT SERPL-CCNC: 24 U/L (ref 8–45)
ANION GAP SERPL CALC-SCNC: 11 MMOL/L (ref 7–16)
AST SERPL-CCNC: 19 U/L (ref 15–37)
BILIRUB SERPL-MCNC: 0.3 MG/DL (ref 0–1.2)
BUN SERPL-MCNC: 11 MG/DL (ref 6–23)
CALCIUM SERPL-MCNC: 9.8 MG/DL (ref 8.8–10.2)
CHLORIDE SERPL-SCNC: 104 MMOL/L (ref 98–107)
CO2 SERPL-SCNC: 23 MMOL/L (ref 20–29)
CREAT SERPL-MCNC: 0.57 MG/DL (ref 0.6–1.1)
EST. AVERAGE GLUCOSE BLD GHB EST-MCNC: 122 MG/DL
GLOBULIN SER CALC-MCNC: 3.5 G/DL (ref 2.3–3.5)
GLUCOSE SERPL-MCNC: 97 MG/DL (ref 70–99)
HBA1C MFR BLD: 5.9 % (ref 0–5.6)
POTASSIUM SERPL-SCNC: 4.3 MMOL/L (ref 3.5–5.1)
PROT SERPL-MCNC: 7.5 G/DL (ref 6.3–8.2)
SODIUM SERPL-SCNC: 138 MMOL/L (ref 136–145)

## 2025-04-11 PROCEDURE — 99214 OFFICE O/P EST MOD 30 MIN: CPT | Performed by: INTERNAL MEDICINE

## 2025-04-11 PROCEDURE — 3079F DIAST BP 80-89 MM HG: CPT | Performed by: INTERNAL MEDICINE

## 2025-04-11 PROCEDURE — 3075F SYST BP GE 130 - 139MM HG: CPT | Performed by: INTERNAL MEDICINE

## 2025-04-11 RX ORDER — ATENOLOL 25 MG/1
25 TABLET ORAL DAILY
Qty: 90 TABLET | Refills: 1 | Status: SHIPPED | OUTPATIENT
Start: 2025-04-11

## 2025-04-11 RX ORDER — METFORMIN HYDROCHLORIDE 500 MG/1
1000 TABLET, EXTENDED RELEASE ORAL
Qty: 180 TABLET | Refills: 1 | Status: SHIPPED | OUTPATIENT
Start: 2025-04-11

## 2025-04-11 SDOH — ECONOMIC STABILITY: INCOME INSECURITY: IN THE LAST 12 MONTHS, WAS THERE A TIME WHEN YOU WERE NOT ABLE TO PAY THE MORTGAGE OR RENT ON TIME?: NO

## 2025-04-11 SDOH — ECONOMIC STABILITY: FOOD INSECURITY: WITHIN THE PAST 12 MONTHS, YOU WORRIED THAT YOUR FOOD WOULD RUN OUT BEFORE YOU GOT MONEY TO BUY MORE.: NEVER TRUE

## 2025-04-11 SDOH — ECONOMIC STABILITY: FOOD INSECURITY: WITHIN THE PAST 12 MONTHS, THE FOOD YOU BOUGHT JUST DIDN'T LAST AND YOU DIDN'T HAVE MONEY TO GET MORE.: NEVER TRUE

## 2025-04-11 SDOH — ECONOMIC STABILITY: TRANSPORTATION INSECURITY
IN THE PAST 12 MONTHS, HAS THE LACK OF TRANSPORTATION KEPT YOU FROM MEDICAL APPOINTMENTS OR FROM GETTING MEDICATIONS?: NO

## 2025-04-11 ASSESSMENT — ENCOUNTER SYMPTOMS
ABDOMINAL PAIN: 0
CHEST TIGHTNESS: 0
COUGH: 0
ABDOMINAL DISTENTION: 0
CONSTIPATION: 0
SHORTNESS OF BREATH: 0

## 2025-04-11 NOTE — PROGRESS NOTES
12/16/2024 6.1 (H) 0 - 5.6 % Final     Comment:     Reference Range  Normal       <5.7%  Prediabetes  5.7-6.4%  Diabetes     >6.4%          Lab Results   Component Value Date    CHOL 187 01/27/2023     Lab Results   Component Value Date    TRIG 106 01/27/2023     Lab Results   Component Value Date    HDL 56 01/27/2023     No components found for: \"LDLCHOLESTEROL\", \"LDLCALC\"  Lab Results   Component Value Date    VLDL 21.2 01/27/2023     Lab Results   Component Value Date    CHOLHDLRATIO 3.3 01/27/2023     Lab Results   Component Value Date    TSH 0.108 (L) 10/05/2021    TSHELE 1.03 12/16/2024         Lab Results   Component Value Date     12/16/2024    K 4.0 12/16/2024     12/16/2024    CO2 25 12/16/2024    BUN 13 12/16/2024    CREATININE 0.62 12/16/2024    GLUCOSE 96 12/16/2024    CALCIUM 9.4 12/16/2024    BILITOT <0.2 12/16/2024    ALKPHOS 42 12/16/2024    AST 16 12/16/2024    ALT 18 12/16/2024    LABGLOM >90 12/16/2024    GFRAA >60 04/20/2022    AGRATIO 0.7 (L) 04/20/2022    GLOB 3.4 12/16/2024       Lab Results   Component Value Date    WBC 9.1 12/16/2024    HGB 12.8 12/16/2024    HCT 40.5 12/16/2024    MCV 83.5 12/16/2024     12/16/2024             This document was generated with the aid of voice recognition software.. Please be aware that there may be inadvertent transcription errors not identified and corrected by the author

## 2025-04-11 NOTE — PATIENT INSTRUCTIONS
Patient Education        atenolol  Pronunciation:  a TEN oh lol  Brand:  Tenormin  What is the most important information I should know about atenolol?  Use only as directed. Tell your doctor if you use other medicines or have other medical conditions or allergies.  What is atenolol?  Atenolol is used to treat angina (chest pain) and hypertension (high blood pressure). Atenolol is also used to lower the risk of death after a heart attack.  Atenolol may also be used for purposes not listed in this medication guide.  What should I discuss with my healthcare provider before taking atenolol?  You should not use atenolol if you are allergic to it, or if you have:  a serious heart condition such as \"AV block\" (second or third degree);  slow heartbeats;  heart failure; or  if your heart cannot pump blood properly.  Tell your doctor if you have ever had:  congestive heart failure;  coronary artery disease (hardened arteries);  asthma, bronchitis, emphysema;  diabetes;  overactive thyroid;  liver or kidney disease;  pheochromocytoma (tumor of the adrenal gland);  peripheral vascular disease such as Raynaud's syndrome; or  allergies (or if you are undergoing allergy treatments or skin-testing).  May harm an unborn baby. Tell your doctor if you are pregnant or if you become pregnant while using this medicine.  Atenolol can pass into breast milk and may harm a nursing baby. Tell your doctor if you are breastfeeding a baby.  Atenolol is not approved for use by anyone younger than 18 years old.  How should I take atenolol?  Follow all directions on your prescription label and read all medication guides or instruction sheets. Your doctor may occasionally change your dose. Use the medicine exactly as directed.  Your blood pressure will need to be checked often.  If you need surgery, tell the surgeon ahead of time that you are using atenolol. You may need to stop using the medicine for a short time.  Keep using the medication as

## 2025-04-14 ASSESSMENT — ANXIETY QUESTIONNAIRES
IF YOU CHECKED OFF ANY PROBLEMS ON THIS QUESTIONNAIRE, HOW DIFFICULT HAVE THESE PROBLEMS MADE IT FOR YOU TO DO YOUR WORK, TAKE CARE OF THINGS AT HOME, OR GET ALONG WITH OTHER PEOPLE: SOMEWHAT DIFFICULT
4. TROUBLE RELAXING: MORE THAN HALF THE DAYS
7. FEELING AFRAID AS IF SOMETHING AWFUL MIGHT HAPPEN: NOT AT ALL
2. NOT BEING ABLE TO STOP OR CONTROL WORRYING: MORE THAN HALF THE DAYS
7. FEELING AFRAID AS IF SOMETHING AWFUL MIGHT HAPPEN: NOT AT ALL
5. BEING SO RESTLESS THAT IT IS HARD TO SIT STILL: SEVERAL DAYS
6. BECOMING EASILY ANNOYED OR IRRITABLE: NEARLY EVERY DAY
1. FEELING NERVOUS, ANXIOUS, OR ON EDGE: SEVERAL DAYS
2. NOT BEING ABLE TO STOP OR CONTROL WORRYING: MORE THAN HALF THE DAYS
4. TROUBLE RELAXING: MORE THAN HALF THE DAYS
3. WORRYING TOO MUCH ABOUT DIFFERENT THINGS: SEVERAL DAYS
1. FEELING NERVOUS, ANXIOUS, OR ON EDGE: SEVERAL DAYS
5. BEING SO RESTLESS THAT IT IS HARD TO SIT STILL: SEVERAL DAYS
IF YOU CHECKED OFF ANY PROBLEMS ON THIS QUESTIONNAIRE, HOW DIFFICULT HAVE THESE PROBLEMS MADE IT FOR YOU TO DO YOUR WORK, TAKE CARE OF THINGS AT HOME, OR GET ALONG WITH OTHER PEOPLE: SOMEWHAT DIFFICULT
6. BECOMING EASILY ANNOYED OR IRRITABLE: NEARLY EVERY DAY
3. WORRYING TOO MUCH ABOUT DIFFERENT THINGS: SEVERAL DAYS
GAD7 TOTAL SCORE: 10

## 2025-04-14 ASSESSMENT — PATIENT HEALTH QUESTIONNAIRE - PHQ9
SUM OF ALL RESPONSES TO PHQ QUESTIONS 1-9: 9
9. THOUGHTS THAT YOU WOULD BE BETTER OFF DEAD, OR OF HURTING YOURSELF: NOT AT ALL
5. POOR APPETITE OR OVEREATING: SEVERAL DAYS
6. FEELING BAD ABOUT YOURSELF - OR THAT YOU ARE A FAILURE OR HAVE LET YOURSELF OR YOUR FAMILY DOWN: NOT AT ALL
6. FEELING BAD ABOUT YOURSELF - OR THAT YOU ARE A FAILURE OR HAVE LET YOURSELF OR YOUR FAMILY DOWN: NOT AT ALL
4. FEELING TIRED OR HAVING LITTLE ENERGY: NEARLY EVERY DAY
8. MOVING OR SPEAKING SO SLOWLY THAT OTHER PEOPLE COULD HAVE NOTICED. OR THE OPPOSITE - BEING SO FIDGETY OR RESTLESS THAT YOU HAVE BEEN MOVING AROUND A LOT MORE THAN USUAL: NOT AT ALL
9. THOUGHTS THAT YOU WOULD BE BETTER OFF DEAD, OR OF HURTING YOURSELF: NOT AT ALL
5. POOR APPETITE OR OVEREATING: SEVERAL DAYS
4. FEELING TIRED OR HAVING LITTLE ENERGY: NEARLY EVERY DAY
10. IF YOU CHECKED OFF ANY PROBLEMS, HOW DIFFICULT HAVE THESE PROBLEMS MADE IT FOR YOU TO DO YOUR WORK, TAKE CARE OF THINGS AT HOME, OR GET ALONG WITH OTHER PEOPLE: SOMEWHAT DIFFICULT
SUM OF ALL RESPONSES TO PHQ QUESTIONS 1-9: 9
10. IF YOU CHECKED OFF ANY PROBLEMS, HOW DIFFICULT HAVE THESE PROBLEMS MADE IT FOR YOU TO DO YOUR WORK, TAKE CARE OF THINGS AT HOME, OR GET ALONG WITH OTHER PEOPLE: SOMEWHAT DIFFICULT
2. FEELING DOWN, DEPRESSED OR HOPELESS: SEVERAL DAYS
8. MOVING OR SPEAKING SO SLOWLY THAT OTHER PEOPLE COULD HAVE NOTICED. OR THE OPPOSITE, BEING SO FIGETY OR RESTLESS THAT YOU HAVE BEEN MOVING AROUND A LOT MORE THAN USUAL: NOT AT ALL
7. TROUBLE CONCENTRATING ON THINGS, SUCH AS READING THE NEWSPAPER OR WATCHING TELEVISION: NOT AT ALL
7. TROUBLE CONCENTRATING ON THINGS, SUCH AS READING THE NEWSPAPER OR WATCHING TELEVISION: NOT AT ALL
SUM OF ALL RESPONSES TO PHQ QUESTIONS 1-9: 9
3. TROUBLE FALLING OR STAYING ASLEEP: NEARLY EVERY DAY
1. LITTLE INTEREST OR PLEASURE IN DOING THINGS: SEVERAL DAYS
1. LITTLE INTEREST OR PLEASURE IN DOING THINGS: SEVERAL DAYS
2. FEELING DOWN, DEPRESSED OR HOPELESS: SEVERAL DAYS
SUM OF ALL RESPONSES TO PHQ QUESTIONS 1-9: 9
3. TROUBLE FALLING OR STAYING ASLEEP: NEARLY EVERY DAY
SUM OF ALL RESPONSES TO PHQ QUESTIONS 1-9: 9

## 2025-04-15 ENCOUNTER — OFFICE VISIT (OUTPATIENT)
Dept: BEHAVIORAL/MENTAL HEALTH CLINIC | Facility: CLINIC | Age: 40
End: 2025-04-15
Payer: COMMERCIAL

## 2025-04-15 ENCOUNTER — RESULTS FOLLOW-UP (OUTPATIENT)
Dept: INTERNAL MEDICINE CLINIC | Facility: CLINIC | Age: 40
End: 2025-04-15

## 2025-04-15 VITALS — BODY MASS INDEX: 48.12 KG/M2 | HEIGHT: 63 IN | WEIGHT: 271.6 LBS

## 2025-04-15 DIAGNOSIS — F41.9 ANXIETY DISORDER, UNSPECIFIED TYPE: ICD-10-CM

## 2025-04-15 DIAGNOSIS — G47.00 INSOMNIA, UNSPECIFIED TYPE: ICD-10-CM

## 2025-04-15 DIAGNOSIS — F33.1 MAJOR DEPRESSIVE DISORDER, RECURRENT, MODERATE (HCC): ICD-10-CM

## 2025-04-15 PROCEDURE — 90833 PSYTX W PT W E/M 30 MIN: CPT | Performed by: PSYCHIATRY & NEUROLOGY

## 2025-04-15 PROCEDURE — 99214 OFFICE O/P EST MOD 30 MIN: CPT | Performed by: PSYCHIATRY & NEUROLOGY

## 2025-04-15 RX ORDER — ARIPIPRAZOLE 10 MG/1
10 TABLET ORAL DAILY
Qty: 30 TABLET | Refills: 1 | Status: SHIPPED | OUTPATIENT
Start: 2025-04-15

## 2025-04-15 RX ORDER — PAROXETINE 20 MG/1
20 TABLET, FILM COATED ORAL DAILY
Qty: 30 TABLET | Refills: 1 | Status: SHIPPED | OUTPATIENT
Start: 2025-04-15

## 2025-04-15 RX ORDER — TRAZODONE HYDROCHLORIDE 50 MG/1
25-50 TABLET ORAL NIGHTLY
Qty: 30 TABLET | Refills: 1 | Status: SHIPPED | OUTPATIENT
Start: 2025-04-15

## 2025-04-15 NOTE — PROGRESS NOTES
the days More than half the days   Not being able to stop or control worrying More than half the days More than half the days Several days   Worrying too much about different things Several days More than half the days More than half the days   Trouble relaxing More than half the days Nearly every day More than half the days   Being so restless that it is hard to sit still Several days Several days Several days   Becoming easily annoyed or irritable Nearly every day Nearly every day Nearly every day   Feeling afraid as if something awful might happen Not at all Several days Several days   HESHAM-7 Total Score 10  14  12    How difficult have these problems made it for you to do your work, take care of things at home, or get along with other people? Somewhat difficult Somewhat difficult Very difficult       Patient-reported     Return to Clinic: 2 months OR sooner if needed    I have reviewed the [place] database report for this patient as required for prescription of controlled substances and did not note any discrepancies.  Medication side effects were discussed and benefits v. risks were presented. Treatment plan was discussed and agreed to by patient.  Diagnoses considered in this plan are \"working diagnoses\" and subject to change with additional information obtained over the course of additional time spent with patient and/or additional collateral information.  Currently, considering risks and protective factors, this patient has a:  low risk of suicide  low risk of homicide  Crisis Plan:  Patient encouraged to call 911 and /or go to the closest Emergency Department in case of agitation, severe anxiety, psychosis, nate, suicidal or homicidal urges, worrisome side effects to medications or other emergencies. Patient verbalized understanding of this plan and agreed to it.  This note has been completed using [employer] Dictation Software and while attempts have been made to ensure accuracy, certain words and

## 2025-05-14 ENCOUNTER — TELEMEDICINE (OUTPATIENT)
Dept: BEHAVIORAL/MENTAL HEALTH CLINIC | Facility: CLINIC | Age: 40
End: 2025-05-14
Payer: COMMERCIAL

## 2025-05-14 DIAGNOSIS — G47.00 INSOMNIA, UNSPECIFIED TYPE: ICD-10-CM

## 2025-05-14 DIAGNOSIS — F41.9 ANXIETY DISORDER, UNSPECIFIED TYPE: ICD-10-CM

## 2025-05-14 DIAGNOSIS — F33.1 MAJOR DEPRESSIVE DISORDER, RECURRENT, MODERATE (HCC): Primary | ICD-10-CM

## 2025-05-14 PROCEDURE — 99214 OFFICE O/P EST MOD 30 MIN: CPT | Performed by: PSYCHIATRY & NEUROLOGY

## 2025-05-14 RX ORDER — PAROXETINE 20 MG/1
20 TABLET, FILM COATED ORAL NIGHTLY
Qty: 30 TABLET | Refills: 1 | Status: SHIPPED | OUTPATIENT
Start: 2025-05-14

## 2025-05-14 RX ORDER — TRAZODONE HYDROCHLORIDE 50 MG/1
50-100 TABLET ORAL NIGHTLY
Qty: 30 TABLET | Refills: 1 | Status: SHIPPED | OUTPATIENT
Start: 2025-05-14

## 2025-05-14 ASSESSMENT — ANXIETY QUESTIONNAIRES
3. WORRYING TOO MUCH ABOUT DIFFERENT THINGS: MORE THAN HALF THE DAYS
7. FEELING AFRAID AS IF SOMETHING AWFUL MIGHT HAPPEN: SEVERAL DAYS
2. NOT BEING ABLE TO STOP OR CONTROL WORRYING: MORE THAN HALF THE DAYS
4. TROUBLE RELAXING: NEARLY EVERY DAY
IF YOU CHECKED OFF ANY PROBLEMS ON THIS QUESTIONNAIRE, HOW DIFFICULT HAVE THESE PROBLEMS MADE IT FOR YOU TO DO YOUR WORK, TAKE CARE OF THINGS AT HOME, OR GET ALONG WITH OTHER PEOPLE: VERY DIFFICULT
7. FEELING AFRAID AS IF SOMETHING AWFUL MIGHT HAPPEN: SEVERAL DAYS
3. WORRYING TOO MUCH ABOUT DIFFERENT THINGS: MORE THAN HALF THE DAYS
1. FEELING NERVOUS, ANXIOUS, OR ON EDGE: MORE THAN HALF THE DAYS
5. BEING SO RESTLESS THAT IT IS HARD TO SIT STILL: NEARLY EVERY DAY
6. BECOMING EASILY ANNOYED OR IRRITABLE: MORE THAN HALF THE DAYS
IF YOU CHECKED OFF ANY PROBLEMS ON THIS QUESTIONNAIRE, HOW DIFFICULT HAVE THESE PROBLEMS MADE IT FOR YOU TO DO YOUR WORK, TAKE CARE OF THINGS AT HOME, OR GET ALONG WITH OTHER PEOPLE: VERY DIFFICULT
GAD7 TOTAL SCORE: 15
4. TROUBLE RELAXING: NEARLY EVERY DAY
2. NOT BEING ABLE TO STOP OR CONTROL WORRYING: MORE THAN HALF THE DAYS
6. BECOMING EASILY ANNOYED OR IRRITABLE: MORE THAN HALF THE DAYS
5. BEING SO RESTLESS THAT IT IS HARD TO SIT STILL: NEARLY EVERY DAY
1. FEELING NERVOUS, ANXIOUS, OR ON EDGE: MORE THAN HALF THE DAYS

## 2025-05-14 ASSESSMENT — PATIENT HEALTH QUESTIONNAIRE - PHQ9
9. THOUGHTS THAT YOU WOULD BE BETTER OFF DEAD, OR OF HURTING YOURSELF: NOT AT ALL
6. FEELING BAD ABOUT YOURSELF - OR THAT YOU ARE A FAILURE OR HAVE LET YOURSELF OR YOUR FAMILY DOWN: MORE THAN HALF THE DAYS
SUM OF ALL RESPONSES TO PHQ QUESTIONS 1-9: 20
1. LITTLE INTEREST OR PLEASURE IN DOING THINGS: NEARLY EVERY DAY
2. FEELING DOWN, DEPRESSED OR HOPELESS: MORE THAN HALF THE DAYS
2. FEELING DOWN, DEPRESSED OR HOPELESS: MORE THAN HALF THE DAYS
9. THOUGHTS THAT YOU WOULD BE BETTER OFF DEAD, OR OF HURTING YOURSELF: NOT AT ALL
SUM OF ALL RESPONSES TO PHQ QUESTIONS 1-9: 20
3. TROUBLE FALLING OR STAYING ASLEEP: NEARLY EVERY DAY
7. TROUBLE CONCENTRATING ON THINGS, SUCH AS READING THE NEWSPAPER OR WATCHING TELEVISION: MORE THAN HALF THE DAYS
SUM OF ALL RESPONSES TO PHQ QUESTIONS 1-9: 20
8. MOVING OR SPEAKING SO SLOWLY THAT OTHER PEOPLE COULD HAVE NOTICED. OR THE OPPOSITE - BEING SO FIDGETY OR RESTLESS THAT YOU HAVE BEEN MOVING AROUND A LOT MORE THAN USUAL: NEARLY EVERY DAY
3. TROUBLE FALLING OR STAYING ASLEEP: NEARLY EVERY DAY
8. MOVING OR SPEAKING SO SLOWLY THAT OTHER PEOPLE COULD HAVE NOTICED. OR THE OPPOSITE, BEING SO FIGETY OR RESTLESS THAT YOU HAVE BEEN MOVING AROUND A LOT MORE THAN USUAL: NEARLY EVERY DAY
5. POOR APPETITE OR OVEREATING: NEARLY EVERY DAY
10. IF YOU CHECKED OFF ANY PROBLEMS, HOW DIFFICULT HAVE THESE PROBLEMS MADE IT FOR YOU TO DO YOUR WORK, TAKE CARE OF THINGS AT HOME, OR GET ALONG WITH OTHER PEOPLE: VERY DIFFICULT
SUM OF ALL RESPONSES TO PHQ QUESTIONS 1-9: 20
6. FEELING BAD ABOUT YOURSELF - OR THAT YOU ARE A FAILURE OR HAVE LET YOURSELF OR YOUR FAMILY DOWN: MORE THAN HALF THE DAYS
4. FEELING TIRED OR HAVING LITTLE ENERGY: MORE THAN HALF THE DAYS
5. POOR APPETITE OR OVEREATING: NEARLY EVERY DAY
10. IF YOU CHECKED OFF ANY PROBLEMS, HOW DIFFICULT HAVE THESE PROBLEMS MADE IT FOR YOU TO DO YOUR WORK, TAKE CARE OF THINGS AT HOME, OR GET ALONG WITH OTHER PEOPLE: VERY DIFFICULT
SUM OF ALL RESPONSES TO PHQ QUESTIONS 1-9: 20
7. TROUBLE CONCENTRATING ON THINGS, SUCH AS READING THE NEWSPAPER OR WATCHING TELEVISION: MORE THAN HALF THE DAYS
4. FEELING TIRED OR HAVING LITTLE ENERGY: MORE THAN HALF THE DAYS
1. LITTLE INTEREST OR PLEASURE IN DOING THINGS: NEARLY EVERY DAY

## 2025-05-14 ASSESSMENT — COLUMBIA-SUICIDE SEVERITY RATING SCALE - C-SSRS
1. IN THE PAST MONTH, HAVE YOU WISHED YOU WERE DEAD OR WISHED YOU COULD GO TO SLEEP AND NOT WAKE UP?: NO
6. IN YOUR LIFETIME, HAVE YOU EVER DONE ANYTHING, STARTED TO DO ANYTHING, OR PREPARED TO DO ANYTHING TO END YOUR LIFE?: NO
2. IN THE PAST MONTH, HAVE YOU ACTUALLY HAD ANY THOUGHTS OF KILLING YOURSELF?: NO

## 2025-05-14 NOTE — PROGRESS NOTES
about different things More than half the days Several days More than half the days   Trouble relaxing Nearly every day More than half the days Nearly every day   Being so restless that it is hard to sit still Nearly every day Several days Several days   Becoming easily annoyed or irritable More than half the days Nearly every day Nearly every day   Feeling afraid as if something awful might happen Several days Not at all Several days   HESHAM-7 Total Score 15  10  14    How difficult have these problems made it for you to do your work, take care of things at home, or get along with other people? Very difficult Somewhat difficult Somewhat difficult       Patient-reported     Return to Clinic: next appt OR sooner if needed    I have reviewed the SCRIPTS database report for this patient as required for prescription of controlled substances and did not note any discrepancies.  Medication side effects were discussed and benefits v. risks were presented. Treatment plan was discussed and agreed to by patient.  Diagnoses considered in this plan are \"working diagnoses\" and subject to change with additional information obtained over the course of additional time spent with patient and/or additional collateral information.  Currently, considering risks and protective factors, this patient has a:  low risk of suicide  low risk of homicide  Crisis Plan:  Patient encouraged to call 911 and /or go to the closest Emergency Department in case of agitation, severe anxiety, psychosis, nate, suicidal or homicidal urges, worrisome side effects to medications or other emergencies. Patient verbalized understanding of this plan and agreed to it.  This note has been completed using [facility] [employer] Dictation Software and while attempts have been made to ensure accuracy, certain words and phrases may not be transcribed as intended.

## 2025-06-24 ENCOUNTER — TELEMEDICINE (OUTPATIENT)
Dept: BEHAVIORAL/MENTAL HEALTH CLINIC | Facility: CLINIC | Age: 40
End: 2025-06-24
Payer: COMMERCIAL

## 2025-06-24 DIAGNOSIS — F33.1 MAJOR DEPRESSIVE DISORDER, RECURRENT, MODERATE (HCC): Primary | ICD-10-CM

## 2025-06-24 DIAGNOSIS — G47.00 INSOMNIA, UNSPECIFIED TYPE: ICD-10-CM

## 2025-06-24 DIAGNOSIS — F41.9 ANXIETY DISORDER, UNSPECIFIED TYPE: ICD-10-CM

## 2025-06-24 PROCEDURE — 90833 PSYTX W PT W E/M 30 MIN: CPT | Performed by: PSYCHIATRY & NEUROLOGY

## 2025-06-24 PROCEDURE — 99214 OFFICE O/P EST MOD 30 MIN: CPT | Performed by: PSYCHIATRY & NEUROLOGY

## 2025-06-24 RX ORDER — PAROXETINE 20 MG/1
20 TABLET, FILM COATED ORAL NIGHTLY
Qty: 30 TABLET | Refills: 1 | Status: SHIPPED | OUTPATIENT
Start: 2025-06-24

## 2025-06-24 RX ORDER — LAMOTRIGINE 100 MG/1
TABLET ORAL
Qty: 30 TABLET | Refills: 1 | Status: SHIPPED | OUTPATIENT
Start: 2025-06-24

## 2025-06-24 RX ORDER — TRAZODONE HYDROCHLORIDE 150 MG/1
150 TABLET ORAL NIGHTLY
Qty: 30 TABLET | Refills: 1 | Status: SHIPPED | OUTPATIENT
Start: 2025-06-24

## 2025-06-24 NOTE — PROGRESS NOTES
PROGRESS NOTE  Patient: Marilu Stanton         Date: 2025   MR#: [id number]              : 1985  IDENTIFYING INFORMATION: This is a 40 y.o. old female who is a patient whom presents to clinic for follow up evaluation.   Marilu Stanton was evaluated through a synchronous (real-time) audio-video encounter. The patient (or guardian if applicable) is aware that this is a billable service, which includes applicable co-pays. This Virtual Visit was conducted with patient's (and/or legal guardian's) consent. Patient identification was verified, and a caregiver was present when appropriate.   The patient was located at Home: [address] [place] [address] [address] [place] [id number]  Provider was located at Facility (Appt Dept): [facility] [place] [place] [address]  Confirm you are appropriately licensed, registered, or certified to deliver care in the state where the patient is located as indicated above. If you are not or unsure, please re-schedule the visit: Yes, I confirm.     CHIEF COMPLAINT: depression, anxiety  HISTORY OF PRESENT ILLNESS:  Interval History:  Ongoing mood and anxiety issues. Endorses having similar \"going feeling\" with Vraylar but not as bad as Abilify. Stopped taking it. Discussed using Lamictal in its place and titrate for effect. Reports mood remains up and down. Sleep has been poor to fair. Will increase Trazodone. Not able to turn off her brain. Discussed deep breathing exercises at night to reduce anxiety. Working on putting electronics especially the phone. Discussed working on coping skills and relaxation techniques (mediation, mindfulness, breathing exercises) to help mitigate anxiety and improve sleep. Reading, listening to music, outdoor activities, forms of exercise, puzzles, art (drawing, coloring, painting) all are good ways to alleviate angst.  Being active outside during the warmer weather helps improve mood, anxiety, and sleep. Walking, light yard work, gardening, and

## 2025-07-08 ENCOUNTER — OFFICE VISIT (OUTPATIENT)
Dept: OBGYN CLINIC | Age: 40
End: 2025-07-08
Payer: COMMERCIAL

## 2025-07-08 VITALS
DIASTOLIC BLOOD PRESSURE: 78 MMHG | BODY MASS INDEX: 46.49 KG/M2 | HEIGHT: 63 IN | SYSTOLIC BLOOD PRESSURE: 122 MMHG | WEIGHT: 262.4 LBS

## 2025-07-08 DIAGNOSIS — Z01.419 WELL WOMAN EXAM WITH ROUTINE GYNECOLOGICAL EXAM: Primary | ICD-10-CM

## 2025-07-08 PROCEDURE — 99395 PREV VISIT EST AGE 18-39: CPT | Performed by: OBSTETRICS & GYNECOLOGY

## 2025-07-15 NOTE — PROGRESS NOTES
7/8/25:    Patient presents today for   Chief Complaint   Patient presents with    Annual Exam       LMP: Patient's last menstrual period was 06/29/2025.  Contraception: nexplanon        Allergies   Allergen Reactions    Latex Itching and Rash    Codeine Nausea And Vomiting and Other (See Comments)    Sulfa Antibiotics Nausea And Vomiting and Other (See Comments)    Naproxen Other (See Comments)     Can not take due to history peptic ulcers    Naproxen Sodium      Current Outpatient Medications   Medication Sig Dispense Refill    traZODone (DESYREL) 150 MG tablet Take 1 tablet by mouth nightly 30 tablet 1    PARoxetine (PAXIL) 20 MG tablet Take 1 tablet by mouth nightly 30 tablet 1    lamoTRIgine (LAMICTAL) 100 MG tablet Take 0.5 tablet daily x 2 week, then 1 tablet daily 30 tablet 1    atenolol (TENORMIN) 25 MG tablet Take 1 tablet by mouth daily 90 tablet 1    spironolactone (ALDACTONE) 50 MG tablet Take 1 tablet by mouth daily 90 tablet 1    valsartan (DIOVAN) 80 MG tablet Take 1 tablet by mouth daily 90 tablet 1    albuterol sulfate HFA (PROVENTIL;VENTOLIN;PROAIR) 108 (90 Base) MCG/ACT inhaler Inhale 2 puffs into the lungs every 4 hours as needed for Wheezing 18 g 1    NEXPLANON 68 MG implant       famotidine (PEPCID) 20 MG tablet Take 1 tablet by mouth 2 times daily      meloxicam (MOBIC) 15 MG tablet TAKE 1 TABLET BY MOUTH EVERY DAY AS NEEDED FOR PAIN 30 tablet 1    ibuprofen (ADVIL;MOTRIN) 600 MG tablet Take 1 tablet by mouth every 6 hours as needed      metFORMIN (GLUCOPHAGE-XR) 500 MG extended release tablet Take 2 tablets by mouth daily (with breakfast) (Patient not taking: Reported on 7/8/2025) 180 tablet 1     No current facility-administered medications for this visit.     Past Medical History:   Diagnosis Date    Asthma     prn inhaler    Cholelithiasis with cholecystitis 11/24/2015    GERD (gastroesophageal reflux disease)     controlled with medication    Gestational diabetes 2017    Gestational

## 2025-07-16 DIAGNOSIS — I10 PRIMARY HYPERTENSION: ICD-10-CM

## 2025-07-16 RX ORDER — VALSARTAN 80 MG/1
80 TABLET ORAL DAILY
Qty: 90 TABLET | Refills: 1 | Status: SHIPPED | OUTPATIENT
Start: 2025-07-16

## 2025-07-16 NOTE — TELEPHONE ENCOUNTER
Requested Prescriptions     Pending Prescriptions Disp Refills    valsartan (DIOVAN) 80 MG tablet [Pharmacy Med Name: VALSARTAN 80 MG TABLET] 90 tablet 1     Sig: TAKE 1 TABLET BY MOUTH EVERY DAY    Next ov 08/25/2025. Pharmacy confirmed.

## 2025-07-22 ENCOUNTER — TELEMEDICINE (OUTPATIENT)
Dept: BEHAVIORAL/MENTAL HEALTH CLINIC | Facility: CLINIC | Age: 40
End: 2025-07-22
Payer: COMMERCIAL

## 2025-07-22 DIAGNOSIS — F33.1 MAJOR DEPRESSIVE DISORDER, RECURRENT, MODERATE (HCC): Primary | ICD-10-CM

## 2025-07-22 DIAGNOSIS — F41.9 ANXIETY DISORDER, UNSPECIFIED TYPE: ICD-10-CM

## 2025-07-22 DIAGNOSIS — G47.00 INSOMNIA, UNSPECIFIED TYPE: ICD-10-CM

## 2025-07-22 PROCEDURE — 90833 PSYTX W PT W E/M 30 MIN: CPT | Performed by: PSYCHIATRY & NEUROLOGY

## 2025-07-22 PROCEDURE — 99214 OFFICE O/P EST MOD 30 MIN: CPT | Performed by: PSYCHIATRY & NEUROLOGY

## 2025-07-22 RX ORDER — TRAZODONE HYDROCHLORIDE 150 MG/1
150 TABLET ORAL NIGHTLY
Qty: 30 TABLET | Refills: 2 | Status: SHIPPED | OUTPATIENT
Start: 2025-07-22

## 2025-07-22 RX ORDER — PAROXETINE 20 MG/1
20 TABLET, FILM COATED ORAL NIGHTLY
Qty: 30 TABLET | Refills: 2 | Status: SHIPPED | OUTPATIENT
Start: 2025-07-22

## 2025-07-22 RX ORDER — LAMOTRIGINE 150 MG/1
150 TABLET ORAL DAILY
Qty: 30 TABLET | Refills: 2 | Status: SHIPPED | OUTPATIENT
Start: 2025-07-22

## 2025-07-22 NOTE — PROGRESS NOTES
PROGRESS NOTE  Patient: Marilu Stanton         Date: 2025   MR#: [id number]              : 1985  IDENTIFYING INFORMATION: This is a 40 y.o. old female who is a patient whom presents to clinic for follow up evaluation.   Marilu Stanton was evaluated through a synchronous (real-time) audio-video encounter. The patient (or guardian if applicable) is aware that this is a billable service, which includes applicable co-pays. This Virtual Visit was conducted with patient's (and/or legal guardian's) consent. Patient identification was verified, and a caregiver was present when appropriate.   The patient was located at Home: [address] [place] [address] [address] [place] [id number]  Provider was located at Facility (Appt Dept): [facility] [place] [address]  Confirm you are appropriately licensed, registered, or certified to deliver care in the state where the patient is located as indicated above. If you are not or unsure, please re-schedule the visit: Yes, I confirm.     CHIEF COMPLAINT: depression, anxiety  HISTORY OF PRESENT ILLNESS:  Interval History:  Ongoing mood and anxiety issues. States sleep has improved with increase in Trazodone. Feels Lamictal has helped with mood. Will increase to 150 mg and monitor. Endorses she has been more active outside and working on a sleep routine. Discussed working on coping skills and relaxation techniques (mediation, mindfulness, breathing exercises) to help mitigate anxiety and improve sleep. Reading, listening to music, outdoor activities, forms of exercise, puzzles, art (drawing, coloring, painting) all are good ways to alleviate angst.  Good sleep hygiene measures were encouraged. [employer] lending utilized. Supportive measures furnished.   Current Symptoms  Duration: chronic  Sleep: improving  Appetite: fair  Anxiety: endorses, worries, improving  Mood: stressed, improved  Compliance with medications: endorses  Side effects from the medications: Wellbutrin -

## 2025-08-22 ASSESSMENT — SLEEP AND FATIGUE QUESTIONNAIRES
HOW LIKELY ARE YOU TO NOD OFF OR FALL ASLEEP WHILE SITTING INACTIVE IN A PUBLIC PLACE: WOULD NEVER DOZE
HOW LIKELY ARE YOU TO NOD OFF OR FALL ASLEEP WHILE LYING DOWN TO REST IN THE AFTERNOON WHEN CIRCUMSTANCES PERMIT: SLIGHT CHANCE OF DOZING
HOW LIKELY ARE YOU TO NOD OFF OR FALL ASLEEP WHEN YOU ARE A PASSENGER IN A CAR FOR AN HOUR WITHOUT A BREAK: WOULD NEVER DOZE
HOW LIKELY ARE YOU TO NOD OFF OR FALL ASLEEP WHILE LYING DOWN TO REST IN THE AFTERNOON WHEN CIRCUMSTANCES PERMIT: SLIGHT CHANCE OF DOZING
HOW LIKELY ARE YOU TO NOD OFF OR FALL ASLEEP WHILE SITTING QUIETLY AFTER LUNCH WITHOUT ALCOHOL: WOULD NEVER DOZE
HOW LIKELY ARE YOU TO NOD OFF OR FALL ASLEEP IN A CAR, WHILE STOPPED FOR A FEW MINUTES IN TRAFFIC: WOULD NEVER DOZE
HOW LIKELY ARE YOU TO NOD OFF OR FALL ASLEEP WHILE SITTING AND TALKING TO SOMEONE: WOULD NEVER DOZE
HOW LIKELY ARE YOU TO NOD OFF OR FALL ASLEEP WHILE SITTING AND READING: SLIGHT CHANCE OF DOZING
ESS TOTAL SCORE: 3
HOW LIKELY ARE YOU TO NOD OFF OR FALL ASLEEP WHILE SITTING QUIETLY AFTER LUNCH WITHOUT ALCOHOL: WOULD NEVER DOZE
HOW LIKELY ARE YOU TO NOD OFF OR FALL ASLEEP WHILE SITTING AND TALKING TO SOMEONE: WOULD NEVER DOZE
HOW LIKELY ARE YOU TO NOD OFF OR FALL ASLEEP WHEN YOU ARE A PASSENGER IN A CAR FOR AN HOUR WITHOUT A BREAK: WOULD NEVER DOZE
HOW LIKELY ARE YOU TO NOD OFF OR FALL ASLEEP WHILE WATCHING TV: SLIGHT CHANCE OF DOZING
HOW LIKELY ARE YOU TO NOD OFF OR FALL ASLEEP WHILE WATCHING TV: SLIGHT CHANCE OF DOZING
HOW LIKELY ARE YOU TO NOD OFF OR FALL ASLEEP IN A CAR, WHILE STOPPED FOR A FEW MINUTES IN TRAFFIC: WOULD NEVER DOZE
HOW LIKELY ARE YOU TO NOD OFF OR FALL ASLEEP WHILE SITTING INACTIVE IN A PUBLIC PLACE: WOULD NEVER DOZE
HOW LIKELY ARE YOU TO NOD OFF OR FALL ASLEEP WHILE SITTING AND READING: SLIGHT CHANCE OF DOZING

## 2025-08-25 ENCOUNTER — OFFICE VISIT (OUTPATIENT)
Dept: INTERNAL MEDICINE CLINIC | Facility: CLINIC | Age: 40
End: 2025-08-25
Payer: COMMERCIAL

## 2025-08-25 VITALS
HEIGHT: 63 IN | OXYGEN SATURATION: 96 % | HEART RATE: 72 BPM | SYSTOLIC BLOOD PRESSURE: 120 MMHG | BODY MASS INDEX: 48.02 KG/M2 | DIASTOLIC BLOOD PRESSURE: 78 MMHG | WEIGHT: 271 LBS

## 2025-08-25 DIAGNOSIS — R09.81 SINUS CONGESTION: Primary | ICD-10-CM

## 2025-08-25 DIAGNOSIS — Z87.09 HISTORY OF ASTHMA: ICD-10-CM

## 2025-08-25 DIAGNOSIS — R73.03 PRE-DIABETES: ICD-10-CM

## 2025-08-25 DIAGNOSIS — I10 PRIMARY HYPERTENSION: ICD-10-CM

## 2025-08-25 PROBLEM — I15.8 OTHER SECONDARY HYPERTENSION: Status: RESOLVED | Noted: 2023-01-17 | Resolved: 2025-08-25

## 2025-08-25 PROCEDURE — 3074F SYST BP LT 130 MM HG: CPT | Performed by: INTERNAL MEDICINE

## 2025-08-25 PROCEDURE — 3078F DIAST BP <80 MM HG: CPT | Performed by: INTERNAL MEDICINE

## 2025-08-25 PROCEDURE — 99214 OFFICE O/P EST MOD 30 MIN: CPT | Performed by: INTERNAL MEDICINE

## 2025-08-25 RX ORDER — SPIRONOLACTONE 50 MG/1
50 TABLET, FILM COATED ORAL DAILY
Qty: 90 TABLET | Refills: 1 | Status: SHIPPED | OUTPATIENT
Start: 2025-08-25

## 2025-08-25 RX ORDER — PREDNISONE 20 MG/1
20 TABLET ORAL DAILY
Qty: 7 TABLET | Refills: 0 | Status: SHIPPED | OUTPATIENT
Start: 2025-08-25 | End: 2025-09-01

## 2025-08-25 ASSESSMENT — ENCOUNTER SYMPTOMS
SORE THROAT: 1
ABDOMINAL PAIN: 0
TROUBLE SWALLOWING: 1
COUGH: 0
SINUS PRESSURE: 1
CONSTIPATION: 0
SHORTNESS OF BREATH: 0
CHEST TIGHTNESS: 1
ABDOMINAL DISTENTION: 0
VOICE CHANGE: 1
SINUS PAIN: 1

## 2025-08-29 ENCOUNTER — OFFICE VISIT (OUTPATIENT)
Dept: SLEEP MEDICINE | Age: 40
End: 2025-08-29
Payer: COMMERCIAL

## 2025-08-29 VITALS
WEIGHT: 271 LBS | SYSTOLIC BLOOD PRESSURE: 130 MMHG | OXYGEN SATURATION: 94 % | HEART RATE: 63 BPM | BODY MASS INDEX: 48.02 KG/M2 | DIASTOLIC BLOOD PRESSURE: 80 MMHG | HEIGHT: 63 IN

## 2025-08-29 DIAGNOSIS — E66.01 MORBID (SEVERE) OBESITY DUE TO EXCESS CALORIES (HCC): ICD-10-CM

## 2025-08-29 DIAGNOSIS — G47.34 NOCTURNAL HYPOXEMIA: ICD-10-CM

## 2025-08-29 DIAGNOSIS — G47.33 OSA (OBSTRUCTIVE SLEEP APNEA): Primary | ICD-10-CM

## 2025-08-29 PROCEDURE — 3075F SYST BP GE 130 - 139MM HG: CPT | Performed by: PHYSICIAN ASSISTANT

## 2025-08-29 PROCEDURE — 99213 OFFICE O/P EST LOW 20 MIN: CPT | Performed by: PHYSICIAN ASSISTANT

## 2025-08-29 PROCEDURE — 3079F DIAST BP 80-89 MM HG: CPT | Performed by: PHYSICIAN ASSISTANT

## 2025-08-29 ASSESSMENT — SLEEP AND FATIGUE QUESTIONNAIRES
HOW LIKELY ARE YOU TO NOD OFF OR FALL ASLEEP WHILE SITTING AND READING: SLIGHT CHANCE OF DOZING
HOW LIKELY ARE YOU TO NOD OFF OR FALL ASLEEP WHILE SITTING QUIETLY AFTER LUNCH WITHOUT ALCOHOL: WOULD NEVER DOZE
HOW LIKELY ARE YOU TO NOD OFF OR FALL ASLEEP WHILE WATCHING TV: SLIGHT CHANCE OF DOZING
HOW LIKELY ARE YOU TO NOD OFF OR FALL ASLEEP WHILE SITTING AND TALKING TO SOMEONE: WOULD NEVER DOZE
ESS TOTAL SCORE: 3
HOW LIKELY ARE YOU TO NOD OFF OR FALL ASLEEP WHILE SITTING INACTIVE IN A PUBLIC PLACE: WOULD NEVER DOZE
HOW LIKELY ARE YOU TO NOD OFF OR FALL ASLEEP IN A CAR, WHILE STOPPED FOR A FEW MINUTES IN TRAFFIC: WOULD NEVER DOZE
HOW LIKELY ARE YOU TO NOD OFF OR FALL ASLEEP WHEN YOU ARE A PASSENGER IN A CAR FOR AN HOUR WITHOUT A BREAK: WOULD NEVER DOZE
HOW LIKELY ARE YOU TO NOD OFF OR FALL ASLEEP WHILE LYING DOWN TO REST IN THE AFTERNOON WHEN CIRCUMSTANCES PERMIT: SLIGHT CHANCE OF DOZING